# Patient Record
Sex: FEMALE | Race: BLACK OR AFRICAN AMERICAN | NOT HISPANIC OR LATINO | Employment: OTHER | URBAN - METROPOLITAN AREA
[De-identification: names, ages, dates, MRNs, and addresses within clinical notes are randomized per-mention and may not be internally consistent; named-entity substitution may affect disease eponyms.]

---

## 2017-05-11 ENCOUNTER — OFFICE VISIT (OUTPATIENT)
Dept: PEDIATRICS | Facility: CLINIC | Age: 67
End: 2017-05-11
Payer: COMMERCIAL

## 2017-05-11 VITALS
SYSTOLIC BLOOD PRESSURE: 136 MMHG | OXYGEN SATURATION: 98 % | DIASTOLIC BLOOD PRESSURE: 80 MMHG | TEMPERATURE: 98 F | WEIGHT: 165 LBS | HEART RATE: 82 BPM | HEIGHT: 61 IN | BODY MASS INDEX: 31.15 KG/M2

## 2017-05-11 DIAGNOSIS — Z12.31 ENCOUNTER FOR SCREENING MAMMOGRAM FOR BREAST CANCER: ICD-10-CM

## 2017-05-11 DIAGNOSIS — Z12.11 ENCOUNTER FOR SCREENING FOR MALIGNANT NEOPLASM OF COLON: ICD-10-CM

## 2017-05-11 DIAGNOSIS — R10.13 EPIGASTRIC PAIN: Primary | ICD-10-CM

## 2017-05-11 PROCEDURE — 99204 OFFICE O/P NEW MOD 45 MIN: CPT | Mod: GC | Performed by: INTERNAL MEDICINE

## 2017-05-11 NOTE — PATIENT INSTRUCTIONS
1. I ordered a stool test today to check for H.Pylori, a bacteria than can contribute to stomach pain, ulcers, and inflammation.  Please bring this test back in as soon as able.  If possible, she will need additional medications (multiple antibiotics) in addition to the antacid     2. We will try omeprazole one tablet per day, 30 minutes before a meal, to help with stomach pain.      3.  We will try to obtain her old records to see what else she needs     4. I ordered a mammogram (to screen to breast cancer) and colonoscopy (to screen for colon cancer).  These should be done in the next couple months if able.  They will call you to set these up.     5. Follow up in 4 weeks to see how the medication is working and to make sure we update any immunizations, lab tests, etc, after obtaining the old records.     6. She should see a dentist (any one is ok)    7. She should get a routine eye check at an optometrist (we have one downstairs, but any one is ok)

## 2017-05-11 NOTE — PROGRESS NOTES
"  SUBJECTIVE:                                                    Marck Hernandez is a 67 year old female who presents to clinic today for the following health issues:      New Patient/Transfer of Care    CC: Epigastric abdominal pain, establish care    Epigastric abdominal pain - Has h/o GERD in [place] which has been chronic off and on but has been worse since arriving in [place] 2 [place] ago.  Complains of epigastric abdominal pain, feels like her prior GERD but worse.  Worse with eating and specifically worse with spicy food. ALso does drink Georgian coffee.  Much worse at night, when lying down.  No nausea or vomiting. Intermittent constipation, which is chronic but also worse since arriving in the [place].  No diarrhea. No blood in stools.      Establish care - Working on obtaining records from her one prior doctors visit at [facility] which was 1-2 years ago, after she arrived in .      - Past medical history updated in chart, see below  --- HTN - previously on a single medication in [facility] but has been more normal in recent years, no meds currently, has always been normal whenever checked in [place] for past 1-2 years  --- GERD - chronic, see above  --- UTI - no recurrence, one time dx upon arrival in , tx'd with abx     - Had positive \"stool test\" of some sort when seen at [facility]; treated with one drug; son thinks it was positive for ova and parasite    - Family history updated in chart, see below    - No medications currently     - Had a pap smear 1-2 years ago at [facility]; son says he believes it was normal, patient cannot remember, but never had any specific treatment. No known prior abnormal pap smears.   - Has never had a mammogram, colonoscopy, dexa scan  - Unsure of vaccination history. Received some shots prior to arrival in , not sure which ones. Son will help with vaccination records       Problem list and histories reviewed & adjusted, as indicated.  Additional history: as " "documented    There is no problem list on file for this patient.    Past Medical History:   Diagnosis Date     GERD (gastroesophageal reflux disease)      HTN (hypertension)     Previously treated in [place], not on medication since approx 2014    Past Surgical History:   Procedure Laterality Date     NO HISTORY OF SURGERY         Social History   Substance Use Topics     Smoking status: Never Smoker     Smokeless tobacco: Never Used     Alcohol use No     Family History   Problem Relation Age of Onset     DIABETES No family hx of      HEART DISEASE No family hx of      Thyroid Disease No family hx of            Reviewed and updated as needed this visit by clinical staff  Tobacco  Allergies  Meds  Soc Hx      Reviewed and updated as needed this visit by Provider         ROS:  10 point ROS obtained.  Pertinents discussed in HPI, otherwise negative.     OBJECTIVE:                                                    /80 (Cuff Size: Adult Regular)  Pulse 82  Temp 98  F (36.7  C) (Oral)  Ht 5' 1\" (1.549 m)  Wt 165 lb (74.8 kg)  SpO2 98%  BMI 31.18 kg/m2  Body mass index is 31.18 kg/(m^2).  GENERAL: healthy, alert and no distress  EYES: Blue ring around iris bilaterally (arcus senilis?), otherwise normal. PERRL.   HENT: ear canals and TM's normal, nose and mouth without ulcers or lesions. Poor dentition, multiple gold fillings or caps.   NECK: no adenopathy, no asymmetry, masses, or scars and thyroid normal to palpation  RESP: lungs clear to auscultation - no rales, rhonchi or wheezes  CV: regular rate and rhythm, normal S1 S2, no S3 or S4, no murmur, click or rub, no peripheral edema and peripheral pulses strong  ABDOMEN: soft, obese, nontender, no hepatosplenomegaly, no masses and bowel sounds normal  MS: no gross musculoskeletal defects noted, no edema  SKIN: 3-4 cm circular area of depigmented skin over dorsi aspect of right foot (vitiligo?), no other suspicious lesions or moles   NEURO: Normal strength " and tone, mentation intact and speech normal    Diagnostic Test Results:  none      ASSESSMENT/PLAN:                                                        (R10.13) Epigastric pain  (primary encounter diagnosis)  Comment: Likely GERD vs gastritis.  Unlikely PUD but is possible.  Higher risk for h.pylori. Unlikely pancreatitis but could rule out should pain persist despite PPI.  Will test for h pylori and treat empirically with PPI; if not improving may need EGD, but not indicated at this time.   Plan: H Pylori antigen, stool, omeprazole (PRILOSEC)         20 MG CR capsule    (Z12.11) Encounter for screening for malignant neoplasm of colon  Comment: No prior colonoscopies.  No known famiily history of colon cancer or other high risk factors.    Plan: GASTROENTEROLOGY ADULT REF PROCEDURE ONLY    (Z12.31) Encounter for screening mammogram for breast cancer  Comment: No prior mammograms.  No family history as far as her or her son know.  Plan: *MA Screening Digital Bilateral    Pre-hypertension  Comment: Patient with pre hypertensive blood pressure readings and h/o prior HTN, not currenlty on medication.  Discussed monitoring this closely, discussed lifestyle changes to prevent further progression.     Patient here to establish care today and to begin the process of getting caught up on routine health maintenance.  Will obtain records from [facility] (and son can bring in with next visit as well) to see what lab work she may need done.  Reportedly she is up to date on pap smears, it was normal, and it would be reasonable to discuss discontinuation of these, however would want confirmation of a negative pap prior to this discussion.  Likely needs lab workup  including A1c and lipid panel at next visit.  Has arcus senilis on exam which can be indicative of high lipids but can also occur with aging.  Per patient and son preference, will get outside records first so as not to have to repeat labs on multiple visits if  not necessary.      Recommended dentist visit, which son will set up.  Patient does have dental insurance.     Recommended routine eye exam, son knows to schedule this.     Discussed healthy diet and exercise, especially as she has gained weight since [place] in [place].      Follow up in 4 weeks to see how abdominal pain is doing with PPI and to further update any other health maintenance, including labs and vaccines.     D/w Dr. Zhu, attending    Dana Guzman MD  Med/Peds PGY-4  [facility]    ===========  STAFF NOTE:  Patient seen with resident physician today.  I was physically present during key portions of the visit and participated in the evaluation and management of the patient today.     Duke Zhu MD

## 2017-05-11 NOTE — MR AVS SNAPSHOT
After Visit Summary   5/11/2017    Marck Hernandez    MRN: [id number]           Patient Information     Date Of Birth          1950        Visit Information        Provider Department      5/11/2017 10:00 AM Aileen, Assignments; Dana Guzman MD [facility]        Today's Diagnoses     Epigastric pain    -  1    Encounter for screening for malignant neoplasm of colon        Encounter for screening mammogram for breast cancer          Care Instructions    1. I ordered a stool test today to check for H.Pylori, a bacteria than can contribute to stomach pain, ulcers, and inflammation.  Please bring this test back in as soon as able.  If possible, she will need additional medications (multiple antibiotics) in addition to the antacid     2. We will try omeprazole one tablet per day, 30 minutes before a meal, to help with stomach pain.      3.  We will try to obtain her old records to see what else she needs     4. I ordered a mammogram (to screen to breast cancer) and colonoscopy (to screen for colon cancer).  These should be done in the next couple months if able.  They will call you to set these up.     5. Follow up in 4 weeks to see how the medication is working and to make sure we update any immunizations, lab tests, etc, after obtaining the old records.     6. She should see a dentist (any one is ok)    7. She should get a routine eye check at an optometrist (we have one downstairs, but any one is ok)        Follow-ups after your visit        Additional Services     GASTROENTEROLOGY ADULT REF PROCEDURE ONLY       Last Lab Result: No results found for: CR  Body mass index is 31.18 kg/(m^2).     Needed:  Yes  Language:  Oromo    Patient will be contacted to schedule procedure.     Please be aware that coverage of these services is subject to the terms and limitations of your health insurance plan.  Call member services at your health plan with any benefit or coverage questions.  Any  "procedures must be performed at a [facility] facility OR coordinated by your clinic's referral office.    Please bring the following with you to your appointment:    (1) Any X-Rays, CTs or MRIs which have been performed.  Contact the facility where they were done to arrange for  prior to your scheduled appointment.    (2) List of current medications   (3) This referral request   (4) Any documents/labs given to you for this referral                  Future tests that were ordered for you today     Open Future Orders        Priority Expected Expires Ordered    *MA Screening Digital Bilateral Routine  5/11/2018 5/11/2017    H Pylori antigen, stool Routine  6/10/2017 5/11/2017            Who to contact     If you have questions or need follow up information about today's clinic visit or your schedule please contact [facility] directly at [phone].  Normal or non-critical lab and imaging results will be communicated to you by MyChart, letter or phone within 4 business days after the clinic has received the results. If you do not hear from us within 7 days, please contact the clinic through [facility] or phone. If you have a critical or abnormal lab result, we will notify you by phone as soon as possible.  Submit refill requests through [facility] or call your pharmacy and they will forward the refill request to us. Please allow 3 business days for your refill to be completed.          Additional Information About Your Visit        [facility] Information     [facility] lets you send messages to your doctor, view your test results, renew your prescriptions, schedule appointments and more. To sign up, go to [email or web address] . Click on \"Log in\" on the left side of the screen, which will take you to the Welcome page. Then click on \"Sign up Now\" on the right side of the page.     You will be asked to enter the access code listed below, as well as some personal information. Please follow the directions to " "create your username and password.     Your access code is: [email or web address]  Expires: 2017 11:19 AM     Your access code will  in 90 days. If you need help or a new code, please call your [facility] clinic or [phone].        Care EveryWhere ID     This is your Care EveryWhere ID. This could be used by other organizations to access your [facility] medical records  [id number]        Your Vitals Were     Pulse Temperature Height Pulse Oximetry BMI (Body Mass Index)       82 98  F (36.7  C) (Oral) 5' 1\" (1.549 m) 98% 31.18 kg/m2        Blood Pressure from Last 3 Encounters:   17 136/80    Weight from Last 3 Encounters:   17 165 lb (74.8 kg)              We Performed the Following     GASTROENTEROLOGY ADULT REF PROCEDURE ONLY          Today's Medication Changes          These changes are accurate as of: 17 11:23 AM.  If you have any questions, ask your nurse or doctor.               Start taking these medicines.        Dose/Directions    omeprazole 20 MG CR capsule   Commonly known as:  priLOSEC   Used for:  Epigastric pain   Started by:  Dana Guzman MD        Dose:  20 mg   Take 1 capsule (20 mg) by mouth daily   Quantity:  30 capsule   Refills:  1            Where to get your medicines      These medications were sent to [facility] - [address]  [address]  [address], [place] [address]     Phone:  [phone]     omeprazole 20 MG CR capsule                Primary Care Provider    None Specified       No primary provider on file.        Thank you!     Thank you for choosing [facility]  for your care. Our goal is always to provide you with excellent care. Hearing back from our patients is one way we can continue to improve our services. Please take a few minutes to complete the written survey that you may receive in the mail after your visit with us. Thank you!             Your Updated Medication List - Protect others " around you: Learn how to safely use, store and throw away your medicines at www.disposemymeds.org.          This list is accurate as of: 5/11/17 11:23 AM.  Always use your most recent med list.                   Brand Name Dispense Instructions for use    omeprazole 20 MG CR capsule    priLOSEC    30 capsule    Take 1 capsule (20 mg) by mouth daily

## 2017-05-12 DIAGNOSIS — R10.13 EPIGASTRIC PAIN: ICD-10-CM

## 2017-05-12 PROCEDURE — 87338 HPYLORI STOOL AG IA: CPT | Performed by: INTERNAL MEDICINE

## 2017-05-16 ENCOUNTER — RADIANT APPOINTMENT (OUTPATIENT)
Dept: MAMMOGRAPHY | Facility: CLINIC | Age: 67
End: 2017-05-16
Attending: INTERNAL MEDICINE
Payer: COMMERCIAL

## 2017-05-16 DIAGNOSIS — Z12.31 ENCOUNTER FOR SCREENING MAMMOGRAM FOR BREAST CANCER: ICD-10-CM

## 2017-05-16 LAB
H PYLORI AG STL QL IA: ABNORMAL
MICRO REPORT STATUS: ABNORMAL
SPECIMEN SOURCE: ABNORMAL

## 2017-05-16 PROCEDURE — G0202 SCR MAMMO BI INCL CAD: HCPCS | Mod: TC

## 2017-05-17 ENCOUNTER — TELEPHONE (OUTPATIENT)
Dept: PEDIATRICS | Facility: CLINIC | Age: 67
End: 2017-05-17

## 2017-05-17 ENCOUNTER — HOSPITAL ENCOUNTER (EMERGENCY)
Facility: CLINIC | Age: 67
Discharge: HOME | End: 2017-05-17
Attending: EMERGENCY MEDICINE | Admitting: EMERGENCY MEDICINE
Payer: COMMERCIAL

## 2017-05-17 VITALS
SYSTOLIC BLOOD PRESSURE: 132 MMHG | OXYGEN SATURATION: 98 % | RESPIRATION RATE: 20 BRPM | TEMPERATURE: 97.3 F | DIASTOLIC BLOOD PRESSURE: 93 MMHG | HEART RATE: 76 BPM

## 2017-05-17 DIAGNOSIS — R03.0 ELEVATED BLOOD PRESSURE READING WITHOUT DIAGNOSIS OF HYPERTENSION: ICD-10-CM

## 2017-05-17 LAB
ANION GAP SERPL CALCULATED.3IONS-SCNC: 6 MMOL/L (ref 3–14)
BUN SERPL-MCNC: 10 MG/DL (ref 7–30)
CALCIUM SERPL-MCNC: 8.8 MG/DL (ref 8.5–10.1)
CHLORIDE SERPL-SCNC: 105 MMOL/L (ref 94–109)
CO2 SERPL-SCNC: 28 MMOL/L (ref 20–32)
CREAT SERPL-MCNC: 0.6 MG/DL (ref 0.52–1.04)
ERYTHROCYTE [DISTWIDTH] IN BLOOD BY AUTOMATED COUNT: 13.1 % (ref 10–15)
GFR SERPL CREATININE-BSD FRML MDRD: ABNORMAL ML/MIN/1.7M2
GLUCOSE SERPL-MCNC: 132 MG/DL (ref 70–99)
HCT VFR BLD AUTO: 39.1 % (ref 35–47)
HGB BLD-MCNC: 12.8 G/DL (ref 11.7–15.7)
MCH RBC QN AUTO: 27.8 PG (ref 26.5–33)
MCHC RBC AUTO-ENTMCNC: 32.7 G/DL (ref 31.5–36.5)
MCV RBC AUTO: 85 FL (ref 78–100)
PLATELET # BLD AUTO: 318 10E9/L (ref 150–450)
POTASSIUM SERPL-SCNC: 4 MMOL/L (ref 3.4–5.3)
RBC # BLD AUTO: 4.6 10E12/L (ref 3.8–5.2)
SODIUM SERPL-SCNC: 139 MMOL/L (ref 133–144)
TROPONIN I SERPL-MCNC: NORMAL UG/L (ref 0–0.04)
WBC # BLD AUTO: 7.7 10E9/L (ref 4–11)

## 2017-05-17 PROCEDURE — 36415 COLL VENOUS BLD VENIPUNCTURE: CPT | Performed by: EMERGENCY MEDICINE

## 2017-05-17 PROCEDURE — 99284 EMERGENCY DEPT VISIT MOD MDM: CPT

## 2017-05-17 PROCEDURE — 93005 ELECTROCARDIOGRAM TRACING: CPT

## 2017-05-17 PROCEDURE — 85027 COMPLETE CBC AUTOMATED: CPT | Performed by: EMERGENCY MEDICINE

## 2017-05-17 PROCEDURE — 84484 ASSAY OF TROPONIN QUANT: CPT | Performed by: EMERGENCY MEDICINE

## 2017-05-17 PROCEDURE — 80048 BASIC METABOLIC PNL TOTAL CA: CPT | Performed by: EMERGENCY MEDICINE

## 2017-05-17 NOTE — ED AVS SNAPSHOT
[facility]    [address]    [facility] [address]    Phone:  [phone]    Fax:  [phone]                                       Marck Hernandez   MRN: [id number]    Department:  [facility]   Date of Visit:  5/17/2017           After Visit Summary Signature Page     I have received my discharge instructions, and my questions have been answered. I have discussed any challenges I see with this plan with the nurse or doctor.    ..........................................................................................................................................  Patient/Patient Representative Signature      ..........................................................................................................................................  Patient Representative Print Name and Relationship to Patient    ..................................................               ................................................  Date                                            Time    ..........................................................................................................................................  Reviewed by Signature/Title    ...................................................              ..............................................  Date                                                            Time

## 2017-05-17 NOTE — ED AVS SNAPSHOT
[facility] Emergency Department    [address]    [facility] [address]    Phone:  [phone]    Fax:  [phone]                                       Marck Hernandez   MRN: [id number]    Department:  [facility] Emergency Department   Date of Visit:  5/17/2017           Patient Information     Date Of Birth          1950        Your diagnoses for this visit were:     Elevated blood pressure reading without diagnosis of hypertension        You were seen by John Man MD.      Follow-up Information     Follow up with Duke Zhu MD. Schedule an appointment as soon as possible for a visit in 2 days.    Specialties:  Internal Medicine, Pediatrics    Contact information:    [facility] [address] [place] [address]  [phone]          Follow up with [facility] Emergency Department.    Specialty:  EMERGENCY MEDICINE    Why:  If symptoms worsen    Contact information:    [address]  [facility] [id number] [phone]        Discharge Instructions       Discharge Instructions  Hypertension - High Blood Pressure    During you visit to the Emergency Department, your blood pressure was higher than the recommended blood pressure.  This may be related to stress, pain, medication or other temporary conditions. In these cases, your blood pressure may return to normal on its own. If you have a history of high blood pressure, you may need to have your doctor adjust your medications. Sometimes, your high measurement here may indicate that you have developed high blood pressure that will stay high unless it is treated. Sudden very high blood pressure can cause problems, but usually high blood pressure causes problems over months to years.      Blood pressure is almost never lowered in the Emergency Department, because studies have shown that lowering blood pressure too quickly is much more dangerous than  leaving it alone.    You need to follow up with your doctor in 1-3 days to get your blood pressure rechecked.     Return to the Emergency Department if you start to have:    A severe headache.    Chest pain.    Shortness of breath.    Weakness or numbness that affects one part of the body.    Confusion.    Vision changes.    Significant swelling of legs and/or eyes.    A reaction to any medication started in the Emergency Department.    What can I do to help myself?    Avoid alcohol.    Take any blood pressure medicine that you are prescribed.    Get a good night s sleep.    Lower your salt intake.    Exercise.    Lose weight.    Manage stress.    If blood pressure medication was started in the Emergency Department:    The medicine may not have an immediate effect. The body and brain determine what blood pressure you have. The medicine s job is to retrain the body s  thermostat  to a lower blood pressure.    You will need to follow up with your doctor to see how this medicine is working for you.  If you were given a prescription for medicine here today, be sure to read all of the information (including the package insert) that comes with your prescription.  This will include important information about the medicine, its side effects, and any warnings that you need to know about.  The pharmacist who fills the prescription can provide more information and answer questions you may have about the medicine.  If you have questions or concerns that the pharmacist cannot address, please call or return to the Emergency Department.   Opioid Medication Information    Pain medications are among the most commonly prescribed medicines, so we are including this information for all our patients. If you did not receive pain medication or get a prescription for pain medicine, you can ignore it.     You may have been given a prescription for an opioid (narcotic) pain medicine and/or have received a pain medicine while here in the  Emergency Department. These medicines can make you drowsy or impaired. You must not drive, operate dangerous equipment, or engage in any other dangerous activities while taking these medications. If you drive while taking these medications, you could be arrested for DUI, or driving under the influence. Do not drink any alcohol while you are taking these medications.     Opioid pain medications can cause addiction. If you have a history of chemical dependency of any type, you are at a higher risk of becoming addicted to pain medications.  Only take these prescribed medications to treat your pain when all other options have been tried. Take it for as short a time and as few doses as possible. Store your pain pills in a secure place, as they are frequently stolen and provide a dangerous opportunity for children or visitors in your house to start abusing these powerful medications. We will not replace any lost or stolen medicine.  As soon as your pain is better, you should flush all your remaining medication.     Many prescription pain medications contain Tylenol  (acetaminophen), including Vicodin , Tylenol #3 , Norco , Lortab , and Percocet .  You should not take any extra pills of Tylenol  if you are using these prescription medications or you can get very sick.  Do not ever take more than 3000 mg of acetaminophen in any 24 hour period.    All opioids tend to cause constipation. Drink plenty of water and eat foods that have a lot of fiber, such as fruits, vegetables, prune juice, apple juice and high fiber cereal.  Take a laxative if you don t move your bowels at least every other day. Miralax , Milk of Magnesia, Colace , or Senna  can be used to keep you regular.      Remember that you can always come back to the Emergency Department if you are not able to see your regular doctor in the amount of time listed above, if you get any new symptoms, or if there is anything that worries you.        Future Appointments         Provider Department Dept Phone Center    6/8/2017 10:15 AM Dana Guzman MD [facility] [phone] [facility]      24 Hour Appointment Hotline       To make an appointment at any [facility], call [phone] ([phone]). If you don't have a family doctor or clinic, we will help you find one. [facility] are conveniently located to serve the needs of you and your family.             Review of your medicines      Our records show that you are taking the medicines listed below. If these are incorrect, please call your family doctor or clinic.        Dose / Directions Last dose taken    omeprazole 20 MG CR capsule   Commonly known as:  priLOSEC   Dose:  20 mg   Quantity:  30 capsule        Take 1 capsule (20 mg) by mouth daily   Refills:  1                Procedures and tests performed during your visit     Basic metabolic panel (BMP)    CBC (platelets, no diff)    EKG 12 lead    Troponin I      Orders Needing Specimen Collection     None      Pending Results     Date and Time Order Name Status Description    5/17/2017 1815 EKG 12 lead Preliminary             Pending Culture Results     No orders found from 5/15/2017 to 5/18/2017.            Pending Results Instructions     If you had any lab results that were not finalized at the time of your Discharge, you can call the ED Lab Result RN at [phone]. You will be contacted by this team for any positive Lab results or changes in treatment. The nurses are available 7 days a week from 10A to 6:30P.  You can leave a message 24 hours per day and they will return your call.        Test Results From Your Hospital Stay        5/17/2017  7:04 PM      Component Results     Component Value Ref Range & Units Status    Sodium 139 133 - 144 mmol/L Final    Potassium 4.0 3.4 - 5.3 mmol/L Final    Chloride 105 94 - 109 mmol/L Final    Carbon Dioxide 28 20 - 32 mmol/L Final    Anion Gap 6 3 - 14 mmol/L Final    Glucose 132 (H) 70 - 99 mg/dL Final    Urea  Nitrogen 10 7 - 30 mg/dL Final    Creatinine 0.60 0.52 - 1.04 mg/dL Final    GFR Estimate >90  Non  GFR Calc   >60 mL/min/1.7m2 Final    GFR Estimate If Black >90   GFR Calc   >60 mL/min/1.7m2 Final    Calcium 8.8 8.5 - 10.1 mg/dL Final         5/17/2017  6:41 PM      Component Results     Component Value Ref Range & Units Status    WBC 7.7 4.0 - 11.0 10e9/L Final    RBC Count 4.60 3.8 - 5.2 10e12/L Final    Hemoglobin 12.8 11.7 - 15.7 g/dL Final    Hematocrit 39.1 35.0 - 47.0 % Final    MCV 85 78 - 100 fl Final    MCH 27.8 26.5 - 33.0 pg Final    MCHC 32.7 31.5 - 36.5 g/dL Final    RDW 13.1 10.0 - 15.0 % Final    Platelet Count 318 150 - 450 10e9/L Final         5/17/2017  7:04 PM      Component Results     Component Value Ref Range & Units Status    Troponin I ES  0.000 - 0.045 ug/L Final    <0.015  The 99th percentile for upper reference range is 0.045 ug/L.  Troponin values in   the range of 0.045 - 0.120 ug/L may be associated with risks of adverse   clinical events.                  Clinical Quality Measure: Blood Pressure Screening     Your blood pressure was checked while you were in the emergency department today. The last reading we obtained was  BP: (!) 132/93 . Please read the guidelines below about what these numbers mean and what you should do about them.  If your systolic blood pressure (the top number) is less than 120 and your diastolic blood pressure (the bottom number) is less than 80, then your blood pressure is normal. There is nothing more that you need to do about it.  If your systolic blood pressure (the top number) is 120-139 or your diastolic blood pressure (the bottom number) is 80-89, your blood pressure may be higher than it should be. You should have your blood pressure rechecked within a year by a primary care provider.  If your systolic blood pressure (the top number) is 140 or greater or your diastolic blood pressure (the bottom number) is 90 or  "greater, you may have high blood pressure. High blood pressure is treatable, but if left untreated over time it can put you at risk for heart attack, stroke, or kidney failure. You should have your blood pressure rechecked by a primary care provider within the next 4 weeks.  If your provider in the emergency department today gave you specific instructions to follow-up with your doctor or provider even sooner than that, you should follow that instruction and not wait for up to 4 weeks for your follow-up visit.        Thank you for choosing [facility]       Thank you for choosing [facility] for your care. Our goal is always to provide you with excellent care. Hearing back from our patients is one way we can continue to improve our services. Please take a few minutes to complete the written survey that you may receive in the mail after you visit with us. Thank you!        [email or web address] Information     [employer] lets you send messages to your doctor, view your test results, renew your prescriptions, schedule appointments and more. To sign up, go to [email or web address] . Click on \"Log in\" on the left side of the screen, which will take you to the Welcome page. Then click on \"Sign up Now\" on the right side of the page.     You will be asked to enter the access code listed below, as well as some personal information. Please follow the directions to create your username and password.     Your access code is: [email or web address]  Expires: 2017 11:19 AM     Your access code will  in 90 days. If you need help or a new code, please call your [facility] clinic or [phone].        Care EveryWhere ID     This is your Care EveryWhere ID. This could be used by other organizations to access your [facility] medical records  [id number]        After Visit Summary       This is your record. Keep this with you and show to your community pharmacist(s) and doctor(s) at your next visit.                  "

## 2017-05-17 NOTE — DISCHARGE INSTRUCTIONS
Discharge Instructions  Hypertension - High Blood Pressure    During you visit to the Emergency Department, your blood pressure was higher than the recommended blood pressure.  This may be related to stress, pain, medication or other temporary conditions. In these cases, your blood pressure may return to normal on its own. If you have a history of high blood pressure, you may need to have your doctor adjust your medications. Sometimes, your high measurement here may indicate that you have developed high blood pressure that will stay high unless it is treated. Sudden very high blood pressure can cause problems, but usually high blood pressure causes problems over months to years.      Blood pressure is almost never lowered in the Emergency Department, because studies have shown that lowering blood pressure too quickly is much more dangerous than leaving it alone.    You need to follow up with your doctor in 1-3 days to get your blood pressure rechecked.     Return to the Emergency Department if you start to have:    A severe headache.    Chest pain.    Shortness of breath.    Weakness or numbness that affects one part of the body.    Confusion.    Vision changes.    Significant swelling of legs and/or eyes.    A reaction to any medication started in the Emergency Department.    What can I do to help myself?    Avoid alcohol.    Take any blood pressure medicine that you are prescribed.    Get a good night s sleep.    Lower your salt intake.    Exercise.    Lose weight.    Manage stress.    If blood pressure medication was started in the Emergency Department:    The medicine may not have an immediate effect. The body and brain determine what blood pressure you have. The medicine s job is to retrain the body s  thermostat  to a lower blood pressure.    You will need to follow up with your doctor to see how this medicine is working for you.  If you were given a prescription for medicine here today, be sure to read all of  the information (including the package insert) that comes with your prescription.  This will include important information about the medicine, its side effects, and any warnings that you need to know about.  The pharmacist who fills the prescription can provide more information and answer questions you may have about the medicine.  If you have questions or concerns that the pharmacist cannot address, please call or return to the Emergency Department.   Opioid Medication Information    Pain medications are among the most commonly prescribed medicines, so we are including this information for all our patients. If you did not receive pain medication or get a prescription for pain medicine, you can ignore it.     You may have been given a prescription for an opioid (narcotic) pain medicine and/or have received a pain medicine while here in the Emergency Department. These medicines can make you drowsy or impaired. You must not drive, operate dangerous equipment, or engage in any other dangerous activities while taking these medications. If you drive while taking these medications, you could be arrested for DUI, or driving under the influence. Do not drink any alcohol while you are taking these medications.     Opioid pain medications can cause addiction. If you have a history of chemical dependency of any type, you are at a higher risk of becoming addicted to pain medications.  Only take these prescribed medications to treat your pain when all other options have been tried. Take it for as short a time and as few doses as possible. Store your pain pills in a secure place, as they are frequently stolen and provide a dangerous opportunity for children or visitors in your house to start abusing these powerful medications. We will not replace any lost or stolen medicine.  As soon as your pain is better, you should flush all your remaining medication.     Many prescription pain medications contain Tylenol  (acetaminophen),  including Vicodin , Tylenol #3 , Norco , Lortab , and Percocet .  You should not take any extra pills of Tylenol  if you are using these prescription medications or you can get very sick.  Do not ever take more than 3000 mg of acetaminophen in any 24 hour period.    All opioids tend to cause constipation. Drink plenty of water and eat foods that have a lot of fiber, such as fruits, vegetables, prune juice, apple juice and high fiber cereal.  Take a laxative if you don t move your bowels at least every other day. Miralax , Milk of Magnesia, Colace , or Senna  can be used to keep you regular.      Remember that you can always come back to the Emergency Department if you are not able to see your regular doctor in the amount of time listed above, if you get any new symptoms, or if there is anything that worries you.

## 2017-05-17 NOTE — ED NOTES
Pt was seen at dental clinic today to have a tooth pulled. Pt could not have procedure done due to high blood pressure. Pt has history of HTN but quit taking her medication because she got better.

## 2017-05-17 NOTE — ED PROVIDER NOTES
History      Chief Complaint:  Hypertension     HPI:   Marck Hernandez is a 67 year old female who presents to the ER for evaluation of hypertension.  Pt was at the dentist earlier today to have a tooth removed, where she was noted to have an elevated BP of >200/80.  Dental procedure was aborted and patient recommended to seek evaluation.  Here in the ED, patient denies presence of a headache.  She notes epigastric abdominal pain, which has been present for 1-2 yrs, and she was recently diagnosed with H pylori.  Son states she has been started on medications.  Pt denies chest pain, shortness of breath, or other neurologic symptoms.  Hx of HTN and had been on medications in [facility], though son does not recall the name of these.  She has been off for >1 yr.  No other concerns are voiced at present.     Allergies:  No Known Allergies     Medications:    Current Outpatient Prescriptions   Medication     omeprazole (PRILOSEC) 20 MG CR capsule        Past Medical History:    Past Medical History:   Diagnosis Date     GERD (gastroesophageal reflux disease)      HTN (hypertension)     Previously treated in [facility], not on medication since approx 2014        Past Surgical History:   Past Surgical History:   Procedure Laterality Date     NO HISTORY OF SURGERY          Family History:   Family History   Problem Relation Age of Onset     DIABETES No family hx of      HEART DISEASE No family hx of      Thyroid Disease No family hx of         Social History:  Social History     Social History     Marital status:      Spouse name: N/A     Number of children: N/A     Years of education: N/A     Occupational History     Not on file.     Social History Main Topics     Smoking status: Never Smoker     Smokeless tobacco: Never Used     Alcohol use No     Drug use: No     Sexual activity: No     Other Topics Concern     Not on file     Social History Narrative    Originally from [facility], moved here with son in 2015.  Lives with  son.      Exercises at the gym. Active.     Adjust[place] well to the [place].         Review of Systems:   10 point ROS negative aside from that mentioned in HPI     Physical Exam   BP (!) 160/99  Pulse 76  Temp 97.3  F (36.3  C) (Temporal)  Resp 20  SpO2 99%     Physical Exam  General:   Well-nourished   Speaking in full sentences  Eyes:   Conjunctiva without injection or scleral icterus   PERRL  ENT:   Moist mucous membranes   Posterior oropharynx clear without erythema or exudate   Nares patent   Pinnae normal  Neck:   Full ROM   No stiffness appreciated  Resp:   Lungs CTAB   No crackles, wheezing or audible rubs   Good air movement  CV:    Normal rate, regular rhythm   S1 and S2 present   No murmur, gallop or rub  GI:   BS present   Abdomen soft without distention   Non-tender to light and deep palpation   No guarding or rebound tenderness  Skin:   Warm, dry, well perfused   No rashes or open wounds on exposed skin  MSK:   Moves all extremities   No focal deformities or swelling  Neuro:   Alert   Answers questions appropriately   Moves all extremities equally   Gait stable  Psych:   Normal affect, normal mood     Emergency Department Course   ECG:  ECG taken at 1831, ECG read at 1832  Normal sinus rhythm  Normal ECG  Rate 71 bpm. WV interval 168. QRS duration 78. QT/QTc 406/441. P-R-T axes 44 -5 29.       Laboratory:  Laboratory findings were communicated with the patient who voiced understanding of the findings.  CBC: WNL  BMP: Glu 132, otherwise WNL  Trop: <0.015    Emergency Department Course:  Nursing notes and vitals reviewed.  6:08 PM: I performed an exam of the patient as documented above.      IV was inserted and blood was drawn for laboratory testing, results above.    9:32:  Patient re-evaluated. I discussed the treatment plan with the patient and son. They expressed understanding of this plan and consented to discharge. They will be discharged home with instructions for care and follow up. In addition,  the patient will return to the emergency department if their symptoms persist, worsen, if new symptoms arise or if there is any concern.  All questions were answered.    I personally reviewed the imaging and laboratory results with the Patient and answered all related questions prior to discharge.     Impression & Plan       Medical Decision Making:  Marck Hernandez is a 67 year old female who presents to the ER for evaluation of hypertension.  Vital signs on presentation reveal BP of 160/99.  Hx, exam, and ED course as above.  Pt here in the ED has evidence of elevated blood pressure.  Work-up performed to evaluate for evidence of end-organ damage, though none is currently detected.  Pt is without headache, has no evidence of encephalopathy, and neurologic exam is non-focal.  Pt notes no chest pain, shortness of breath, EKG is without acute ischemia, and troponin is undetectable.  Labs reveal BS of 132, though otherwise normal BMP, normal CBC, Trop.  Pt does have epigastric discomfort which has been present for 1-2 yrs, and she was recently diagnosed with H pylori, for which she has been started on appropriate treatment.  Symptoms not felt consistent with ACS nor PE.  BP here in the ED is elevated, though she will require ongoing monitoring and discussion with PCP prior to initiation of anti-hypertensive therapy.  BP improved to 130s systolic without intervention.  Will defer to PCP and recommend F/U later this week.  She is to keep a log of BP and bring this with her to her outpatient follow-up apt.  Clinical impression discussed with pt and son at bedside. They were comfortable with proposed plan of care and questions were answered prior to DC.     Diagnosis:  Visit Diagnosis, Associated Orders, and Comments     ICD-10-CM    1. Elevated blood pressure reading without diagnosis of hypertension R03.0 Basic metabolic panel (BMP)     CBC (platelets, no diff)     Troponin I              Disposition:   Home with PCP  follow-up.       John Man MD  05/18/17 0106

## 2017-05-18 LAB — INTERPRETATION ECG - MUSE: NORMAL

## 2017-06-08 ENCOUNTER — APPOINTMENT (OUTPATIENT)
Dept: SURGERY | Facility: PHYSICIAN GROUP | Age: 67
End: 2017-06-08
Payer: COMMERCIAL

## 2017-06-08 ENCOUNTER — HOSPITAL ENCOUNTER (OUTPATIENT)
Facility: CLINIC | Age: 67
Discharge: HOME | End: 2017-06-08
Attending: SURGERY | Admitting: SURGERY
Payer: COMMERCIAL

## 2017-06-08 VITALS
DIASTOLIC BLOOD PRESSURE: 90 MMHG | OXYGEN SATURATION: 99 % | SYSTOLIC BLOOD PRESSURE: 123 MMHG | RESPIRATION RATE: 14 BRPM

## 2017-06-08 LAB — COLONOSCOPY: NORMAL

## 2017-06-08 PROCEDURE — G0121 COLON CA SCRN NOT HI RSK IND: HCPCS | Performed by: SURGERY

## 2017-06-08 PROCEDURE — G0500 MOD SEDAT ENDO SERVICE >5YRS: HCPCS | Performed by: SURGERY

## 2017-06-08 PROCEDURE — 25000128 H RX IP 250 OP 636: Performed by: SURGERY

## 2017-06-08 PROCEDURE — 25000125 ZZHC RX 250: Performed by: SURGERY

## 2017-06-08 PROCEDURE — 45378 DIAGNOSTIC COLONOSCOPY: CPT | Performed by: SURGERY

## 2017-06-08 PROCEDURE — 99153 MOD SED SAME PHYS/QHP EA: CPT | Performed by: SURGERY

## 2017-06-08 RX ORDER — LIDOCAINE 40 MG/G
CREAM TOPICAL
Status: DISCONTINUED | OUTPATIENT
Start: 2017-06-08 | End: 2017-06-08 | Stop reason: HOSPADM

## 2017-06-08 RX ORDER — ONDANSETRON 2 MG/ML
4 INJECTION INTRAMUSCULAR; INTRAVENOUS
Status: DISCONTINUED | OUTPATIENT
Start: 2017-06-08 | End: 2017-06-08 | Stop reason: HOSPADM

## 2017-06-08 RX ORDER — ONDANSETRON 2 MG/ML
4 INJECTION INTRAMUSCULAR; INTRAVENOUS EVERY 6 HOURS PRN
Status: DISCONTINUED | OUTPATIENT
Start: 2017-06-08 | End: 2017-06-08 | Stop reason: HOSPADM

## 2017-06-08 RX ORDER — NALOXONE HYDROCHLORIDE 0.4 MG/ML
.1-.4 INJECTION, SOLUTION INTRAMUSCULAR; INTRAVENOUS; SUBCUTANEOUS
Status: DISCONTINUED | OUTPATIENT
Start: 2017-06-08 | End: 2017-06-08 | Stop reason: HOSPADM

## 2017-06-08 RX ORDER — FLUMAZENIL 0.1 MG/ML
0.2 INJECTION, SOLUTION INTRAVENOUS
Status: DISCONTINUED | OUTPATIENT
Start: 2017-06-08 | End: 2017-06-08 | Stop reason: HOSPADM

## 2017-06-08 RX ORDER — ONDANSETRON 4 MG/1
4 TABLET, ORALLY DISINTEGRATING ORAL EVERY 6 HOURS PRN
Status: DISCONTINUED | OUTPATIENT
Start: 2017-06-08 | End: 2017-06-08 | Stop reason: HOSPADM

## 2017-06-08 RX ORDER — FENTANYL CITRATE 50 UG/ML
INJECTION, SOLUTION INTRAMUSCULAR; INTRAVENOUS PRN
Status: DISCONTINUED | OUTPATIENT
Start: 2017-06-08 | End: 2017-06-08 | Stop reason: HOSPADM

## 2017-06-08 NOTE — LETTER
May 30, 2017      Marck Hernandez  [address]   [place] [address]              Dear Marck Hernandez,    Thank you for choosing [facility]. You are scheduled for the following service.     Date:  June 8th, 2017 - Thursday             Procedure:  COLONOSCOPY  Doctor:        Alpesh Clark   Arrival Time:  8:30 am  *check in at Emergency/Endoscopy desk*  Procedure Time:  9:00 am    Location:   [facility]        Endoscopy Department, First Floor (Enter through ER Doors) *        [address] [place], [place] [address]      [phone] or [phone] () to reschedule        Colonoscopy is the most accurate test to detect colon polyps and colon cancer; and the only test where polyps can be removed. During this procedure, a doctor examines the lining of your large intestine and rectum through a flexible tube.           Transportation  Arrange for a ride for the day of your procedure with a responsible adult.  A taxi ride is not an option unless you are accompanied by a responsible adult. If you fail to arrange transportation with a responsible adult, your procedure will be cancelled and rescheduled.    MIRALAX -GATORADE  PREP    Purchase the following supplies at your local pharmacy:  - 2 (two) bisacodyl tablets: each tablet contains 5 mg.  (Dulcolax  laxative NOT Dulcolax  stool softener)   - 1 (one) 8.3 oz bottle of Polyethylene Glycol (PEG) 3350 Powder   (MiraLAX , Smooth LAX , ClearLAX  or equivalent)  - 64 oz Gatorade    Regular Gatorade, Gatorade G2 , Powerade , Powerade Zero  or Pedialyte  is acceptable. Red colored flavors are not allowed; all other colors (yellow, green, orange, purple and blue) are okay. It is also okay to buy two 2.12 oz packets of powdered Gatorade that can be mixed with water to a total volume of 64 oz of liquid.  - 1 (one) 10 oz bottle of Magnesium Citrate (Red colored flavors are not allowed)  It is also okay for  you to use a 0.5 oz package of powdered magnesium citrate (17 g) mixed with 10 oz of water.      PREPARATION FOR COLONOSCOPY    7 days before:    Discontinue fiber supplements and medications containing iron. This includes Metamucil  and Fibercon ; and multivitamins with iron.  3 days before:    Begin a low-fiber diet. A low-fiber diet helps making the cleanout more effective.     Examples of a low-fiber diet include (but are not limited to): white bread, white rice, pasta, crackers, fish, chicken, eggs, ground beef, creamy peanut butter, cooked/steamed/boiled vegetables, canned fruit, bananas, melons, milk, plain yogurt cheese, salad dressing and other condiments.     The following are not allowed on a low-fiber diet: seeds, nuts, popcorn, bran, whole wheat, corn, quinoa, raw fruits and vegetables, berries and dried fruit, beans and lentils.    For additional details on low-fiber diet, please refer to the table on the last page.  2 days before:    Continue the low-fiber diet.     Drink at least 8 glasses of water throughout the day.     Stop eating solid foods at 11:45 pm.  1 day before:    In the morning: begin a clear liquid diet (liquids you can see through).     Examples of a clear liquid diet include: water, clear broth or bouillon, Gatorade, Pedialyte or Powerade, carbonated and non-carbonated soft drinks (Sprite , 7-Up , ginger ale), strained fruit juices without pulp (apple, white grape, white cranberry), Jell-O  and popsicles.     The following are not allowed on a clear liquid diet: red liquids, alcoholic beverages, coffee, dairy products (milk, creamer, and yogurt), protein shakes, creamy broths, juice with pulp and chewing tobacco.    At noon: take 2 (two) bisacodyl tablets     At 4 (and no later than 6pm): start drinking the Miralax-Gatorade preparation (8.3 oz of Miralax mixed with 64 oz of Gatorade in a large pitcher). Drink 1(one) 8 oz glass every 15 minutes thereafter, until the mixture is  gone.    COLON CLEANSING TIPS: drink adequate amounts of fluids before and after your colon cleansing to prevent dehydration. Stay near a toilet because you will have diarrhea. Even if you are sitting on the toilet, continue to drink the cleansing solution every 15 minutes. If you feel nauseous or vomit, rinse your mouth with water, take a 15 to 30-minute-break and then continue drinking the solution. You will be uncomfortable until the stool has flushed from your colon (in about 2 to 4 hours). You may feel chilled.    Day of your procedure  You may take all of your morning medications including blood pressure medications, blood thinners (if you have not been instructed to stop these by our office), methadone, anti-seizure medications with sips of water 3 hours prior to your procedure or earlier. Do not take insulin or vitamins prior to your procedure. Continue the clear liquid diet.   4 hours prior: drink 10 oz of magnesium citrate. It may be easier to drink it with a straw.    STOP consuming all liquids after that.     Do not take anything by mouth during this time.     Allow extra time to travel to your procedure as you may need to stop and use a restroom along the way.  You are ready for the procedure, if you followed all instructions and your stool is no longer formed, but clear or yellow liquid. If you are unsure whether your colon is clean, please call our office at [phone] before you leave for your appointment.  Bring the following to your procedure:  - Insurance Card/Photo ID.   - List of current medications including over-the-counter medications and supplements.   - Your rescue inhaler if you currently use one to control asthma.      Canceling or rescheduling your appointment:   If you must cancel or reschedule your appointment, please call [phone] as soon as possible.      COLONOSCOPY PRE-PROCEDURE CHECKLIST  If you have diabetes, ask your regular doctor for diet and medication restrictions.  If  you take an anticoagulant or anti-platelet medication (such as Coumadin , Lovenox , Pradaxa , Xarelto , Eliquis , etc.), please call your primary doctor for advice on holding this medication.  If you take aspirin you may continue to do so.  If you are or may be pregnant, please discuss the risks and benefits of this procedure with your doctor.          What happens during a colonoscopy?    Plan to spend up to two hours, starting at registration time, at the endoscopy center the day of your procedure. The colonoscopy takes an average of 15 to 30 minutes. Recovery time is about 30 minutes.    Before the exam:    You will change into a gown.    Your medical history and medication list will be reviewed with you, unless that has been done over the phone prior to the procedure.     A nurse will insert an intravenous (IV) line into your hand or arm.    The doctor will meet with you and will give you a consent form to sign.    During the exam:     Medicine will be given through the IV line to help you relax.     Your heart rate and oxygen levels will be monitored. If your blood pressure is low, you may be given fluids through the IV line.     The doctor will insert a flexible hollow tube, called a colonoscope, into your rectum. The scope will be advanced slowly through the large intestine (colon).    You may have a feeling of fullness or pressure.     If an abnormal tissue or a polyp is found, the doctor may remove it through the endoscope for closer examination, or biopsy. Tissue removal is painless    After the exam:           Any tissue samples removed during the exam will be sent to a lab for evaluation. It may take 5-7 working days for you to be notified of the results.     A nurse will provide you with complete discharge instructions before you leave the endoscopy center. Be sure to ask the nurse for specific instructions if you take blood thinners such as Aspirin, Coumadin or Plavix.     The doctor will prepare a  full report for you and for the physician who referred you for the procedure.     Your doctor will talk with you about the initial results of your exam.      Medication given during the exam will prohibit you from driving for the rest of the day.     Following the exam, you may resume your normal diet. Your first meal should be light, no greasy foods. Avoid alcohol until the next day.     You may resume your regular activities the day after the procedure.     LOW-FIBER DIET    Foods RECOMMENDED Foods to AVOID   Breads, Cereal, Rice and Pasta:   White bread, rolls, biscuits, croissant and charissa toast.   Waffles, Tajik toast and pancakes.   White rice, noodles, pasta, macaroni and peeled cooked potatoes.   Plain crackers and saltines.   Cooked cereals: farina, cream of rice.   Cold cereals: Puffed Rice , Rice Krispies , Corn Flakes  and Special K    Breads, Cereal, Rice and Pasta:   Breads or rolls with nuts, seeds or fruit.   Whole wheat, pumpernickel, rye breads and cornbread.   Potatoes with skin, brown or wild rice, and kasha (buckwheat).     Vegetables:   Tender cooked and canned vegetables without seeds: carrots, asparagus tips, green or wax beans, pumpkin, spinach, lima beans. Vegetables:   Raw or steamed vegetables.   Vegetables with seeds.   Sauerkraut.   Winter squash, peas, broccoli, Brussel sprouts, cabbage, onions, cauliflower, baked beans, peas and corn.   Fruits:   Strained fruit juice.   Canned fruit, except pineapple.   Ripe bananas and melon. Fruits:   Prunes and prune juice.   Raw fruits.   Dried fruits: figs, dates and raisins.   Milk/Dairy:   Milk: plain or flavored.   Yogurt, custard and ice cream.   Cheese and cottage cheese Milk/Dairy:     Meat and other proteins:   ground, well-cooked tender beef, lamb, ham, veal, pork, fish, poultry and organ meats.   Eggs.   Peanut butter without nuts. Meat and other proteins:   Tough, fibrous meats with gristle.   Dry beans, peas and lentils.   Peanut  butter with nuts.   Tofu.   Fats, Snack, Sweets, Condiments and Beverages:   Margarine, butter, oils, mayonnaise, sour cream and salad dressing, plain gravy.   Sugar, hard candy, clear jelly, honey and syrup.   Spices, cooked herbs, bouillon, broth and soups made with allowed vegetable, ketchup and mustard.   Coffee, tea and carbonated drinks.   Plain cakes, cookies and pretzels.   Gelatin, plain puddings, custard, ice cream, sherbet and popsicles. Fats, Snack, Sweets, Condiments and Beverages:   Nuts, seeds and coconut.   Jam, marmalade and preserves.   Pickles, olives, relish and horseradish.   All desserts containing nuts, seeds, dried fruit and coconut; or made from whole grains or bran.   Candy made with nuts or seeds.   Popcorn.           DIRECTIONS TO THE ENDOSCOPY DEPARTMENT     From the north ([facility])  Take [address] South, exit on [address] [address]. Get into the left hand merissa, turn left (east), go one-half mile to [address] and turn left. Go north to the first stoplight, take a right on [address] and follow it to the Emergency entrance.    From the south ([facility])  Take 35N to the 35E split and exit on [address] [address]. On [address] [address], turn left (west) to [address]. Turn right (north) on [address]. Go north to the first stoplight, take a right on [address] and follow it to the Emergency entrance.    From the east via 35E ([facility])  Take [address] south to [address] [address] exit. Turn right on [address] . Go west to [address]. Turn right (north) on [address]. Go to the first stoplight, take a right and follow on [address] to the Emergency entrance.    From the east via [address] ([facility])  Take [address] to [address]. Turn left (south) on [address] to [address]. Turn left (east) on [address] and follow it to the Emergency entrance.    From the west via [address] 13 ([place], [place])  Take  [address] east to [address]. Turn right (south) on [address] to [address]. Turn left (east) on [address] and follow it to the Emergency entrance.

## 2017-06-15 ENCOUNTER — OFFICE VISIT (OUTPATIENT)
Dept: PEDIATRICS | Facility: CLINIC | Age: 67
End: 2017-06-15
Payer: COMMERCIAL

## 2017-06-15 VITALS
DIASTOLIC BLOOD PRESSURE: 80 MMHG | HEART RATE: 75 BPM | BODY MASS INDEX: 31.18 KG/M2 | OXYGEN SATURATION: 98 % | TEMPERATURE: 98.2 F | WEIGHT: 165 LBS | SYSTOLIC BLOOD PRESSURE: 110 MMHG

## 2017-06-15 DIAGNOSIS — R10.13 EPIGASTRIC PAIN: ICD-10-CM

## 2017-06-15 DIAGNOSIS — R03.0 ELEVATED BLOOD-PRESSURE READING WITHOUT DIAGNOSIS OF HYPERTENSION: Primary | ICD-10-CM

## 2017-06-15 DIAGNOSIS — M25.512 ACUTE PAIN OF LEFT SHOULDER: ICD-10-CM

## 2017-06-15 PROCEDURE — 99213 OFFICE O/P EST LOW 20 MIN: CPT | Mod: GE | Performed by: INTERNAL MEDICINE

## 2017-06-15 NOTE — NURSING NOTE
"Chief Complaint   Patient presents with     RECHECK       Initial /80 (BP Location: Right arm, Patient Position: Chair, Cuff Size: Adult Large)  Pulse 75  Temp 98.2  F (36.8  C) (Oral)  Wt 165 lb (74.8 kg)  SpO2 98%  BMI 31.18 kg/m2 Estimated body mass index is 31.18 kg/(m^2) as calculated from the following:    Height as of 5/11/17: 5' 1\" (1.549 m).    Weight as of this encounter: 165 lb (74.8 kg).  Medication Reconciliation: complete.Tere WANG MA      "

## 2017-06-15 NOTE — MR AVS SNAPSHOT
After Visit Summary   6/15/2017    Marck Hernandez    MRN: [id number]           Patient Information     Date Of Birth          1950        Visit Information        Provider Department      6/15/2017 8:00 AM Dana Guzman MD; [facility] [employer] [place]        Today's Diagnoses     Elevated blood-pressure reading without diagnosis of hypertension    -  1    Epigastric pain        Acute pain of left shoulder          Care Instructions    1. Physical therapy ordered.  You will need to call to set up appointment.  THey have a clinic here in this building.     2.  blood pressure machine from pharmacy.  Take blood pressure twice per day at consistent times.  Keep a record and bring it into clinic at your next visit.  Make sure to be resting and calm for a few minutes before reading the blood pressure each time. Sit upright when you take it.      3. Return to clinic in 1-2 weeks for a full physical (tell them this when you schedule the appointment so it is covered by insurance); we will go over the blood pressure recording at that time.  She should also get a pap smear at that time and cholesterol and blood sugar labs as well.            Follow-ups after your visit        Additional Services     [facility] PT, HAND, AND CHIROPRACTIC REFERRAL       **This order will print in the [facility] Scheduling Office**    Physical Therapy, Hand Therapy and Chiropractic Care are available through:    *[facility]  *[facility]  *[facility]    Call one number to schedule at any of the above locations: [phone].Physical Therapy at [facility] or [facility]    Your provider has referred you to:     Indication/Reason for Referral: Shoulder Pain  Onset of Illness: 1.5 weeks ago but has had in the past as well  Therapy Orders: Evaluate and Treat  Special Programs: None  Special Request: [place]      Additional Comments for the Therapist or  "Chiropractor: Neck and/or shoulder primary pathology? Likely needs PT assessment of both     Please be aware that coverage of these services is subject to the terms and limitations of your health insurance plan.  Call member services at your health plan with any benefit or coverage questions.      Please bring the following to your appointment:    *Your personal calendar for scheduling future appointments  *Comfortable clothing                  Who to contact     If you have questions or need follow up information about today's clinic visit or your schedule please contact [facility] [place] directly at [phone].  Normal or non-critical lab and imaging results will be communicated to you by [employer], letter or phone within 4 business days after the clinic has received the results. If you do not hear from us within 7 days, please contact the clinic through [employer]t or phone. If you have a critical or abnormal lab result, we will notify you by phone as soon as possible.  Submit refill requests through [employer] or call your pharmacy and they will forward the refill request to us. Please allow 3 business days for your refill to be completed.          Additional Information About Your Visit        [employer] Information     [employer] lets you send messages to your doctor, view your test results, renew your prescriptions, schedule appointments and more. To sign up, go to [email or web address] . Click on \"Log in\" on the left side of the screen, which will take you to the Welcome page. Then click on \"Sign up Now\" on the right side of the page.     You will be asked to enter the access code listed below, as well as some personal information. Please follow the directions to create your username and password.     Your access code is: [email or web address]  Expires: 2017 11:19 AM     Your access code will  in 90 days. If you need help or a new code, please call your [facility] or [phone].        Care EveryWhere ID  "    This is your Care EveryWhere ID. This could be used by other organizations to access your [place] medical records  [id number]        Your Vitals Were     Pulse Temperature Pulse Oximetry BMI (Body Mass Index)          75 98.2  F (36.8  C) (Oral) 98% 31.18 kg/m2         Blood Pressure from Last 3 Encounters:   06/15/17 110/80   06/08/17 123/90   05/17/17 (!) 132/93    Weight from Last 3 Encounters:   06/15/17 165 lb (74.8 kg)   05/11/17 165 lb (74.8 kg)              We Performed the Following     [facility] PT, HAND, AND CHIROPRACTIC REFERRAL          Today's Medication Changes          These changes are accurate as of: 6/15/17  9:17 AM.  If you have any questions, ask your nurse or doctor.               Start taking these medicines.        Dose/Directions    order for DME   Used for:  Elevated blood-pressure reading without diagnosis of hypertension   Started by:  Dana Guzman MD        Blood pressure cuff machine   Quantity:  1 Units   Refills:  0            Where to get your medicines      These medications were sent to [facility] [place] - [place] - [address]  [address]  [address], [place] [address]     Phone:  [phone]     omeprazole 20 MG CR capsule         Some of these will need a paper prescription and others can be bought over the counter.  Ask your nurse if you have questions.     Bring a paper prescription for each of these medications     order for DME                Primary Care Provider    Physician No Ref-Primary       No address on file        Thank you!     Thank you for choosing [facility]  for your care. Our goal is always to provide you with excellent care. Hearing back from our patients is one way we can continue to improve our services. Please take a few minutes to complete the written survey that you may receive in the mail after your visit with us. Thank you!             Your Updated Medication List - Protect others around you: Learn  how to safely use, store and throw away your medicines at www.disposemymeds.org.          This list is accurate as of: 6/15/17  9:17 AM.  Always use your most recent med list.                   Brand Name Dispense Instructions for use    omeprazole 20 MG CR capsule    priLOSEC    30 capsule    Take 1 capsule (20 mg) by mouth daily       order for DME     1 Units    Blood pressure cuff machine

## 2017-06-15 NOTE — PATIENT INSTRUCTIONS
1. Physical therapy ordered.  You will need to call to set up appointment.  THey have a clinic here in this building.     2.  blood pressure machine from pharmacy.  Take blood pressure twice per day at consistent times.  Keep a record and bring it into clinic at your next visit.  Make sure to be resting and calm for a few minutes before reading the blood pressure each time. Sit upright when you take it.      3. Return to clinic in 1-2 weeks for a full physical (tell them this when you schedule the appointment so it is covered by insurance); we will go over the blood pressure recording at that time.  She should also get a pap smear at that time and cholesterol and blood sugar labs as well.

## 2017-06-15 NOTE — PROGRESS NOTES
SUBJECTIVE:                                                    Marck Hernandez is a 67 year old female who presents to clinic today for the following health issues:      ED/UC Followup:    Facility:   ED  Date of visit: 5/17/2017  Reason for visit: elevated blood pressure/epgastric abdominal pain  Current Status: abdominal pain is better, had colonoscopy6/8. now having left shoulder pain radiates down arm into wrist, does not recall specific injury to the area x1 week      1. Abdominal pain/h.pylori follow up:   Completed clarithromycin and amoxicillin without difficulty.  Still on the PPI.  Would like to continue the PPI.  Abdominal pain improved with PPI, but would like to continue as she still has some residual GERD-like symptoms.  Has been careful about timing of eating, spicy foods, etc.      2. HTN follow up:  Had dentist appointment on 5/17 and was noted to have BP > 200/80 at that visit, went to ER and BP was 160/99 but improved to systolics 130's prior to discharge without intervention.  Workup for end organ damage at that time was all negative.  She was asymptomatic.  They recommended follow up with PCP for further evaluation and management. She continues to have no headache, chest pain, dizziness, nausea, diaphoresis.  See left shoulder pain below.      3. Left shoulder pain:   Tingling and sharp pain from shoulder down to wrist on left.  Sometimes goes up to the neck.  Occurred a little over a week ago.  Cannot recall any incident that would have caused this, doesn't lift heavy things and is not very active.  Has had this in the neck and shoulder back in [place], but hasn't had it in a while. It comes and goes, no specific pattern to onset except worse in the morning.  Son mentioned the arm looks swollen at the elbow.  Not worse with walking or going up stairs, not associated with dizziness or diaphoresis.  Has to use her right arm to get ready in the morning because daily activities such as getting  dressed can make it worse.  Feels her hand can get numb and shaky.  No weakness, not dropping things.        Problem list and histories reviewed & adjusted, as indicated.  Additional history: as documented    There is no problem list on file for this patient.     Past Medical History:   Diagnosis Date     GERD (gastroesophageal reflux disease)      HTN (hypertension)     Previously treated in [facility]", not on medication since approx 2014        Past Surgical History:   Procedure Laterality Date     COLONOSCOPY N/A 6/8/2017    Procedure: COLONOSCOPY;  COLONOSCOPY;  Surgeon: Alpesh Clark MD;  Location: [facility]     NO HISTORY OF SURGERY         Social History   Substance Use Topics     Smoking status: Never Smoker     Smokeless tobacco: Never Used     Alcohol use No     Family History   Problem Relation Age of Onset     DIABETES No family hx of      HEART DISEASE No family hx of      Thyroid Disease No family hx of            Reviewed and updated as needed this visit by clinical staff  Tobacco  Allergies  Med Hx  Surg Hx  Fam Hx  Soc Hx      Reviewed and updated as needed this visit by Provider         ROS:  8 point ROS obtained, see above for pertinent positives     OBJECTIVE:                                                    /80 (BP Location: Right arm, Patient Position: Chair, Cuff Size: Adult Large)  Pulse 75  Temp 98.2  F (36.8  C) (Oral)  Wt 165 lb (74.8 kg)  SpO2 98%  BMI 31.18 kg/m2  Body mass index is 31.18 kg/(m^2).  Gen: Pleasant, alert, NAD  HEENT: NC/AT, EOMI, no scleral icterus, MMM  Resp: CTAB  CV: RRR, no murmurs, no extra heart sounds, no JVD, 2+ radial pulses   Abd: Soft, ND, NT, active BS  Extrem: WWP, no edema  MSK: Tender to palpation over AC joint but also along trapezius and superior lateral delt.  Full ROM in bilateral shoulders in all directions.  5/5 strength in biceps (though caused pain on left), triceps, shoulder abduction and internal and external rotation.       No diagnostic tests    NOTE ON PRIOR LAB WORKUP AFTER RECORDS FROM [facility] RETURNED FOLLOWING LAST VISIT  - Quant gold neg  - CBC and BMP ok  - Total cholesterol and LDL slightly high  - Records indicate she did NOT have a pap smear done at that visit although patient and her son are 100% certain she did, they are not sure why it wouldn't have come over with the other labs.  They said it was negative.        ASSESSMENT/PLAN:                                                        (R03.0) Elevated blood-pressure reading without diagnosis of hypertension  (primary encounter diagnosis)  Comment: Has had quite variable blood pressure readings with some very concerning reads (at dentist) but also very normal today and would not likely do well on an antihypertensive given her BP today.  Will order BP cuff for home readings, they will do BP twice per day until follow up, keep log, and follow up in 1-2 weeks.  Discussed risks of higher BP's and importance of follow up.  Has known h/o HTN previously and I anticipate she will likely end up needing some medication but probably at low dose.    Plan: order for DME for home BP cuff   - Bring log into clinic in 1-2 weeks     (R10.13) Epigastric pain  Comment: H.pylori positive s/p treatment. Ongoing GERD but overall improved. Patient and son want her to stay on PPI for now given more benign heartburn symptoms.  Discussed other measures to decrease heartburn symptoms which she is working on and are helping.  OK to stay on PPI but will need to occasionally revisit this as it would be ideal to discontinue at some point.   Plan: omeprazole (PRILOSEC) 20 MG CR capsule            (M25.512) Acute pain of left shoulder  Comment: Likely impingement vs rotator cuff vs possibly spinal stenosis in the cervical spine. As there is no positive neuro findings on exam today (no weakness, sensation intact), will start with PT. If not improving after 2 months, can rediscuss and consider  MRI of shoulder and/or neck. No other concerning signs or symptoms to suggest ACS. Although son notes arm swells at times, it is definitely not swollen in any way today and therefore do not feel US to rule out DVT is needed as this is much less likely.   Plan: [facility] PT, HAND, AND CHIROPRACTIC REFERRAL              Follow up for full physical (needs lipid panel, hgb A1c, vaccines updated, discuss whether or not she should have one more pap to record a negative prior to discontinuing screening), return in     Dana Guzman MD  [facility]    Attestation:    I have reviewed the documentation from Dr. Guzman and discussed the findings with Dr. Guzman. I agree with the documentation of Dr. Guzman.    Amanda Arteaga MD  Internal Medicine/Pediatrics  [facility]

## 2017-06-20 ENCOUNTER — THERAPY VISIT (OUTPATIENT)
Dept: PHYSICAL THERAPY | Facility: CLINIC | Age: 67
End: 2017-06-20
Payer: COMMERCIAL

## 2017-06-20 DIAGNOSIS — M25.512 ACUTE PAIN OF LEFT SHOULDER: ICD-10-CM

## 2017-06-20 DIAGNOSIS — M54.2 CERVICAL PAIN: Primary | ICD-10-CM

## 2017-06-20 PROCEDURE — 97110 THERAPEUTIC EXERCISES: CPT | Mod: GP | Performed by: PHYSICAL THERAPIST

## 2017-06-20 PROCEDURE — 97161 PT EVAL LOW COMPLEX 20 MIN: CPT | Mod: GP | Performed by: PHYSICAL THERAPIST

## 2017-06-20 NOTE — PROGRESS NOTES
Subjective:    Patient is a 67 year old female presenting with rehab cervical spine hpi. The history is provided by the patient. A  was used.   Marck Hernandez is a 67 year old female with a cervical spine condition.      This is a new condition  Left shoulder and neck pain started around 6-15-17..    Patient reports pain:  Cervical right side and cervical left side.  Radiates to:  Shoulder left and upper arm left.  Pain is described as sharp and aching and is intermittent and constant and reported as 7/10.  Associated symptoms:  Loss of motion/stiffness, loss of strength and tingling. Pain is the same all the time.  Symptoms are exacerbated by rotating head and relieved by activity/movement.  Since onset symptoms are unchanged.        General health as reported by patient is good.          Current occupation is homemaker.                                    Objective:    System              Cervical/Thoracic Evaluation    AROM:  AROM Cervical:    Flexion:           75% pdm  Extension:       75%   Rotation:         Left: 75% pdm opp side     Right: 75% pdm opp side  Side Bend:      Left: 75% pdm     Right:       Headaches: none  Cervical Myotomes:        C4 (shrug):  Left: 5    Right: 5  C5 (Deltoid):  Left: 4+    Right: 4+  C6 (Biceps):  Left: 4+    Right: 4+  C7 (Triceps):  Left: 4+    Right: 4+  C8 (Thumb Ext): Left: 5    Right: 5  T1 (Intrinsics): Left: 5    Right: 5  DTR's:  not assessed          Cervical Dermatomes:  normal                    Cervical Palpation:    Tenderness present at Left:    Scalenes; Upper Trap and Levator                                                    Kay Cervical Evaluation    Posture:  Sitting: poor  Standing: poor  Protruding Head: yes  Wry Neck: no  Correction of Posture: better    Movement Loss:  Protrusion (PRO): pain  Flexion (Flex): min and pain  Retraction (RET): mod  Extension (EXT): min and pain      Rotation Right (ROT R): pain and min  Rotation Left  (ROT L): pain and min  Test Movements:      RET:   Repeat RET: During: decreases  After: better  Mechanical Response: IncROM                          Conclusion: derangement  Principle of Treatment:  Posture Correction: sitting posture    Extension: begin with repeated cervical retraction every 1-2 hours                                             ROS    Assessment/Plan:      Patient is a 67 year old female with cervical and left side shoulder complaints.    Patient has the following significant findings with corresponding treatment plan.                Diagnosis 1:  Cervical pain with radicular symptoms  Pain -  hot/cold therapy, manual therapy, self management, education, directional preference exercise and home program  Decreased ROM/flexibility - manual therapy, therapeutic exercise and home program  Decreased strength - therapeutic exercise, therapeutic activities and home program  Decreased function - therapeutic activities and home program  Impaired posture - neuro re-education and home program    Therapy Evaluation Codes:   1) History comprised of:   Personal factors that impact the plan of care:      Language.    Comorbidity factors that impact the plan of care are:      Chest pain, High blood pressure and Pain at night/rest.     Medications impacting care: None.  2) Examination of Body Systems comprised of:   Body structures and functions that impact the plan of care:      Cervical spine and Shoulder.   Activity limitations that impact the plan of care are:      Cooking, Dressing, Lifting and Sleeping.  3) Clinical presentation characteristics are:   Evolving/Changing.  4) Decision-Making    Low complexity using standardized patient assessment instrument and/or measureable assessment of functional outcome.  Cumulative Therapy Evaluation is: Low complexity.    Previous and current functional limitations:  (See Goal Flow Sheet for this information)    Short term and Long term goals: (See Goal Flow Sheet for  this information)     Communication ability:  Patient has an  for communication clarity.  Pt accompanied by her son and interpretor. Her son had demonstrated good understanding of exercise technique and lives with pt. He is willing and able to assist her with HEP.  Treatment Explanation - The following has been discussed with the patient:   RX ordered/plan of care  Anticipated outcomes  Possible risks and side effects  This patient would benefit from PT intervention to resume normal activities. Will continue to assess shoulder,however symptoms appear to be related to cervical spine at this time.Rehab potential is excellent.    Frequency:  1 X week, once daily  Duration:  for 6 weeks  Discharge Plan:  Achieve all LTG.  Independent in home treatment program.  Reach maximal therapeutic benefit.    Please refer to the daily flowsheet for treatment today, total treatment time and time spent performing 1:1 timed codes.

## 2017-10-02 PROBLEM — M54.2 CERVICAL PAIN: Status: RESOLVED | Noted: 2017-06-20 | Resolved: 2017-10-02

## 2017-10-02 PROBLEM — M25.512 ACUTE PAIN OF LEFT SHOULDER: Status: RESOLVED | Noted: 2017-06-20 | Resolved: 2017-10-02

## 2017-10-02 NOTE — PROGRESS NOTES
Pt did not return for continued treatment. She will be discharged from PT at this time. Refer to initial evaluation on 6-20-17 for final status.

## 2018-07-11 ENCOUNTER — OFFICE VISIT (OUTPATIENT)
Dept: PEDIATRICS | Facility: CLINIC | Age: 68
End: 2018-07-11
Payer: COMMERCIAL

## 2018-07-11 VITALS
HEIGHT: 61 IN | HEART RATE: 74 BPM | OXYGEN SATURATION: 99 % | DIASTOLIC BLOOD PRESSURE: 80 MMHG | WEIGHT: 172 LBS | TEMPERATURE: 97.9 F | SYSTOLIC BLOOD PRESSURE: 150 MMHG | BODY MASS INDEX: 32.47 KG/M2

## 2018-07-11 DIAGNOSIS — K21.9 GASTROESOPHAGEAL REFLUX DISEASE WITHOUT ESOPHAGITIS: ICD-10-CM

## 2018-07-11 DIAGNOSIS — Z12.31 ENCOUNTER FOR SCREENING MAMMOGRAM FOR BREAST CANCER: ICD-10-CM

## 2018-07-11 DIAGNOSIS — Z23 NEED FOR PROPHYLACTIC VACCINATION AGAINST STREPTOCOCCUS PNEUMONIAE (PNEUMOCOCCUS): ICD-10-CM

## 2018-07-11 DIAGNOSIS — E78.5 HYPERLIPIDEMIA WITH TARGET LDL LESS THAN 130: ICD-10-CM

## 2018-07-11 DIAGNOSIS — I10 ESSENTIAL HYPERTENSION: ICD-10-CM

## 2018-07-11 DIAGNOSIS — Z13.820 SCREENING FOR OSTEOPOROSIS: ICD-10-CM

## 2018-07-11 DIAGNOSIS — Z00.00 ENCOUNTER FOR ROUTINE ADULT HEALTH EXAMINATION WITHOUT ABNORMAL FINDINGS: Primary | ICD-10-CM

## 2018-07-11 PROCEDURE — 90732 PPSV23 VACC 2 YRS+ SUBQ/IM: CPT | Performed by: INTERNAL MEDICINE

## 2018-07-11 PROCEDURE — G0009 ADMIN PNEUMOCOCCAL VACCINE: HCPCS | Performed by: INTERNAL MEDICINE

## 2018-07-11 PROCEDURE — 99214 OFFICE O/P EST MOD 30 MIN: CPT | Mod: 25 | Performed by: INTERNAL MEDICINE

## 2018-07-11 PROCEDURE — 99397 PER PM REEVAL EST PAT 65+ YR: CPT | Mod: 25 | Performed by: INTERNAL MEDICINE

## 2018-07-11 RX ORDER — HYDROCHLOROTHIAZIDE 25 MG/1
25 TABLET ORAL DAILY
Qty: 30 TABLET | Refills: 1 | Status: SHIPPED | OUTPATIENT
Start: 2018-07-11 | End: 2018-08-06

## 2018-07-11 ASSESSMENT — ENCOUNTER SYMPTOMS
NAUSEA: 0
BREAST MASS: 0
PALPITATIONS: 0
PARESTHESIAS: 0
HEARTBURN: 0
ARTHRALGIAS: 0
NERVOUS/ANXIOUS: 0
EYE PAIN: 0
FREQUENCY: 0
HEADACHES: 0
HEMATOCHEZIA: 0
DIARRHEA: 0
MYALGIAS: 0
ABDOMINAL PAIN: 1
FEVER: 0
CONSTIPATION: 1
JOINT SWELLING: 0
BACK PAIN: 1
DYSURIA: 0
CHILLS: 0
COUGH: 1
SORE THROAT: 0
WEAKNESS: 0
DIZZINESS: 0
SHORTNESS OF BREATH: 0
HEMATURIA: 0

## 2018-07-11 ASSESSMENT — ACTIVITIES OF DAILY LIVING (ADL)
I_NEED_ASSISTANCE_FOR_THE_FOLLOWING_DAILY_ACTIVITIES:: NO ASSISTANCE IS NEEDED
CURRENT_FUNCTION: NO ASSISTANCE NEEDED

## 2018-07-11 NOTE — PROGRESS NOTES
SUBJECTIVE:   Marck Hernandez is a 68 year old female who presents for Preventive Visit.    Are you in the first 12 months of your Medicare coverage?  No    Physical   Annual:     Getting at least 3 servings of Calcium per day:  Yes    Bi-annual eye exam:  NO    Dental care twice a year:  NO    Sleep apnea or symptoms of sleep apnea:  None    Frequency of exercise:  None    Taking medications regularly:  Yes    Medication side effects:  Not applicable    Additional concerns today:  No    Ability to successfully perform activities of daily living: no assistance needed    Home Safety:  No safety concerns identified    Hearing Impairment: no hearing concerns      Additional concerns today: 1- epigastric/left upper quadrant burning discomfort intermittent for the past few weeks.  Originally from [place], had ongoing similar symptoms while living abroad.  Symptoms have improved since moving to the [place].  Has reduced caffeine intake which helped.  Denies ibuprofen use.  Denies stool changes melena or hematochezia        2-hypertension.  Prior visits for borderline hypertension.  Denies headache vision changes or other symptoms.  History of borderline elevated blood pressures    COGNITIVE SCREEN  1) Repeat 3 items (Leader, Season, Table)    2) Clock draw knows the date and time but doesn't speak english or know how to read unable to draw clock  :3) 3 item recall: Recalls 2 objects   Results: 1-2 items recalled: COGNITIVE IMPAIRMENT LESS LIKELY    Mini-CogTM Copyright S [employer]. Licensed by the author for use in [facility]; reprinted with permission (maira@.[facility]). All rights reserved.        Reviewed and updated as needed this visit by clinical staff  Tobacco  Allergies  Meds         Reviewed and updated as needed this visit by Provider        Social History   Substance Use Topics     Smoking status: Never Smoker     Smokeless tobacco: Never Used     Alcohol use No       Alcohol Use 7/11/2018   If you  drink alcohol do you typically have greater than 3 drinks per day OR greater than 7 drinks per week? Not Applicable           Stomach pains after eating    Today's PHQ-2 Score:   PHQ-2 ( 1999 [employer]) 7/11/2018   Q1: Little interest or pleasure in doing things 0   Q2: Feeling down, depressed or hopeless 0   PHQ-2 Score 0   Q1: Little interest or pleasure in doing things Not at all   Q2: Feeling down, depressed or hopeless Not at all   PHQ-2 Score 0       Do you feel safe in your environment - Yes    Do you have a Health Care Directive?: No: Advance care planning was reviewed with patient; patient declined at this time.    Current providers sharing in care for this patient include:   Patient Care Team:  No Ref-Primary, Physician as PCP - General    The following health maintenance items are reviewed in Epic and correct as of today:  Health Maintenance   Topic Date Due     PHQ-2 Q1 YR  04/12/1963     HEPATITIS C SCREENING  04/12/1969     ADVANCE DIRECTIVE PLANNING Q5 YRS  04/12/2006     FALL RISK ASSESSMENT  04/12/2016     DEXA SCAN SCREENING (SYSTEM ASSIGNED)  04/12/2016     PNEUMOCOCCAL (1 of 2 - PCV13) 04/12/2016     INFLUENZA VACCINE (1) 09/01/2018     MAMMO SCREEN Q2 YR (SYSTEM ASSIGNED)  05/16/2019     LIPID SCREEN Q5 YR FEMALE (SYSTEM ASSIGNED)  06/18/2020     TETANUS IMMUNIZATION (SYSTEM ASSIGNED)  05/10/2026     COLON CANCER SCREEN (SYSTEM ASSIGNED)  06/08/2027     Patient Active Problem List   Diagnosis     Hyperlipidemia with target LDL less than 130     Essential hypertension     Gastroesophageal reflux disease without esophagitis     Past Medical History:   Diagnosis Date     GERD (gastroesophageal reflux disease)      HTN (hypertension)     Previously treated in [facility], not on medication since approx 2014       Past Surgical History:   Procedure Laterality Date     COLONOSCOPY N/A 6/8/2017    Procedure: COLONOSCOPY;  COLONOSCOPY;  Surgeon: Alpesh Clark MD;  Location:  GI     NO HISTORY OF  "SURGERY         Family History   Problem Relation Age of Onset     Diabetes No family hx of      HEART DISEASE No family hx of      Thyroid Disease No family hx of        ALLERGIES   No Known Allergies      Review of Systems   Constitutional: Negative for chills and fever.   HENT: Negative for congestion, ear pain, hearing loss and sore throat.    Eyes: Negative for pain and visual disturbance.   Respiratory: Positive for cough. Negative for shortness of breath.    Cardiovascular: Positive for chest pain. Negative for palpitations and peripheral edema.   Gastrointestinal: Positive for abdominal pain and constipation. Negative for diarrhea, heartburn, hematochezia and nausea.   Breasts:  Negative for tenderness, breast mass and discharge.   Genitourinary: Negative for dysuria, frequency, genital sores, hematuria, pelvic pain, urgency, vaginal bleeding and vaginal discharge.   Musculoskeletal: Positive for back pain. Negative for arthralgias, joint swelling and myalgias.   Skin: Negative for rash.   Neurological: Negative for dizziness, weakness, headaches and paresthesias.   Psychiatric/Behavioral: Negative for mood changes. The patient is not nervous/anxious.          OBJECTIVE:   /80 (Cuff Size: Adult Regular)  Pulse 74  Temp 97.9  F (36.6  C) (Oral)  Ht 5' 1\" (1.549 m)  Wt 172 lb (78 kg)  SpO2 99%  BMI 32.5 kg/m2 Estimated body mass index is 32.5 kg/(m^2) as calculated from the following:    Height as of this encounter: 5' 1\" (1.549 m).    Weight as of this encounter: 172 lb (78 kg).  Physical Exam  GENERAL APPEARANCE: alert and no distress  EYES: Eyes grossly normal to inspection, PERRL and conjunctivae and sclerae normal  HENT: ear canals and TM's normal, nose and mouth without ulcers or lesions, oropharynx clear and oral mucous membranes moist  NECK: no adenopathy, no asymmetry, masses, or scars and thyroid normal to palpation  RESP: lungs clear to auscultation - no rales, rhonchi or wheezes  CV: " regular rate and rhythm, normal S1 S2, no S3 or S4, no murmur, click or rub, no peripheral edema and peripheral pulses strong  ABDOMEN: soft, nontender, no hepatosplenomegaly, no masses and bowel sounds normal  MS: no musculoskeletal defects are noted and gait is age appropriate without ataxia  SKIN: no suspicious lesions or rashes  NEURO: Normal strength and tone, sensory exam grossly normal, mentation intact and speech normal  PSYCH: mentation appears normal and affect normal/bright      ASSESSMENT / PLAN:       ICD-10-CM    1. Encounter for routine adult health examination without abnormal findings Z00.00   Colonoscopy 2017     2. Essential hypertension I10 hydrochlorothiazide (HYDRODIURIL) 25 MG tablet      New diagnosis.  Start HCTZ-side effects reviewed.  Return for blood pressure recheck 2 weeks     3. Gastroesophageal reflux disease without esophagitis K21.9 ranitidine (ZANTAC) 300 MG tablet      Epigastric discomfort likely GERD.  Start ranitidine.   A handout with pertinent patient health education information is given.      4. Hyperlipidemia with target LDL less than 130 E78.5 Lipid panel reflex to direct LDL Fasting     Comprehensive metabolic panel     Hemoglobin A1c       Lab Results   Component Value Date     06/18/2015         Needs follow-up lab work.  Return for fasting lab work.  Recommend statin if LDL has increased     5. Encounter for screening mammogram for breast cancer Z12.31 *MA Screening Digital Bilateral       6. Screening for osteoporosis Z13.820 DX Hip/Pelvis/Spine     7. Need for prophylactic vaccination against Streptococcus pneumoniae (pneumococcus) Z23 Pneumococcal vaccine 23 valent PPSV23  (Pneumovax) [52323]     ADMIN: Vaccine, Initial (83278)     ADMIN MEDICARE: Pneumococcal Vaccine ()       End of Life Planning:  Patient currently has an advanced directive: No.  I have verified the patient's ablity to prepare an advanced directive/make health care decisions.   "Literature was provided to assist patient in preparing an advanced directive.    COUNSELING:  Reviewed preventive health counseling, as reflected in patient instructions       Regular exercise       Healthy diet/nutrition       Osteoporosis Prevention/Bone Health       Colon cancer screening    BP Readings from Last 1 Encounters:   07/11/18 150/80     Estimated body mass index is 32.5 kg/(m^2) as calculated from the following:    Height as of this encounter: 5' 1\" (1.549 m).    Weight as of this encounter: 172 lb (78 kg).      Weight management plan: Discussed healthy diet and exercise guidelines and patient will follow up in 12 months in clinic to re-evaluate.     reports that she has never smoked. She has never used smokeless tobacco.      Appropriate preventive services were discussed with this patient, including applicable screening as appropriate for cardiovascular disease, diabetes, osteopenia/osteoporosis, and glaucoma.  As appropriate for age/gender, discussed screening for colorectal cancer, prostate cancer, breast cancer, and cervical cancer. Checklist reviewing preventive services available has been given to the patient.    Reviewed patients plan of care and provided an AVS. The Basic Care Plan (routine screening as documented in Health Maintenance) for Marck meets the Care Plan requirement. This Care Plan has been established and reviewed with the Patient and son.    Counseling Resources:  ATP IV Guidelines  Pooled Cohorts Equation Calculator  Breast Cancer Risk Calculator  FRAX Risk Assessment  ICSI Preventive Guidelines  Dietary Guidelines for Americans, 2010  USDA's MyPlate  ASA Prophylaxis  Lung CA Screening    Duke Zhu MD, MD  [facility]  "

## 2018-07-11 NOTE — NURSING NOTE
Screening Questionnaire for Adult Immunization    Are you sick today?   No   Do you have allergies to medications, food, a vaccine component or latex?   No   Have you ever had a serious reaction after receiving a vaccination?   No   Do you have a long-term health problem with heart disease, lung disease, asthma, kidney disease, metabolic disease (e.g. diabetes), anemia, or other blood disorder?   No   Do you have cancer, leukemia, HIV/AIDS, or any other immune system problem?   No   In the past 3 months, have you taken medications that affect  your immune system, such as prednisone, other steroids, or anticancer drugs; drugs for the treatment of rheumatoid arthritis, Crohn s disease, or psoriasis; or have you had radiation treatments?   No   Have you had a seizure, or a brain or other nervous system problem?   No   During the past year, have you received a transfusion of blood or blood     products, or been given immune (gamma) globulin or antiviral drug?   No   For women: Are you pregnant or is there a chance you could become        pregnant during the next month?   No   Have you received any vaccinations in the past 4 weeks?   No     Immunization questionnaire answers were all negative.             Screening performed by Amanda Baker on 7/11/2018 at 10:06 AM.

## 2018-07-11 NOTE — PATIENT INSTRUCTIONS
INSTRUCTIONS FOR TODAY:     acid reflux.  Start ranitidine and review handout.   high blood pressure start hydrochlorothiazide once daily    follow-up visit in 2-3 weeks   schedule mammogram and DEXA scans     Dr Zhu    Preventive Health Recommendations    Female Ages 65 +    Yearly exam:     See your health care provider every year in order to  o Review health changes.   o Discuss preventive care.    o Review your medicines if your doctor has prescribed any.      You no longer need a yearly Pap test unless you've had an abnormal Pap test in the past 10 years. If you have vaginal symptoms, such as bleeding or discharge, be sure to talk with your provider about a Pap test.      Every 1 to 2 years, have a mammogram.  If you are over 69, talk with your health care provider about whether or not you want to continue having screening mammograms.      Every 10 years, have a colonoscopy. Or, have a yearly FIT test (stool test). These exams will check for colon cancer.       Have a cholesterol test every 5 years, or more often if your doctor advises it.       Have a diabetes test (fasting glucose) every three years. If you are at risk for diabetes, you should have this test more often.       At age 65, have a bone density scan (DEXA) to check for osteoporosis (brittle bone disease).    Shots:    Get a flu shot each year.    Get a tetanus shot every 10 years.    Talk to your doctor about your pneumonia vaccines. There are now two you should receive - Pneumovax (PPSV 23) and Prevnar (PCV 13).    Talk to your pharmacist about the shingles vaccine.    Talk to your doctor about the hepatitis B vaccine.    Nutrition:     Eat at least 5 servings of fruits and vegetables each day.      Eat whole-grain bread, whole-wheat pasta and brown rice instead of white grains and rice.      Get adequate Calcium and Vitamin D.     Lifestyle    Exercise at least 150 minutes a week (30 minutes a day, 5 days a week). This will help you control  your weight and prevent disease.      Limit alcohol to one drink per day.      No smoking.       Wear sunscreen to prevent skin cancer.       See your dentist twice a year for an exam and cleaning.      See your eye doctor every 1 to 2 years to screen for conditions such as glaucoma, macular degeneration and cataracts.

## 2018-07-11 NOTE — MR AVS SNAPSHOT
After Visit Summary   7/11/2018    Marck Hernandez    MRN: [id number]           Patient Information     Date Of Birth          1950        Visit Information        Provider Department      7/11/2018 8:45 AM Duke Zhu MD; LANGUAGE [facility]        Today's Diagnoses     Encounter for routine adult health examination without abnormal findings    -  1    Essential hypertension        Hyperlipidemia with target LDL less than 130        Encounter for screening mammogram for breast cancer        Screening for osteoporosis        Gastroesophageal reflux disease without esophagitis        Need for prophylactic vaccination against Streptococcus pneumoniae (pneumococcus)          Care Instructions    INSTRUCTIONS FOR TODAY:     acid reflux.  Start ranitidine and review handout.   high blood pressure start hydrochlorothiazide once daily    follow-up visit in 2-3 weeks   schedule mammogram and DEXA scans     Dr Zhu    Preventive Health Recommendations    Female Ages 65 +    Yearly exam:     See your health care provider every year in order to  o Review health changes.   o Discuss preventive care.    o Review your medicines if your doctor has prescribed any.      You no longer need a yearly Pap test unless you've had an abnormal Pap test in the past 10 years. If you have vaginal symptoms, such as bleeding or discharge, be sure to talk with your provider about a Pap test.      Every 1 to 2 years, have a mammogram.  If you are over 69, talk with your health care provider about whether or not you want to continue having screening mammograms.      Every 10 years, have a colonoscopy. Or, have a yearly FIT test (stool test). These exams will check for colon cancer.       Have a cholesterol test every 5 years, or more often if your doctor advises it.       Have a diabetes test (fasting glucose) every three years. If you are at risk for diabetes, you should have this test more often.       At  age 65, have a bone density scan (DEXA) to check for osteoporosis (brittle bone disease).    Shots:    Get a flu shot each year.    Get a tetanus shot every 10 years.    Talk to your doctor about your pneumonia vaccines. There are now two you should receive - Pneumovax (PPSV 23) and Prevnar (PCV 13).    Talk to your pharmacist about the shingles vaccine.    Talk to your doctor about the hepatitis B vaccine.    Nutrition:     Eat at least 5 servings of fruits and vegetables each day.      Eat whole-grain bread, whole-wheat pasta and brown rice instead of white grains and rice.      Get adequate Calcium and Vitamin D.     Lifestyle    Exercise at least 150 minutes a week (30 minutes a day, 5 days a week). This will help you control your weight and prevent disease.      Limit alcohol to one drink per day.      No smoking.       Wear sunscreen to prevent skin cancer.       See your dentist twice a year for an exam and cleaning.      See your eye doctor every 1 to 2 years to screen for conditions such as glaucoma, macular degeneration and cataracts.          Follow-ups after your visit        Your next 10 appointments already scheduled     Jul 30, 2018  7:20 AM CDT   LAB with EA LAB   [facility] [place] ([facility])    [address] [place] [address]  [address]  [facility] [address] [phone]           Please do not eat 10-12 hours before your appointment if you are coming in fasting for labs on lipids, cholesterol, or glucose (sugar). This does not apply to pregnant women. Water, hot tea and black coffee (with nothing added) are okay. Do not drink other fluids, diet soda or chew gum.            Jul 30, 2018  7:40 AM CDT   Office Visit with Duke Zhu MD   [facility] [place] ([facility])    [address] [place] [address]  [address]  [facility] [address] [phone]           Bring a current list of meds and any records pertaining to this visit. For Physicals, please bring  "immunization records and any forms needing to be filled out. Please arrive 10 minutes early to complete paperwork.            Jul 30, 2018  9:00 AM CDT   MA SCREENING DIGITAL BILATERAL with EAMA1   [facility] ([facility])    [address] [place] [address],[address]  [place] [address] [phone]           Do not use any powder, lotion or deodorant under your arms or on your breast. If you do, we will ask you to remove it before your exam.  Wear comfortable, two-piece clothing.  If you have any allergies, tell your care team.  Bring any previous mammograms from other facilities or have them mailed to the breast center. Three-dimensional (3D) mammograms are available at [place] locations in [facility], [facility], [facility], and [place]. [facility] locations include [facility] in [place]. Benefits of 3D mammograms include: - Improved rate of cancer detection - Decreases your chance of having to go back for more tests, which means fewer: - \"False-positive\" results (This means that there is an abnormal area but it isn't cancer.) - Invasive testing procedures, such as a biopsy or surgery - Can provide clearer images of the breast if you have dense breast tissue. 3D mammography is an optional exam that anyone can have with a 2D mammogram. It doesn't replace or take the place of a 2D mammogram. 2D mammograms remain an effective screening test for all women.  Not all insurance companies cover the cost of a 3D mammogram. Check with your insurance.            Jul 30, 2018  9:15 AM CDT   DX HIP/PELVIS/SPINE with EADX1   [facility] [place] ([facility])    [address]  [address]  [place] [address] [phone]           Please do not take any of the following 24 hours prior to the day of your exam: vitamins, calcium tablets, antacids.  If possible, please wear clothes without metal (snaps, " "zippers). A sweatsuit works well.              Future tests that were ordered for you today     Open Future Orders        Priority Expected Expires Ordered    Lipid panel reflex to direct LDL Fasting Routine  9/11/2018 7/11/2018    Comprehensive metabolic panel Routine  9/11/2018 7/11/2018    Hemoglobin A1c Routine  9/11/2018 7/11/2018    *MA Screening Digital Bilateral Routine  7/11/2019 7/11/2018    DX Hip/Pelvis/Spine Routine  7/11/2019 7/11/2018            Who to contact     If you have questions or need follow up information about today's clinic visit or your schedule please contact [facility] directly at [phone].  Normal or non-critical lab and imaging results will be communicated to you by MyChart, letter or phone within 4 business days after the clinic has received the results. If you do not hear from us within 7 days, please contact the clinic through MyChart or phone. If you have a critical or abnormal lab result, we will notify you by phone as soon as possible.  Submit refill requests through [employer] or call your pharmacy and they will forward the refill request to us. Please allow 3 business days for your refill to be completed.          Additional Information About Your Visit        Care EveryWhere ID     This is your Care EveryWhere ID. This could be used by other organizations to access your [facility] medical records  [id number]        Your Vitals Were     Pulse Temperature Height Pulse Oximetry BMI (Body Mass Index)       74 97.9  F (36.6  C) (Oral) 5' 1\" (1.549 m) 99% 32.5 kg/m2        Blood Pressure from Last 3 Encounters:   07/11/18 150/80   06/15/17 110/80   06/08/17 123/90    Weight from Last 3 Encounters:   07/11/18 172 lb (78 kg)   06/15/17 165 lb (74.8 kg)   05/11/17 165 lb (74.8 kg)              We Performed the Following     ADMIN MEDICARE: Pneumococcal Vaccine ()     ADMIN: Vaccine, Initial (03788)     Pneumococcal vaccine 23 valent PPSV23  (Pneumovax) [67124]        "   Today's Medication Changes          These changes are accurate as of 7/11/18  9:53 AM.  If you have any questions, ask your nurse or doctor.               Start taking these medicines.        Dose/Directions    hydrochlorothiazide 25 MG tablet   Commonly known as:  HYDRODIURIL   Used for:  Essential hypertension   Started by:  Duke Zhu MD        Dose:  25 mg   Take 1 tablet (25 mg) by mouth daily   Quantity:  30 tablet   Refills:  1       ranitidine 300 MG tablet   Commonly known as:  ZANTAC   Used for:  Gastroesophageal reflux disease without esophagitis   Started by:  Duke Zhu MD        Dose:  300 mg   Take 1 tablet (300 mg) by mouth At Bedtime   Quantity:  30 tablet   Refills:  1            Where to get your medicines      These medications were sent to [facility] - [address]   [address]  [address], [place] [address]     Phone:  [phone]     hydrochlorothiazide 25 MG tablet    ranitidine 300 MG tablet                Primary Care Provider Fax #    Physician No Ref-Primary [phone]       No address on file        Equal Access to Services     [facility]: Hadii yash ku hadasho Soomaali, waaxda luqadaha, qaybta kaalmada adeegyada, waxay idiin hayartemio sumner . So [facility] [phone].    ATENCIÓN: Si habla español, tiene a law disposición servicios gratuitos de asistencia lingüística. Llame al [phone].    We comply with applicable federal civil rights laws and [place] laws. We do not discriminate on the basis of race, color, national origin, age, disability, sex, sexual orientation, or gender identity.            Thank you!     Thank you for choosing [facility]  for your care. Our goal is always to provide you with excellent care. Hearing back from our patients is one way we can continue to improve our services. Please take a few minutes to complete the written survey that you may receive in the  mail after your visit with us. Thank you!             Your Updated Medication List - Protect others around you: Learn how to safely use, store and throw away your medicines at www.disposemymeds.org.          This list is accurate as of 7/11/18  9:53 AM.  Always use your most recent med list.                   Brand Name Dispense Instructions for use Diagnosis    hydrochlorothiazide 25 MG tablet    HYDRODIURIL    30 tablet    Take 1 tablet (25 mg) by mouth daily    Essential hypertension       omeprazole 20 MG CR capsule    priLOSEC    30 capsule    Take 1 capsule (20 mg) by mouth daily    Epigastric pain       order for DME     1 Units    Blood pressure cuff machine    Elevated blood-pressure reading without diagnosis of hypertension       ranitidine 300 MG tablet    ZANTAC    30 tablet    Take 1 tablet (300 mg) by mouth At Bedtime    Gastroesophageal reflux disease without esophagitis

## 2018-08-06 ENCOUNTER — OFFICE VISIT (OUTPATIENT)
Dept: PEDIATRICS | Facility: CLINIC | Age: 68
End: 2018-08-06
Payer: COMMERCIAL

## 2018-08-06 ENCOUNTER — RADIANT APPOINTMENT (OUTPATIENT)
Dept: MAMMOGRAPHY | Facility: CLINIC | Age: 68
End: 2018-08-06
Attending: INTERNAL MEDICINE
Payer: COMMERCIAL

## 2018-08-06 VITALS
SYSTOLIC BLOOD PRESSURE: 122 MMHG | HEART RATE: 77 BPM | BODY MASS INDEX: 32.31 KG/M2 | TEMPERATURE: 98.3 F | WEIGHT: 171 LBS | OXYGEN SATURATION: 97 % | DIASTOLIC BLOOD PRESSURE: 76 MMHG

## 2018-08-06 DIAGNOSIS — K21.9 GASTROESOPHAGEAL REFLUX DISEASE WITHOUT ESOPHAGITIS: ICD-10-CM

## 2018-08-06 DIAGNOSIS — E78.5 HYPERLIPIDEMIA WITH TARGET LDL LESS THAN 130: ICD-10-CM

## 2018-08-06 DIAGNOSIS — Z12.31 ENCOUNTER FOR SCREENING MAMMOGRAM FOR BREAST CANCER: ICD-10-CM

## 2018-08-06 DIAGNOSIS — I10 ESSENTIAL HYPERTENSION: Primary | ICD-10-CM

## 2018-08-06 LAB — HBA1C MFR BLD: 5.9 % (ref 0–5.6)

## 2018-08-06 PROCEDURE — 80053 COMPREHEN METABOLIC PANEL: CPT | Performed by: INTERNAL MEDICINE

## 2018-08-06 PROCEDURE — 99214 OFFICE O/P EST MOD 30 MIN: CPT | Performed by: INTERNAL MEDICINE

## 2018-08-06 PROCEDURE — 80061 LIPID PANEL: CPT | Performed by: INTERNAL MEDICINE

## 2018-08-06 PROCEDURE — 77067 SCR MAMMO BI INCL CAD: CPT | Mod: TC

## 2018-08-06 PROCEDURE — 83036 HEMOGLOBIN GLYCOSYLATED A1C: CPT | Performed by: INTERNAL MEDICINE

## 2018-08-06 PROCEDURE — 36415 COLL VENOUS BLD VENIPUNCTURE: CPT | Performed by: INTERNAL MEDICINE

## 2018-08-06 RX ORDER — HYDROCHLOROTHIAZIDE 25 MG/1
25 TABLET ORAL DAILY
Qty: 90 TABLET | Refills: 3 | Status: SHIPPED | OUTPATIENT
Start: 2018-08-06 | End: 2020-07-16

## 2018-08-06 NOTE — LETTER
Controlling Cholesterol  What is cholesterol?   Cholesterol is a fatty substance. It has both good and bad effects on the body. Your body needs small amounts of cholesterol to make hormones and to build and maintain cells. However, when your body has too much cholesterol, deposits of fat called plaque form inside the walls of your blood vessels (arteries). The blood vessel walls thicken and the vessels become narrower. This is a condition called atherosclerosis. These changes make it harder for blood to flow through the blood vessels, increasing your risk of heart disease, heart attack, and stroke. The plaque can also easily break off and cause a blockage. When the artery is blocked, no blood can flow through it. This prevents the heart muscle from getting oxygen and can cause a heart attack. If a piece of plaque breaks off and flows to the brain, it can cause a stroke.   Most of the cholesterol in your blood is made by your liver from the fats, carbohydrates, and proteins you eat. You also get cholesterol by eating animal products such as meat, eggs, and high-fat dairy products such as whole milk, cream, and real butter.   It is important to find out what your cholesterol numbers are because lowering cholesterol levels that are too high lessens your risk for developing heart disease. It reduces the chance of a heart attack or death from heart disease, even if you already have heart disease.   How is cholesterol measured?   When you get your cholesterol checked, your healthcare provider will give you a number for your total cholesterol level. You can use the chart below to see if your total cholesterol is high.     Total Cholesterol Level (mg/dL)   ----------------------------------------   less than 200   good   200 to 239      borderline high   240 or above    high   ----------------------------------------      When your total cholesterol is measured and found to be high, your healthcare  "provider may also check the amount of LDL (low-density lipoprotein) and HDL (high-density lipoprotein) in your blood. LDL and HDL carry cholesterol through your blood. LDL carries a lot of cholesterol, leaves behind fatty deposits on your artery walls, and contributes to heart disease. HDL does the opposite. HDL cleans the artery walls and removes extra cholesterol from the body, thus lowering the risk of heart disease. LDL cholesterol is called bad cholesterol. (You can think of \"L\" for \"lousy\" cholesterol.) HDL cholesterol is called good cholesterol (think of \"H\" for \"healthy\" cholesterol). It is good to have low levels of LDL and high levels of HDL.   Because HDL cholesterol protects against heart disease, higher numbers are better. HDL levels of 60 mg/dL or more help to lower your risk for heart disease. A level equal to or less than 40 mg/dL is low and is considered a major risk factor because it increases your risk for developing heart disease   The level of LDL cholesterol that is healthy for you depends on your risk of heart disease and heart attack. In general, the higher your LDL level and the more risk factors you have for heart disease, the greater your chances of developing heart disease or having a heart attack. These are the recommended goals for LDL, according to risk level:   The goal is less than 160 mg/dL if your risk of heart disease is low.   The goal is less than 130 mg/dL if you have a moderate risk.   The goal is less than 100 mg/dL if you have a high risk of heart disease or you already have heart disease or diabetes.   For many people with heart disease, especially if they also have diabetes, the goal is less than 70 mg/dL.   Lowering cholesterol, especially the LDL, is connected or linked with:   Slowing, stopping, or even reversing the buildup of plaque   Reducing the chances of heart attack by making plaques more stable and less likely to break off or rupture.   This means the chance of " having a heart attack is much less.   In addition to high levels of total cholesterol and LDL, major risks for heart disease include:   diabetes   cigarette smoking   high blood pressure (140/90 mm Hg or higher or you are taking blood pressure medicine)   low HDL cholesterol (less than 40 mg/dL)   family history of early heart disease (father or brother diagnosed with heart disease before age 55, or mother or sister diagnosed before age 65)   age 45 or older for men and age 55 or older for women.   If you have diabetes, your risk of heart disease is high. If you do not have diabetes but you have 2 or more of the other risk factors in this list, your risk is moderate to high. Based on your personal and family history, your healthcare provider can help you calculate your risk level.   How can I control my cholesterol level?   High cholesterol may run in families. Know your family history and discuss it with your healthcare provider.   You can often control cholesterol levels by   eating right   exercising   not smoking   losing weight if you are overweight.   If you have a high risk for heart disease, your healthcare provider may prescribe cholesterol-lowering medicine as well as changes in lifestyle.   Eat right.  Follow these diet guidelines to help control your cholesterol:   Limit the cholesterol in your diet to less than 300 mg per day. If you have heart disease, limit cholesterol to less than 200 mg per day.   Be careful about the amounts and types of fat that you eat. Fats should contribute no more than 25 to 35% of your daily calories. Most of your dietary fat should be from polyunsaturated and monounsaturated fats, which are healthier than saturated fat and trans fats.   Saturated fat raises your blood cholesterol because it makes it hard for the body to clear the cholesterol away. Less than 7 to 10% of your calories should come from saturated fat. Saturated fat is found in different amounts in almost all  "foods. Butter, some oils, meat, and poultry fat contain a lot of saturated fat.   Trans fatty acids, often called trans fats, tend to raise your bad LDL cholesterol and lower your good HDL cholesterol. Trans fats naturally occur in some foods, mostly in meat and dairy products. But food makers can create trans fats when they are preparing food for grocery stores. This is usually done by adding hydrogen to fats. If the list of ingredients of a food product includes the words \"partially hydrogenated\" (usually referring to oils, such as soybean oil and others), the product is likely to contain trans fats. Try to eat as little trans fat as possible. As of January 2006, nutrition labels must list trans fats if the food contains them. Check the nutrition bar on the side of the package.   Polyunsaturated fats are found in fish and some vegetable oils. Monounsaturated fats are found in olive oil, canola oil, and avocados. Both types of these healthier fats are also found in many nuts and legumes.   Adjust the amount of calories you eat and exercise regularly, according to your healthcare provider's exercise prescription, to maintain your recommended body weight.   To control the cholesterol and types and amounts of fat you eat:   Check food labels for fat and cholesterol content. Choose the foods with less fat per serving.   Limit the amount of butter and margarine you eat.   Use olive, canola, sunflower, safflower, soybean, or corn oil. Avoid tropical oils such as palm or coconut oil. Also avoid oils that have been hydrogenated or partially hydrogenated.   Use salad dressings and margarine made with polyunsaturated and monounsaturated fats.   Use egg whites or egg substitutes rather than whole eggs.   Replace whole-milk dairy products with nonfat or low-fat milk, cheese, spreads, and yogurt.   Eat skinless chicken, turkey, fish, and meatless entrees more often than red meat.   Choose lean cuts of meat and trim off all " "visible fat. Keep portion sizes moderate.   Avoid fatty desserts such as ice cream, cream-filled cakes, and cheesecakes. Choose fresh fruits, nonfat frozen yogurt, Popsicles, etc.   Reduce the amount of fried foods, vending machine food, and fast food you eat.   Eat several daily servings of fruits and vegetables (especially fresh fruits and leafy vegetables), beans, and whole grains (such as whole wheat, bran, brown rice, oats, and oatmeal). The fiber in these foods helps lower cholesterol.   Eat 4 to 5 servings of nuts a week. Examples of nuts that can be a part of a healthy diet are walnuts, almonds, hazelnuts, peanuts, pecans, and pistachio nuts.   Look for low-fat or nonfat varieties of the foods you like to eat, or look for substitutes.   Exercise.  Exercise goes hand-in-hand with a healthy diet for controlling cholesterol. Exercise helps because it:   Keeps your weight down.   Decreases your total cholesterol level.   Decreases your LDL (bad cholesterol).   Increases your HDL (good cholesterol).   A good exercise program includes aerobic exercise. Aerobic exercise is any activity that keeps your heart rate up (such as swimming, jogging, walking, and bicycling). You should get at least 30 minutes of moderate aerobic exercise most days of the week. Moderate aerobic exercise is generally defined as requiring the energy it takes to walk 2 miles in 30 minutes. You may need to exercise 60 minutes a day to prevent weight gain and 90 minutes a day to lose weight.   If you haven't been exercising, ask your healthcare provider for an exercise prescription and start your new exercise program slowly.   Don't smoke.  Do not smoke. Smoking increases your risk of heart disease because it lowers HDL levels, increases your risk of blood clots, and decreases oxygen to the tissues.   Lose excess weight.  Extra weight increases your risk for heart and blood vessel disease. One way it does this is by causing your LDL (\"bad\") " "cholesterol to go up. Extra weight can also make you tired. It takes a lot of energy to carry all those pounds around. The result is that you are less active. This can mean that you don't get enough exercise and gain even more weight.   Losing excess weight:   Improves not only the bad LDL cholesterol but other blood fats as well.   Lowers your risk for heart attack or stroke.   Increases your energy and helps you feel better (both physically and mentally) and become more active.   Your weight is primarily the result of 2 factors. One is the number of calories you consume. The other is the number of calories you \"burn\". If you eat more calories than you use, your body will store the extra calories as fat and your weight will go up. If your body uses more calories than you eat, you will lose weight.   Here are some things you can do to lose weight.   Talk to your healthcare provider about your weight. Ask how a change in diet and exercise will change your cholesterol levels. Plan for gradual weight loss, just 1 or 2 pounds per week   Eat fewer calories.   Get more physical activity.   Keep a diary of your food and exercise for a couple of weeks to become more aware of your habits.   In summary, changes that you can make in your lifestyle to control your cholesterol level are:   Eat healthy.   Get regular exercise.   Don't smoke.   Keep a healthy weight.   Have your cholesterol levels checked as often as your provider recommends.   Cholesterol in the Diet  Cholesterol is a fatty substance in your body and in foods made from animals. There is a lot of it in meat, including beef, pork, chicken, and turkey. Whole-milk dairy products, egg yolks, and shellfish also have a lot of cholesterol.   Your body needs cholesterol to make hormones and build nerve cells. You don't have to get it from food because your body makes cholesterol. If you eat too many foods high in cholesterol or saturated fat you can get too much " cholesterol. It can cause high levels of cholesterol in the blood. High cholesterol increases your risk for heart disease.   How are saturated fat and trans fats related to cholesterol levels?   Like cholesterol, saturated fats are found mostly in animal products. Limiting the saturated fat in your diet is just as important as limiting cholesterol because your body makes more cholesterol when you eat saturated fat. Trans fats are another type of fat in animal products. Trans fats are also in many processed foods, such as cakes, cookies and potato chips. Like saturated fat, trans fats raise cholesterol levels in the blood.   How much cholesterol do animal products have?   As the table below shows, some foods have more cholesterol and saturated fat than others. They may be high in both, or low in both, or high in one but not the other. The healthiest diets include mostly foods that are low in cholesterol, saturated, and trans fat.   Most foods in the meat group have about the same amount of cholesterol per serving, regardless of the type or cut of meat. However, the amount of saturated fat in these various meats can be very different. High-fat cuts, such as prime rib and dark-meat poultry with the skin, contain a lot more saturated fat than lean cuts, such as pork tenderloin and chicken breast without skin.   Whole-milk dairy products, such as whole milk, cheese, ice cream, sour cream, and butter, have a lot of cholesterol and saturated fat. The good news is that food producers can remove both cholesterol and saturated fat from dairy foods. When dairy is skimmed of its fats, the cholesterol is skimmed off along with it. Skim (nonfat) dairy products are a healthy food choice.   Shellfish are low in saturated fat. Some shellfish are high in cholesterol, but the saturated fat is so low that these foods are still healthy. Fin fish, such as salmon, tuna, trout, and halibut, are relatively low in cholesterol and saturated  fat.   Cholesterol and Saturated Fat Content of Selected Foods     Food                                 Fat             Cholesterol                                       (grams)         (milligrams)   --------------------------------------------------------------------   8 ounces (oz) whole milk             4.5 g               25 mg   8 ounces skim milk                   0.36 g               5 mg   1 tablespoon butter                  7 g                 30 mg   4 tablespoon sour cream              5.5 g               24 mg   3 oz. pork tenderloin                2 g                 65 mg   3 oz. pork sausage                   7.5 g               70 mg   3 oz sirloin steak                   3 g                 76 mg   3 oz beef ribs                       5 g                 69 mg   3 oz chicken breast without skin     1 g                 73 mg   3 oz chicken thigh with skin         3.7 g               79 mg   3 oz shrimp                          0.25               166 mg   3 oz salmon                          1.5                 50 mg   1/2 cup vegetable shortening        25.5 g                0 mg    ---------------------------------------------------------------------      How much cholesterol can I have in my diet?   The guidelines for cholesterol in the diet depend on your medical condition. The recommendations are:   less than 200 mg a day if you have high cholesterol or heart disease   less than 300 mg of cholesterol a day if you do not have high cholesterol or heart disease.   Everyone should try to avoid saturated and trans fats.   Limiting cholesterol, saturated fat, and trans fat is easy if you get in the habit of cooking lean. Choose the leanest cuts of meats and dairy products, including more fish and less processed food. Some plant foods, such as palm oil, coconut oil, and cocoa butter do contain saturated fat, but it is not known if these fats have the same harmful effect on the heart as the saturated  fat in animal products. Plant foods, such as grains, fruits, vegetables, vegetable oils, nuts, and seeds, do not contain any cholesterol.     Published by [employer].  This content is reviewed periodically and is subject to change as new health information becomes available. The information is intended to inform and educate and is not a replacement for medical evaluation, advice, diagnosis or treatment by a healthcare professional.   Camryn Watts RD, CDE   ? 2010 [facility] and/or its affiliates. All Rights Reserved.

## 2018-08-06 NOTE — LETTER
[facility]  [address] [address]                  [phone]   August 16, 2018    [place]  [address]    [facility] [address]      Dear Marck,    Here is a summary of your recent test results:  Regarding the labs from your most recent visit:  Your cholesterol is high.    Based on your cholesterol results and risk factor profile, you would benefit from a cholesterol-lowering medication to reduce your cardiovascular risk. The 10 year risk of a cardiovascular event is 10%    The 10-year ASCVD risk score (Celsa COTY Jr, et al., 2013) is: 10.6%    Values used to calculate the score:      Age: 68 years      Sex: Female      Is Non- : Yes      Diabetic: No      Tobacco smoker: No      Systolic Blood Pressure: 122 mmHg      Is BP treated: No      HDL Cholesterol: 35 mg/dL      Total Cholesterol: 248 mg/dL     If you agree to starting cholesterol-lowering medication, please contact clinic at your convenience.        Your labwork is consistent with Prediabetes, defined as a fasting blood sugar between 100 and 125. Prediabetes puts you at risk for Adult Onset (type 2) Diabetes.  You can prevent or delay the development of Type 2 Diabetes through lifestyle changes including modest weight loss and regular exercise.  Reducing your weight through daily calorie restriction and modest physical activity for 30 minutes every day can often return elevated blood glucose levels to the normal range.    [facility] offers Pre-diabetes education classes. The classes focus on dietary choices, exercise and weight management.  The 2-hour class costs $30.  [place] by calling [facility] Scheduling at [phone].   Your blood chemistries, blood sugar, kidney and liver function tests are normal.             Please contact me if you have any questions.             Best regards,    Duke Zhu MD        Results for orders placed or performed in  visit on 08/06/18   Lipid panel reflex to direct LDL Fasting   Result Value Ref Range    Cholesterol 248 (H) <200 mg/dL    Triglycerides 155 (H) <150 mg/dL    HDL Cholesterol 35 (L) >49 mg/dL    LDL Cholesterol Calculated 182 (H) <100 mg/dL    Non HDL Cholesterol 213 (H) <130 mg/dL   Comprehensive metabolic panel   Result Value Ref Range    Sodium 139 133 - 144 mmol/L    Potassium 3.8 3.4 - 5.3 mmol/L    Chloride 101 94 - 109 mmol/L    Carbon Dioxide 33 (H) 20 - 32 mmol/L    Anion Gap 5 3 - 14 mmol/L    Glucose 95 70 - 99 mg/dL    Urea Nitrogen 11 7 - 30 mg/dL    Creatinine 0.61 0.52 - 1.04 mg/dL    GFR Estimate >90 >60 mL/min/1.7m2    GFR Estimate If Black >90 >60 mL/min/1.7m2    Calcium 9.1 8.5 - 10.1 mg/dL    Bilirubin Total 0.3 0.2 - 1.3 mg/dL    Albumin 3.9 3.4 - 5.0 g/dL    Protein Total 8.3 6.8 - 8.8 g/dL    Alkaline Phosphatase 100 40 - 150 U/L    ALT 28 0 - 50 U/L    AST 18 0 - 45 U/L   Hemoglobin A1c   Result Value Ref Range    Hemoglobin A1C 5.9 (H) 0 - 5.6 %

## 2018-08-06 NOTE — PROGRESS NOTES
SUBJECTIVE:                                                    Marck Hernandez is a 68 year old female who presents to clinic today for the following health issues:    Hyperlipidemia Follow-Up      Rate your low fat/cholesterol diet?: fair.   Vegetarian.  ?dietary fat    Taking statin?  No    Other lipid medications/supplements?:  None  Recent Labs   Lab Test 06/18/15   CHOL  274*   HDL  41*   LDL  186*   TRIG  236*       Hypertension Follow-up      Outpatient blood pressures are not being checked.    Low Salt Diet: no added salt    GERD.  Sx well controlled      Amount of exercise or physical activity: limited    Problems taking medications regularly: No    Medication side effects: none    Diet: as above      Patient Active Problem List   Diagnosis     Hyperlipidemia with target LDL less than 130     Essential hypertension     Gastroesophageal reflux disease without esophagitis     Past Surgical History:   Procedure Laterality Date     COLONOSCOPY N/A 6/8/2017    Procedure: COLONOSCOPY;  COLONOSCOPY;  Surgeon: Alpesh Clark MD;  Location:  GI     NO HISTORY OF SURGERY         Social History   Substance Use Topics     Smoking status: Never Smoker     Smokeless tobacco: Never Used     Alcohol use No     Family History   Problem Relation Age of Onset     Diabetes No family hx of      HEART DISEASE No family hx of      Thyroid Disease No family hx of          Current Outpatient Prescriptions   Medication Sig Dispense Refill     hydrochlorothiazide (HYDRODIURIL) 25 MG tablet Take 1 tablet (25 mg) by mouth daily 90 tablet 3     omeprazole (PRILOSEC) 20 MG CR capsule Take 1 capsule (20 mg) by mouth daily 30 capsule 6     ranitidine (ZANTAC) 300 MG tablet Take 1 tablet (300 mg) by mouth At Bedtime 30 tablet 1     order for DME Blood pressure cuff machine 1 Units 0     [DISCONTINUED] hydrochlorothiazide (HYDRODIURIL) 25 MG tablet Take 1 tablet (25 mg) by mouth daily 30 tablet 1       ROS: The following systems  have been completely reviewed and are negative except as noted in the HPI: CONSTITUTIONAL,CARDIOVASCULAR, PULMONARY    OBJECTIVE:                                                    /76 (Cuff Size: Adult Regular)  Pulse 77  Temp 98.3  F (36.8  C) (Oral)  Wt 171 lb (77.6 kg)  SpO2 97%  BMI 32.31 kg/m2 Body mass index is 32.31 kg/(m^2).  GENERAL:  alert,  no distress  NECK: no tenderness, no adenopathy  RESP: lungs clear to auscultation - no rales, no rhonchi, no wheezes  CV: regular rates and rhythm, normal S1 S2, no S3 or S4 and no murmur, no click or rub   MS: extremities- no edema       ASSESSMENT/PLAN:                                                        ICD-10-CM    1. Essential hypertension I10 hydrochlorothiazide (HYDRODIURIL) 25 MG tablet     Adequate control.  Continue current regimen without changes.      2. Hyperlipidemia with target LDL less than 130 E78.5 Lipid panel reflex to direct LDL Fasting     Comprehensive metabolic panel     Hemoglobin A1c       Discussed healthy diet changes/A handout with pertinent patient health education information is given.        Rpt FLP today     3. Gastroesophageal reflux disease without esophagitis K21.9 Adequate control.  Continue current regimen without changes.         Duke Zhu MD  [facility]

## 2018-08-06 NOTE — PATIENT INSTRUCTIONS
INSTRUCTIONS FOR TODAY:     your blood pressure is well controlled--please continue the current medication (hydrochlorothiazide)     Dr Zhu                       Controlling Cholesterol  What is cholesterol?   Cholesterol is a fatty substance. It has both good and bad effects on the body. Your body needs small amounts of cholesterol to make hormones and to build and maintain cells. However, when your body has too much cholesterol, deposits of fat called plaque form inside the walls of your blood vessels (arteries). The blood vessel walls thicken and the vessels become narrower. This is a condition called atherosclerosis. These changes make it harder for blood to flow through the blood vessels, increasing your risk of heart disease, heart attack, and stroke. The plaque can also easily break off and cause a blockage. When the artery is blocked, no blood can flow through it. This prevents the heart muscle from getting oxygen and can cause a heart attack. If a piece of plaque breaks off and flows to the brain, it can cause a stroke.   Most of the cholesterol in your blood is made by your liver from the fats, carbohydrates, and proteins you eat. You also get cholesterol by eating animal products such as meat, eggs, and high-fat dairy products such as whole milk, cream, and real butter.   It is important to find out what your cholesterol numbers are because lowering cholesterol levels that are too high lessens your risk for developing heart disease. It reduces the chance of a heart attack or death from heart disease, even if you already have heart disease.   How is cholesterol measured?   When you get your cholesterol checked, your healthcare provider will give you a number for your total cholesterol level. You can use the chart below to see if your total cholesterol is high.     Total Cholesterol Level (mg/dL)   ----------------------------------------   less than 200   good   200 to 239      borderline high   240 or  "above    high   ----------------------------------------      When your total cholesterol is measured and found to be high, your healthcare provider may also check the amount of LDL (low-density lipoprotein) and HDL (high-density lipoprotein) in your blood. LDL and HDL carry cholesterol through your blood. LDL carries a lot of cholesterol, leaves behind fatty deposits on your artery walls, and contributes to heart disease. HDL does the opposite. HDL cleans the artery walls and removes extra cholesterol from the body, thus lowering the risk of heart disease. LDL cholesterol is called bad cholesterol. (You can think of \"L\" for \"lousy\" cholesterol.) HDL cholesterol is called good cholesterol (think of \"H\" for \"healthy\" cholesterol). It is good to have low levels of LDL and high levels of HDL.   Because HDL cholesterol protects against heart disease, higher numbers are better. HDL levels of 60 mg/dL or more help to lower your risk for heart disease. A level equal to or less than 40 mg/dL is low and is considered a major risk factor because it increases your risk for developing heart disease   The level of LDL cholesterol that is healthy for you depends on your risk of heart disease and heart attack. In general, the higher your LDL level and the more risk factors you have for heart disease, the greater your chances of developing heart disease or having a heart attack. These are the recommended goals for LDL, according to risk level:   The goal is less than 160 mg/dL if your risk of heart disease is low.   The goal is less than 130 mg/dL if you have a moderate risk.   The goal is less than 100 mg/dL if you have a high risk of heart disease or you already have heart disease or diabetes.   For many people with heart disease, especially if they also have diabetes, the goal is less than 70 mg/dL.   Lowering cholesterol, especially the LDL, is connected or linked with:   Slowing, stopping, or even reversing the buildup of " plaque   Reducing the chances of heart attack by making plaques more stable and less likely to break off or rupture.   This means the chance of having a heart attack is much less.   In addition to high levels of total cholesterol and LDL, major risks for heart disease include:   diabetes   cigarette smoking   high blood pressure (140/90 mm Hg or higher or you are taking blood pressure medicine)   low HDL cholesterol (less than 40 mg/dL)   family history of early heart disease (father or brother diagnosed with heart disease before age 55, or mother or sister diagnosed before age 65)   age 45 or older for men and age 55 or older for women.   If you have diabetes, your risk of heart disease is high. If you do not have diabetes but you have 2 or more of the other risk factors in this list, your risk is moderate to high. Based on your personal and family history, your healthcare provider can help you calculate your risk level.   How can I control my cholesterol level?   High cholesterol may run in families. Know your family history and discuss it with your healthcare provider.   You can often control cholesterol levels by   eating right   exercising   not smoking   losing weight if you are overweight.   If you have a high risk for heart disease, your healthcare provider may prescribe cholesterol-lowering medicine as well as changes in lifestyle.   Eat right.  Follow these diet guidelines to help control your cholesterol:   Limit the cholesterol in your diet to less than 300 mg per day. If you have heart disease, limit cholesterol to less than 200 mg per day.   Be careful about the amounts and types of fat that you eat. Fats should contribute no more than 25 to 35% of your daily calories. Most of your dietary fat should be from polyunsaturated and monounsaturated fats, which are healthier than saturated fat and trans fats.   Saturated fat raises your blood cholesterol because it makes it hard for the body to clear the  "cholesterol away. Less than 7 to 10% of your calories should come from saturated fat. Saturated fat is found in different amounts in almost all foods. Butter, some oils, meat, and poultry fat contain a lot of saturated fat.   Trans fatty acids, often called trans fats, tend to raise your bad LDL cholesterol and lower your good HDL cholesterol. Trans fats naturally occur in some foods, mostly in meat and dairy products. But food makers can create trans fats when they are preparing food for grocery stores. This is usually done by adding hydrogen to fats. If the list of ingredients of a food product includes the words \"partially hydrogenated\" (usually referring to oils, such as soybean oil and others), the product is likely to contain trans fats. Try to eat as little trans fat as possible. As of January 2006, nutrition labels must list trans fats if the food contains them. Check the nutrition bar on the side of the package.   Polyunsaturated fats are found in fish and some vegetable oils. Monounsaturated fats are found in olive oil, canola oil, and avocados. Both types of these healthier fats are also found in many nuts and legumes.   Adjust the amount of calories you eat and exercise regularly, according to your healthcare provider's exercise prescription, to maintain your recommended body weight.   To control the cholesterol and types and amounts of fat you eat:   Check food labels for fat and cholesterol content. Choose the foods with less fat per serving.   Limit the amount of butter and margarine you eat.   Use olive, canola, sunflower, safflower, soybean, or corn oil. Avoid tropical oils such as palm or coconut oil. Also avoid oils that have been hydrogenated or partially hydrogenated.   Use salad dressings and margarine made with polyunsaturated and monounsaturated fats.   Use egg whites or egg substitutes rather than whole eggs.   Replace whole-milk dairy products with nonfat or low-fat milk, cheese, spreads, " and yogurt.   Eat skinless chicken, turkey, fish, and meatless entrees more often than red meat.   Choose lean cuts of meat and trim off all visible fat. Keep portion sizes moderate.   Avoid fatty desserts such as ice cream, cream-filled cakes, and cheesecakes. Choose fresh fruits, nonfat frozen yogurt, Popsicles, etc.   Reduce the amount of fried foods, vending machine food, and fast food you eat.   Eat several daily servings of fruits and vegetables (especially fresh fruits and leafy vegetables), beans, and whole grains (such as whole wheat, bran, brown rice, oats, and oatmeal). The fiber in these foods helps lower cholesterol.   Eat 4 to 5 servings of nuts a week. Examples of nuts that can be a part of a healthy diet are walnuts, almonds, hazelnuts, peanuts, pecans, and pistachio nuts.   Look for low-fat or nonfat varieties of the foods you like to eat, or look for substitutes.   Exercise.  Exercise goes hand-in-hand with a healthy diet for controlling cholesterol. Exercise helps because it:   Keeps your weight down.   Decreases your total cholesterol level.   Decreases your LDL (bad cholesterol).   Increases your HDL (good cholesterol).   A good exercise program includes aerobic exercise. Aerobic exercise is any activity that keeps your heart rate up (such as swimming, jogging, walking, and bicycling). You should get at least 30 minutes of moderate aerobic exercise most days of the week. Moderate aerobic exercise is generally defined as requiring the energy it takes to walk 2 miles in 30 minutes. You may need to exercise 60 minutes a day to prevent weight gain and 90 minutes a day to lose weight.   If you haven't been exercising, ask your healthcare provider for an exercise prescription and start your new exercise program slowly.   Don't smoke.  Do not smoke. Smoking increases your risk of heart disease because it lowers HDL levels, increases your risk of blood clots, and decreases oxygen to the tissues.   Lose  "excess weight.  Extra weight increases your risk for heart and blood vessel disease. One way it does this is by causing your LDL (\"bad\") cholesterol to go up. Extra weight can also make you tired. It takes a lot of energy to carry all those pounds around. The result is that you are less active. This can mean that you don't get enough exercise and gain even more weight.   Losing excess weight:   Improves not only the bad LDL cholesterol but other blood fats as well.   Lowers your risk for heart attack or stroke.   Increases your energy and helps you feel better (both physically and mentally) and become more active.   Your weight is primarily the result of 2 factors. One is the number of calories you consume. The other is the number of calories you \"burn\". If you eat more calories than you use, your body will store the extra calories as fat and your weight will go up. If your body uses more calories than you eat, you will lose weight.   Here are some things you can do to lose weight.   Talk to your healthcare provider about your weight. Ask how a change in diet and exercise will change your cholesterol levels. Plan for gradual weight loss, just 1 or 2 pounds per week   Eat fewer calories.   Get more physical activity.   Keep a diary of your food and exercise for a couple of weeks to become more aware of your habits.   In summary, changes that you can make in your lifestyle to control your cholesterol level are:   Eat healthy.   Get regular exercise.   Don't smoke.   Keep a healthy weight.   Have your cholesterol levels checked as often as your provider recommends.   Cholesterol in the Diet  Cholesterol is a fatty substance in your body and in foods made from animals. There is a lot of it in meat, including beef, pork, chicken, and turkey. Whole-milk dairy products, egg yolks, and shellfish also have a lot of cholesterol.   Your body needs cholesterol to make hormones and build nerve cells. You don't have to get it " from food because your body makes cholesterol. If you eat too many foods high in cholesterol or saturated fat you can get too much cholesterol. It can cause high levels of cholesterol in the blood. High cholesterol increases your risk for heart disease.   How are saturated fat and trans fats related to cholesterol levels?   Like cholesterol, saturated fats are found mostly in animal products. Limiting the saturated fat in your diet is just as important as limiting cholesterol because your body makes more cholesterol when you eat saturated fat. Trans fats are another type of fat in animal products. Trans fats are also in many processed foods, such as cakes, cookies and potato chips. Like saturated fat, trans fats raise cholesterol levels in the blood.   How much cholesterol do animal products have?   As the table below shows, some foods have more cholesterol and saturated fat than others. They may be high in both, or low in both, or high in one but not the other. The healthiest diets include mostly foods that are low in cholesterol, saturated, and trans fat.   Most foods in the meat group have about the same amount of cholesterol per serving, regardless of the type or cut of meat. However, the amount of saturated fat in these various meats can be very different. High-fat cuts, such as prime rib and dark-meat poultry with the skin, contain a lot more saturated fat than lean cuts, such as pork tenderloin and chicken breast without skin.   Whole-milk dairy products, such as whole milk, cheese, ice cream, sour cream, and butter, have a lot of cholesterol and saturated fat. The good news is that food producers can remove both cholesterol and saturated fat from dairy foods. When dairy is skimmed of its fats, the cholesterol is skimmed off along with it. Skim (nonfat) dairy products are a healthy food choice.   Shellfish are low in saturated fat. Some shellfish are high in cholesterol, but the saturated fat is so low that  these foods are still healthy. Fin fish, such as salmon, tuna, trout, and halibut, are relatively low in cholesterol and saturated fat.   Cholesterol and Saturated Fat Content of Selected Foods     Food                                 Fat             Cholesterol                                       (grams)         (milligrams)   --------------------------------------------------------------------   8 ounces (oz) whole milk             4.5 g               25 mg   8 ounces skim milk                   0.36 g               5 mg   1 tablespoon butter                  7 g                 30 mg   4 tablespoon sour cream              5.5 g               24 mg   3 oz. pork tenderloin                2 g                 65 mg   3 oz. pork sausage                   7.5 g               70 mg   3 oz sirloin steak                   3 g                 76 mg   3 oz beef ribs                       5 g                 69 mg   3 oz chicken breast without skin     1 g                 73 mg   3 oz chicken thigh with skin         3.7 g               79 mg   3 oz shrimp                          0.25               166 mg   3 oz salmon                          1.5                 50 mg   1/2 cup vegetable shortening        25.5 g                0 mg    ---------------------------------------------------------------------      How much cholesterol can I have in my diet?   The guidelines for cholesterol in the diet depend on your medical condition. The recommendations are:   less than 200 mg a day if you have high cholesterol or heart disease   less than 300 mg of cholesterol a day if you do not have high cholesterol or heart disease.   Everyone should try to avoid saturated and trans fats.   Limiting cholesterol, saturated fat, and trans fat is easy if you get in the habit of cooking lean. Choose the leanest cuts of meats and dairy products, including more fish and less processed food. Some plant foods, such as palm oil, coconut oil, and  cocoa butter do contain saturated fat, but it is not known if these fats have the same harmful effect on the heart as the saturated fat in animal products. Plant foods, such as grains, fruits, vegetables, vegetable oils, nuts, and seeds, do not contain any cholesterol.     Published by [employer]  This content is reviewed periodically and is subject to change as new health information becomes available. The information is intended to inform and educate and is not a replacement for medical evaluation, advice, diagnosis or treatment by a healthcare professional.   Camryn Watts RD, CDE   ? 2010 [facility] and/or its affiliates. All Rights Reserved.

## 2018-08-06 NOTE — MR AVS SNAPSHOT
After Visit Summary   8/6/2018    Marck Hernandez    MRN: [id number]           Patient Information     Date Of Birth          1950        Visit Information        Provider Department      8/6/2018 12:45 PM Duke Zhu MD; LANGUAGE [facility]        Today's Diagnoses     Essential hypertension    -  1    Hyperlipidemia with target LDL less than 130          Care Instructions    INSTRUCTIONS FOR TODAY:     your blood pressure is well controlled--please continue the current medication (hydrochlorothiazide)     Dr Zhu                       Controlling Cholesterol  What is cholesterol?   Cholesterol is a fatty substance. It has both good and bad effects on the body. Your body needs small amounts of cholesterol to make hormones and to build and maintain cells. However, when your body has too much cholesterol, deposits of fat called plaque form inside the walls of your blood vessels (arteries). The blood vessel walls thicken and the vessels become narrower. This is a condition called atherosclerosis. These changes make it harder for blood to flow through the blood vessels, increasing your risk of heart disease, heart attack, and stroke. The plaque can also easily break off and cause a blockage. When the artery is blocked, no blood can flow through it. This prevents the heart muscle from getting oxygen and can cause a heart attack. If a piece of plaque breaks off and flows to the brain, it can cause a stroke.   Most of the cholesterol in your blood is made by your liver from the fats, carbohydrates, and proteins you eat. You also get cholesterol by eating animal products such as meat, eggs, and high-fat dairy products such as whole milk, cream, and real butter.   It is important to find out what your cholesterol numbers are because lowering cholesterol levels that are too high lessens your risk for developing heart disease. It reduces the chance of a heart attack or death from  "heart disease, even if you already have heart disease.   How is cholesterol measured?   When you get your cholesterol checked, your healthcare provider will give you a number for your total cholesterol level. You can use the chart below to see if your total cholesterol is high.     Total Cholesterol Level (mg/dL)   ----------------------------------------   less than 200   good   200 to 239      borderline high   240 or above    high   ----------------------------------------      When your total cholesterol is measured and found to be high, your healthcare provider may also check the amount of LDL (low-density lipoprotein) and HDL (high-density lipoprotein) in your blood. LDL and HDL carry cholesterol through your blood. LDL carries a lot of cholesterol, leaves behind fatty deposits on your artery walls, and contributes to heart disease. HDL does the opposite. HDL cleans the artery walls and removes extra cholesterol from the body, thus lowering the risk of heart disease. LDL cholesterol is called bad cholesterol. (You can think of \"L\" for \"lousy\" cholesterol.) HDL cholesterol is called good cholesterol (think of \"H\" for \"healthy\" cholesterol). It is good to have low levels of LDL and high levels of HDL.   Because HDL cholesterol protects against heart disease, higher numbers are better. HDL levels of 60 mg/dL or more help to lower your risk for heart disease. A level equal to or less than 40 mg/dL is low and is considered a major risk factor because it increases your risk for developing heart disease   The level of LDL cholesterol that is healthy for you depends on your risk of heart disease and heart attack. In general, the higher your LDL level and the more risk factors you have for heart disease, the greater your chances of developing heart disease or having a heart attack. These are the recommended goals for LDL, according to risk level:   The goal is less than 160 mg/dL if your risk of heart disease is low. "   The goal is less than 130 mg/dL if you have a moderate risk.   The goal is less than 100 mg/dL if you have a high risk of heart disease or you already have heart disease or diabetes.   For many people with heart disease, especially if they also have diabetes, the goal is less than 70 mg/dL.   Lowering cholesterol, especially the LDL, is connected or linked with:   Slowing, stopping, or even reversing the buildup of plaque   Reducing the chances of heart attack by making plaques more stable and less likely to break off or rupture.   This means the chance of having a heart attack is much less.   In addition to high levels of total cholesterol and LDL, major risks for heart disease include:   diabetes   cigarette smoking   high blood pressure (140/90 mm Hg or higher or you are taking blood pressure medicine)   low HDL cholesterol (less than 40 mg/dL)   family history of early heart disease (father or brother diagnosed with heart disease before age 55, or mother or sister diagnosed before age 65)   age 45 or older for men and age 55 or older for women.   If you have diabetes, your risk of heart disease is high. If you do not have diabetes but you have 2 or more of the other risk factors in this list, your risk is moderate to high. Based on your personal and family history, your healthcare provider can help you calculate your risk level.   How can I control my cholesterol level?   High cholesterol may run in families. Know your family history and discuss it with your healthcare provider.   You can often control cholesterol levels by   eating right   exercising   not smoking   losing weight if you are overweight.   If you have a high risk for heart disease, your healthcare provider may prescribe cholesterol-lowering medicine as well as changes in lifestyle.   Eat right.  Follow these diet guidelines to help control your cholesterol:   Limit the cholesterol in your diet to less than 300 mg per day. If you have heart  "disease, limit cholesterol to less than 200 mg per day.   Be careful about the amounts and types of fat that you eat. Fats should contribute no more than 25 to 35% of your daily calories. Most of your dietary fat should be from polyunsaturated and monounsaturated fats, which are healthier than saturated fat and trans fats.   Saturated fat raises your blood cholesterol because it makes it hard for the body to clear the cholesterol away. Less than 7 to 10% of your calories should come from saturated fat. Saturated fat is found in different amounts in almost all foods. Butter, some oils, meat, and poultry fat contain a lot of saturated fat.   Trans fatty acids, often called trans fats, tend to raise your bad LDL cholesterol and lower your good HDL cholesterol. Trans fats naturally occur in some foods, mostly in meat and dairy products. But food makers can create trans fats when they are preparing food for grocery stores. This is usually done by adding hydrogen to fats. If the list of ingredients of a food product includes the words \"partially hydrogenated\" (usually referring to oils, such as soybean oil and others), the product is likely to contain trans fats. Try to eat as little trans fat as possible. As of January 2006, nutrition labels must list trans fats if the food contains them. Check the nutrition bar on the side of the package.   Polyunsaturated fats are found in fish and some vegetable oils. Monounsaturated fats are found in olive oil, canola oil, and avocados. Both types of these healthier fats are also found in many nuts and legumes.   Adjust the amount of calories you eat and exercise regularly, according to your healthcare provider's exercise prescription, to maintain your recommended body weight.   To control the cholesterol and types and amounts of fat you eat:   Check food labels for fat and cholesterol content. Choose the foods with less fat per serving.   Limit the amount of butter and margarine you " eat.   Use olive, canola, sunflower, safflower, soybean, or corn oil. Avoid tropical oils such as palm or coconut oil. Also avoid oils that have been hydrogenated or partially hydrogenated.   Use salad dressings and margarine made with polyunsaturated and monounsaturated fats.   Use egg whites or egg substitutes rather than whole eggs.   Replace whole-milk dairy products with nonfat or low-fat milk, cheese, spreads, and yogurt.   Eat skinless chicken, turkey, fish, and meatless entrees more often than red meat.   Choose lean cuts of meat and trim off all visible fat. Keep portion sizes moderate.   Avoid fatty desserts such as ice cream, cream-filled cakes, and cheesecakes. Choose fresh fruits, nonfat frozen yogurt, Popsicles, etc.   Reduce the amount of fried foods, vending machine food, and fast food you eat.   Eat several daily servings of fruits and vegetables (especially fresh fruits and leafy vegetables), beans, and whole grains (such as whole wheat, bran, brown rice, oats, and oatmeal). The fiber in these foods helps lower cholesterol.   Eat 4 to 5 servings of nuts a week. Examples of nuts that can be a part of a healthy diet are walnuts, almonds, hazelnuts, peanuts, pecans, and pistachio nuts.   Look for low-fat or nonfat varieties of the foods you like to eat, or look for substitutes.   Exercise.  Exercise goes hand-in-hand with a healthy diet for controlling cholesterol. Exercise helps because it:   Keeps your weight down.   Decreases your total cholesterol level.   Decreases your LDL (bad cholesterol).   Increases your HDL (good cholesterol).   A good exercise program includes aerobic exercise. Aerobic exercise is any activity that keeps your heart rate up (such as swimming, jogging, walking, and bicycling). You should get at least 30 minutes of moderate aerobic exercise most days of the week. Moderate aerobic exercise is generally defined as requiring the energy it takes to walk 2 miles in 30 minutes. You  "may need to exercise 60 minutes a day to prevent weight gain and 90 minutes a day to lose weight.   If you haven't been exercising, ask your healthcare provider for an exercise prescription and start your new exercise program slowly.   Don't smoke.  Do not smoke. Smoking increases your risk of heart disease because it lowers HDL levels, increases your risk of blood clots, and decreases oxygen to the tissues.   Lose excess weight.  Extra weight increases your risk for heart and blood vessel disease. One way it does this is by causing your LDL (\"bad\") cholesterol to go up. Extra weight can also make you tired. It takes a lot of energy to carry all those pounds around. The result is that you are less active. This can mean that you don't get enough exercise and gain even more weight.   Losing excess weight:   Improves not only the bad LDL cholesterol but other blood fats as well.   Lowers your risk for heart attack or stroke.   Increases your energy and helps you feel better (both physically and mentally) and become more active.   Your weight is primarily the result of 2 factors. One is the number of calories you consume. The other is the number of calories you \"burn\". If you eat more calories than you use, your body will store the extra calories as fat and your weight will go up. If your body uses more calories than you eat, you will lose weight.   Here are some things you can do to lose weight.   Talk to your healthcare provider about your weight. Ask how a change in diet and exercise will change your cholesterol levels. Plan for gradual weight loss, just 1 or 2 pounds per week   Eat fewer calories.   Get more physical activity.   Keep a diary of your food and exercise for a couple of weeks to become more aware of your habits.   In summary, changes that you can make in your lifestyle to control your cholesterol level are:   Eat healthy.   Get regular exercise.   Don't smoke.   Keep a healthy weight.   Have your " cholesterol levels checked as often as your provider recommends.   Cholesterol in the Diet  Cholesterol is a fatty substance in your body and in foods made from animals. There is a lot of it in meat, including beef, pork, chicken, and turkey. Whole-milk dairy products, egg yolks, and shellfish also have a lot of cholesterol.   Your body needs cholesterol to make hormones and build nerve cells. You don't have to get it from food because your body makes cholesterol. If you eat too many foods high in cholesterol or saturated fat you can get too much cholesterol. It can cause high levels of cholesterol in the blood. High cholesterol increases your risk for heart disease.   How are saturated fat and trans fats related to cholesterol levels?   Like cholesterol, saturated fats are found mostly in animal products. Limiting the saturated fat in your diet is just as important as limiting cholesterol because your body makes more cholesterol when you eat saturated fat. Trans fats are another type of fat in animal products. Trans fats are also in many processed foods, such as cakes, cookies and potato chips. Like saturated fat, trans fats raise cholesterol levels in the blood.   How much cholesterol do animal products have?   As the table below shows, some foods have more cholesterol and saturated fat than others. They may be high in both, or low in both, or high in one but not the other. The healthiest diets include mostly foods that are low in cholesterol, saturated, and trans fat.   Most foods in the meat group have about the same amount of cholesterol per serving, regardless of the type or cut of meat. However, the amount of saturated fat in these various meats can be very different. High-fat cuts, such as prime rib and dark-meat poultry with the skin, contain a lot more saturated fat than lean cuts, such as pork tenderloin and chicken breast without skin.   Whole-milk dairy products, such as whole milk, cheese, ice cream,  sour cream, and butter, have a lot of cholesterol and saturated fat. The good news is that food producers can remove both cholesterol and saturated fat from dairy foods. When dairy is skimmed of its fats, the cholesterol is skimmed off along with it. Skim (nonfat) dairy products are a healthy food choice.   Shellfish are low in saturated fat. Some shellfish are high in cholesterol, but the saturated fat is so low that these foods are still healthy. Fin fish, such as salmon, tuna, trout, and halibut, are relatively low in cholesterol and saturated fat.   Cholesterol and Saturated Fat Content of Selected Foods     Food                                 Fat             Cholesterol                                       (grams)         (milligrams)   --------------------------------------------------------------------   8 ounces (oz) whole milk             4.5 g               25 mg   8 ounces skim milk                   0.36 g               5 mg   1 tablespoon butter                  7 g                 30 mg   4 tablespoon sour cream              5.5 g               24 mg   3 oz. pork tenderloin                2 g                 65 mg   3 oz. pork sausage                   7.5 g               70 mg   3 oz sirloin steak                   3 g                 76 mg   3 oz beef ribs                       5 g                 69 mg   3 oz chicken breast without skin     1 g                 73 mg   3 oz chicken thigh with skin         3.7 g               79 mg   3 oz shrimp                          0.25               166 mg   3 oz salmon                          1.5                 50 mg   1/2 cup vegetable shortening        25.5 g                0 mg    ---------------------------------------------------------------------      How much cholesterol can I have in my diet?   The guidelines for cholesterol in the diet depend on your medical condition. The recommendations are:   less than 200 mg a day if you have high cholesterol  or heart disease   less than 300 mg of cholesterol a day if you do not have high cholesterol or heart disease.   Everyone should try to avoid saturated and trans fats.   Limiting cholesterol, saturated fat, and trans fat is easy if you get in the habit of cooking lean. Choose the leanest cuts of meats and dairy products, including more fish and less processed food. Some plant foods, such as palm oil, coconut oil, and cocoa butter do contain saturated fat, but it is not known if these fats have the same harmful effect on the heart as the saturated fat in animal products. Plant foods, such as grains, fruits, vegetables, vegetable oils, nuts, and seeds, do not contain any cholesterol.     Published by [employer].  This content is reviewed periodically and is subject to change as new health information becomes available. The information is intended to inform and educate and is not a replacement for medical evaluation, advice, diagnosis or treatment by a healthcare professional.   Camryn Watts RD, CDE   ? 2010 [facility] and/or its affiliates. All Rights Reserved.                      Follow-ups after your visit        Who to contact     If you have questions or need follow up information about today's clinic visit or your schedule please contact [facility] directly at [phone].  Normal or non-critical lab and imaging results will be communicated to you by MyChart, letter or phone within 4 business days after the clinic has received the results. If you do not hear from us within 7 days, please contact the clinic through MyChart or phone. If you have a critical or abnormal lab result, we will notify you by phone as soon as possible.  Submit refill requests through [employer] or call your pharmacy and they will forward the refill request to us. Please allow 3 business days for your refill to be completed.          Additional Information About Your Visit        Care EveryWhere ID     This is your Care  EveryWhere ID. This could be used by other organizations to access your [facility] medical records  [id number]        Your Vitals Were     Pulse Temperature Pulse Oximetry BMI (Body Mass Index)          77 98.3  F (36.8  C) (Oral) 97% 32.31 kg/m2         Blood Pressure from Last 3 Encounters:   08/06/18 122/76   07/11/18 150/80   06/15/17 110/80    Weight from Last 3 Encounters:   08/06/18 171 lb (77.6 kg)   07/11/18 172 lb (78 kg)   06/15/17 165 lb (74.8 kg)              We Performed the Following     Comprehensive metabolic panel     Hemoglobin A1c     Lipid panel reflex to direct LDL Fasting          Where to get your medicines      These medications were sent to [facility] [address]   [address], [place] [address]     Phone:  [phone]     hydrochlorothiazide 25 MG tablet          Primary Care Provider Office Phone # Fax #    Duke Zhu [phone]       [facility] [place] [address]        Equal Access to Services     [facility]: Hadii aad ku hadasho Soomaali, waaxda luqadaha, qaybta kaalmada adeegyada, eduardo heck. So [facility] [phone].    ATENCIÓN: Si habla español, tiene a law disposición servicios gratuitos de asistencia lingüística. Llame al [phone].    We comply with applicable federal civil rights laws and [place] laws. We do not discriminate on the basis of race, color, national origin, age, disability, sex, sexual orientation, or gender identity.            Thank you!     Thank you for choosing [facility]  for your care. Our goal is always to provide you with excellent care. Hearing back from our patients is one way we can continue to improve our services. Please take a few minutes to complete the written survey that you may receive in the mail after your visit with us. Thank you!             Your Updated Medication List - Protect others  around you: Learn how to safely use, store and throw away your medicines at www.disposemymeds.org.          This list is accurate as of 8/6/18  1:24 PM.  Always use your most recent med list.                   Brand Name Dispense Instructions for use Diagnosis    hydrochlorothiazide 25 MG tablet    HYDRODIURIL    90 tablet    Take 1 tablet (25 mg) by mouth daily    Essential hypertension       omeprazole 20 MG CR capsule    priLOSEC    30 capsule    Take 1 capsule (20 mg) by mouth daily    Epigastric pain       order for DME     1 Units    Blood pressure cuff machine    Elevated blood-pressure reading without diagnosis of hypertension       ranitidine 300 MG tablet    ZANTAC    30 tablet    Take 1 tablet (300 mg) by mouth At Bedtime    Gastroesophageal reflux disease without esophagitis

## 2018-08-07 LAB
ALBUMIN SERPL-MCNC: 3.9 G/DL (ref 3.4–5)
ALP SERPL-CCNC: 100 U/L (ref 40–150)
ALT SERPL W P-5'-P-CCNC: 28 U/L (ref 0–50)
ANION GAP SERPL CALCULATED.3IONS-SCNC: 5 MMOL/L (ref 3–14)
AST SERPL W P-5'-P-CCNC: 18 U/L (ref 0–45)
BILIRUB SERPL-MCNC: 0.3 MG/DL (ref 0.2–1.3)
BUN SERPL-MCNC: 11 MG/DL (ref 7–30)
CALCIUM SERPL-MCNC: 9.1 MG/DL (ref 8.5–10.1)
CHLORIDE SERPL-SCNC: 101 MMOL/L (ref 94–109)
CHOLEST SERPL-MCNC: 248 MG/DL
CO2 SERPL-SCNC: 33 MMOL/L (ref 20–32)
CREAT SERPL-MCNC: 0.61 MG/DL (ref 0.52–1.04)
GFR SERPL CREATININE-BSD FRML MDRD: >90 ML/MIN/1.7M2
GLUCOSE SERPL-MCNC: 95 MG/DL (ref 70–99)
HDLC SERPL-MCNC: 35 MG/DL
LDLC SERPL CALC-MCNC: 182 MG/DL
NONHDLC SERPL-MCNC: 213 MG/DL
POTASSIUM SERPL-SCNC: 3.8 MMOL/L (ref 3.4–5.3)
PROT SERPL-MCNC: 8.3 G/DL (ref 6.8–8.8)
SODIUM SERPL-SCNC: 139 MMOL/L (ref 133–144)
TRIGL SERPL-MCNC: 155 MG/DL

## 2018-08-16 PROBLEM — R73.03 PREDIABETES: Status: ACTIVE | Noted: 2018-08-16

## 2019-03-19 ENCOUNTER — OFFICE VISIT (OUTPATIENT)
Dept: PEDIATRICS | Facility: CLINIC | Age: 69
End: 2019-03-19
Payer: COMMERCIAL

## 2019-03-19 VITALS
BODY MASS INDEX: 32.91 KG/M2 | WEIGHT: 174.2 LBS | TEMPERATURE: 97.5 F | OXYGEN SATURATION: 98 % | SYSTOLIC BLOOD PRESSURE: 134 MMHG | HEART RATE: 74 BPM | DIASTOLIC BLOOD PRESSURE: 70 MMHG

## 2019-03-19 DIAGNOSIS — M25.551 HIP PAIN, RIGHT: Primary | ICD-10-CM

## 2019-03-19 PROCEDURE — 99213 OFFICE O/P EST LOW 20 MIN: CPT | Performed by: FAMILY MEDICINE

## 2019-03-19 RX ORDER — METHOCARBAMOL 750 MG/1
750 TABLET, FILM COATED ORAL 3 TIMES DAILY
Qty: 30 TABLET | Refills: 1 | Status: SHIPPED | OUTPATIENT
Start: 2019-03-19 | End: 2020-07-16

## 2019-03-19 NOTE — PROGRESS NOTES
CHIEF COMPLAINT    Pain R hip      HISTORY    She c/o pain for 1 week. Location R lateral buttock, R lateral thigh. Increases with change of position. She doesn't c/o LBP.    No falls or other injuries.    She has basically stayed inside all winter.      Patient Active Problem List   Diagnosis     Hyperlipidemia with target LDL less than 130     Essential hypertension     Gastroesophageal reflux disease without esophagitis     Prediabetes       Current Outpatient Medications   Medication Sig Dispense Refill     hydrochlorothiazide (HYDRODIURIL) 25 MG tablet Take 1 tablet (25 mg) by mouth daily 90 tablet 3     methocarbamol (ROBAXIN) 750 MG tablet Take 1 tablet (750 mg) by mouth 3 times daily 30 tablet 1     omeprazole (PRILOSEC) 20 MG CR capsule Take 1 capsule (20 mg) by mouth daily 30 capsule 6     order for DME Blood pressure cuff machine 1 Units 0     ranitidine (ZANTAC) 300 MG tablet Take 1 tablet (300 mg) by mouth At Bedtime 30 tablet 1         REVIEW OF SYSTEMS    No fever  No sob  No CP  No rash      Past Medical History:   Diagnosis Date     GERD (gastroesophageal reflux disease)      HTN (hypertension)     Previously treated in [facility], not on medication since approx 2014       EXAM  /70 (BP Location: Right arm, Patient Position: Chair, Cuff Size: Adult Regular)   Pulse 74   Temp 97.5  F (36.4  C) (Tympanic)   Wt 79 kg (174 lb 3.2 oz)   SpO2 98%   BMI 32.91 kg/m        Large woman with incr BMI.  HEENT unremarkable  Chest non labored breathing  R hip - normal ROM, non tender  SLR's neg  Some pain moving sitting to standing.      (M25.551) Hip pain, right  (primary encounter diagnosis)  Comment:     This seems more related to soft tissue rather than to arthritis or radiculopathy.  Inactivity and weight may be a factor.    Plan: methocarbamol (ROBAXIN) 750 MG tablet, [facility] PT,         HAND, AND CHIROPRACTIC REFERRAL

## 2019-03-28 ENCOUNTER — OFFICE VISIT (OUTPATIENT)
Dept: PEDIATRICS | Facility: CLINIC | Age: 69
End: 2019-03-28
Payer: COMMERCIAL

## 2019-03-28 VITALS
BODY MASS INDEX: 32.8 KG/M2 | SYSTOLIC BLOOD PRESSURE: 136 MMHG | TEMPERATURE: 97.9 F | DIASTOLIC BLOOD PRESSURE: 80 MMHG | WEIGHT: 173.6 LBS | HEART RATE: 72 BPM | OXYGEN SATURATION: 99 %

## 2019-03-28 DIAGNOSIS — M54.41 ACUTE RIGHT-SIDED LOW BACK PAIN WITH RIGHT-SIDED SCIATICA: Primary | ICD-10-CM

## 2019-03-28 PROCEDURE — 99214 OFFICE O/P EST MOD 30 MIN: CPT | Performed by: FAMILY MEDICINE

## 2019-03-28 RX ORDER — PREDNISONE 20 MG/1
20 TABLET ORAL DAILY
Qty: 7 TABLET | Refills: 0 | Status: SHIPPED | OUTPATIENT
Start: 2019-03-28 | End: 2019-04-04

## 2019-03-28 RX ORDER — ACETAMINOPHEN 325 MG/1
325-650 TABLET ORAL EVERY 6 HOURS PRN
Qty: 60 TABLET | Refills: 1 | Status: SHIPPED | OUTPATIENT
Start: 2019-03-28 | End: 2020-07-16

## 2019-03-28 NOTE — PATIENT INSTRUCTIONS
Take prednisone once a day for 7 days  Take tylenol 1-2 pills every 6 hours for pain    Make appointment to see Physical Therapy         STOP taking the robaxin

## 2019-03-28 NOTE — PROGRESS NOTES
Subjective:   Marck Hernandez is a 68 year old female who presents for   Chief Complaint   Patient presents with     Musculoskeletal Problem     start 3 weeks sx right hip, up into the back and down into the leg, 8/10 tx possibly Robaxin      Since last appointment on 3/19/19 (9 days ago) The Robaxin  Has not helped with her symptoms. The symptoms have been about the same but not worse.   She points to her right lower back and having discomfort traveling down her buttock going into the side of right leg. Endorses mild weakness of this right side. Fortunately, has not tripped or fallen. The pain is also present at night time when laying down.     3 weeks of symptoms total. She rates discomfort as mild-moderate in intensity.   For pain she has tried : nothing other than robaxin    No reported bowel/bladder difficulties.     Oromo speaking  facilitated this visit    Patient Active Problem List    Diagnosis Date Noted     Prediabetes 08/16/2018     Priority: Medium     Hyperlipidemia with target LDL less than 130 07/11/2018     Priority: Medium     Essential hypertension 07/11/2018     Priority: Medium     Gastroesophageal reflux disease without esophagitis 07/11/2018     Priority: Medium       Current Outpatient Medications   Medication     acetaminophen (TYLENOL) 325 MG tablet     methocarbamol (ROBAXIN) 750 MG tablet     predniSONE (DELTASONE) 20 MG tablet     hydrochlorothiazide (HYDRODIURIL) 25 MG tablet     omeprazole (PRILOSEC) 20 MG CR capsule     order for DME     ranitidine (ZANTAC) 300 MG tablet     No current facility-administered medications for this visit.        ROS:  As above per HPI    Objective:   /80 (BP Location: Right arm, Patient Position: Chair, Cuff Size: Adult Large)   Pulse 72   Temp 97.9  F (36.6  C) (Oral)   Wt 78.7 kg (173 lb 9.6 oz)   SpO2 99%   BMI 32.80 kg/m  , Body mass index is 32.8 kg/m .  Gen:  NAD, well-nourished, sitting in chair comfortably  HEENT: EOMI, sclera  anicteric, Head normocephalic, ; nares patent; moist mucous membranes  Neck: trachea midline, no thyromegaly  CV:  Hemodynamically stable  Pulm:  no increased work of breathing   Back: focal right sided discomfort of right lumbar region, fair ROM in flexion/extension for age  Abd: obese  Gait: stable, moderate pace  Extrem: no cyanosis, edema or clubbing  Skin: no obvious rashes or abnormalities  Psych: Euthymic, linear thoughts, normal rate of speech    Assessment & Plan:   Marck Hernandez, 68 year old female who presents with:    Acute right-sided low back pain with right-sided sciatica  Acute presentation of symptoms. Recommending tylenol for discomfort - to help with symptoms of sciatic nerve pain I've prescribed prednisone at a low dose (Given age) for 7 days. I've recommended physical therapy given timeframe of symptoms being 3 weeks duration. Return if no improvement in symptoms. Can consider imaging of hip but I doubt this is due to OA of the ball/socket joint.   - PHYSICAL THERAPY REFERRAL  - predniSONE (DELTASONE) 20 MG tablet  Dispense: 7 tablet; Refill: 0  - acetaminophen (TYLENOL) 325 MG tablet  Dispense: 60 tablet; Refill: 1    Conor Choudhury MD   [facility]    The use of Dragon[employer] dictation services may have been used to construct the content in this note; any grammatical or spelling errors are non-intentional. Please contact the author of this note directly if you are in need of any clarification.

## 2019-04-04 ENCOUNTER — OFFICE VISIT (OUTPATIENT)
Dept: PEDIATRICS | Facility: CLINIC | Age: 69
End: 2019-04-04
Payer: COMMERCIAL

## 2019-04-04 VITALS
TEMPERATURE: 97.7 F | BODY MASS INDEX: 31.76 KG/M2 | WEIGHT: 172.6 LBS | OXYGEN SATURATION: 99 % | HEIGHT: 62 IN | HEART RATE: 71 BPM | DIASTOLIC BLOOD PRESSURE: 70 MMHG | SYSTOLIC BLOOD PRESSURE: 140 MMHG

## 2019-04-04 DIAGNOSIS — M54.10 ACUTE LOW BACK PAIN WITH RADICULAR SYMPTOMS, DURATION LESS THAN 6 WEEKS: Primary | ICD-10-CM

## 2019-04-04 PROCEDURE — 99214 OFFICE O/P EST MOD 30 MIN: CPT | Performed by: INTERNAL MEDICINE

## 2019-04-04 RX ORDER — METHYLPREDNISOLONE 4 MG
TABLET, DOSE PACK ORAL
Qty: 21 TABLET | Refills: 0 | Status: SHIPPED | OUTPATIENT
Start: 2019-04-04 | End: 2020-07-16

## 2019-04-04 RX ORDER — TRAMADOL HYDROCHLORIDE 50 MG/1
50 TABLET ORAL 3 TIMES DAILY PRN
Qty: 10 TABLET | Refills: 0 | Status: SHIPPED | OUTPATIENT
Start: 2019-04-04 | End: 2019-04-07

## 2019-04-04 ASSESSMENT — MIFFLIN-ST. JEOR: SCORE: 1268.16

## 2019-04-04 NOTE — PROGRESS NOTES
SUBJECTIVE:   Marck Hernandez is a 68 year old female who presents to clinic today for the following health issues:    Back pain      Onset: 3 weeks    Description:   Location: right hip  Character: Dull ache    Intensity: severe    Progression of Symptoms: same    Accompanying Signs & Symptoms:  Other symptoms: radiation of pain to lower right leg    History:   Previous similar pain: 68-year-old [place] woman presents today for evaluation of persistent right hip and lower back pain.  Seen with [facility]  today.  Today is the third clinic visit over the past few weeks regarding these symptoms.  Patient evaluated through urgent care and diagnosed with low back pain with radicular symptoms, prescribed low-dose prednisone-patient states prednisone did not help.  Was also referred to physical therapy but patient states she was unable to get a ride.  Today patient presents with her son who states she has had increasing difficulty with pain, often very uncomfortable at night/the pain keeps her up at night.  Patient is Requesting additional medication for pain.  Denies changes in bowel or bladder function.  Pain starts in her lower back and right buttocks and radiates to the posterior leg below her knee.    Precipitating factors:   Trauma or overuse: no     Alleviating factors:  Improved by: rest/inactivity and ice    Therapies Tried and outcome: OTC's, nothing helps        Patient Active Problem List   Diagnosis     Hyperlipidemia with target LDL less than 130     Essential hypertension     Gastroesophageal reflux disease without esophagitis     Prediabetes     Past Surgical History:   Procedure Laterality Date     COLONOSCOPY N/A 6/8/2017    Procedure: COLONOSCOPY;  COLONOSCOPY;  Surgeon: Alpesh Clark MD;  Location: [facility]     NO HISTORY OF SURGERY         Social History     Tobacco Use     Smoking status: Never Smoker     Smokeless tobacco: Never Used   Substance Use Topics     Alcohol use: No      "Family History   Problem Relation Age of Onset     Diabetes No family hx of      Heart Disease No family hx of      Thyroid Disease No family hx of          Current Outpatient Medications   Medication Sig Dispense Refill     acetaminophen (TYLENOL) 325 MG tablet Take 1-2 tablets (325-650 mg) by mouth every 6 hours as needed for mild pain 60 tablet 1     hydrochlorothiazide (HYDRODIURIL) 25 MG tablet Take 1 tablet (25 mg) by mouth daily 90 tablet 3     methocarbamol (ROBAXIN) 750 MG tablet Take 1 tablet (750 mg) by mouth 3 times daily 30 tablet 1             order for DME Blood pressure cuff machine 1 Units 0     ranitidine (ZANTAC) 300 MG tablet Take 1 tablet (300 mg) by mouth At Bedtime 30 tablet 1             omeprazole (PRILOSEC) 20 MG CR capsule Take 1 capsule (20 mg) by mouth daily (Patient not taking: Reported on 4/4/2019) 30 capsule 6       ROS: The following systems have been completely reviewed and are negative except as noted in the HPI: CONSTITUTIONAL,  GASTROINTESTINAL, GENITOURINARY, DERMATOLOGIC, NEUROLOGIC, PSYCHIATRIC.     OBJECTIVE:                                                    /70 (BP Location: Right arm, Patient Position: Chair, Cuff Size: Adult Regular)   Pulse 71   Temp 97.7  F (36.5  C) (Oral)   Ht 1.57 m (5' 1.81\")   Wt 78.3 kg (172 lb 9.6 oz)   SpO2 99%   BMI 31.76 kg/m   Body mass index is 31.76 kg/m .  GENERAL:  alert,  no distress  NECK: no tenderness, no adenopathy  RESP: lungs clear to auscultation - no rales, no rhonchi, no wheezes  CV: regular rates and rhythm, normal S1 S2, no S3 or S4 and no murmur, no click or rub   BACK: Lumbosacral spine area reveals no midline tenderness or mass.  Straight leg raise is positive on the right   NEURO: DTR's and motor exam of the lower extremities are normal.   MS: extremities- no edema     ASSESSMENT/PLAN:                                                        ICD-10-CM    1. Acute low back pain with radicular symptoms, duration " less than 6 weeks M54.10 traMADol (ULTRAM) 50 MG tablet     methylPREDNISolone (MEDROL DOSEPAK) 4 MG tablet therapy pack     MR Lumbar Spine w/o Contrast      68-year-old Cam[place] woman presents today with increasing right lower back pain with radicular symptoms.  History and exam concerning for lumbar radiculopathy, consider lumbar disc disease also consider mass lesion or neoplastic process.  Plan for MRI lumbar spine.  Start Medrol Dosepak.  Tramadol as needed for breakthrough pain.  Will contact patient with results.    Duke Zhu MD  [facility]

## 2019-04-04 NOTE — PATIENT INSTRUCTIONS
Start medrol dosepak  Tramadol as needed for more severe pain  Can continue acetaminophen as needed  [facility] will call regarding MRI  We will contact you with results  Follow-up in 2 weeks

## 2019-04-26 ENCOUNTER — HOSPITAL ENCOUNTER (OUTPATIENT)
Dept: MRI IMAGING | Facility: CLINIC | Age: 69
Discharge: HOME | End: 2019-04-26
Attending: INTERNAL MEDICINE | Admitting: INTERNAL MEDICINE
Payer: COMMERCIAL

## 2019-04-26 DIAGNOSIS — M54.10 ACUTE LOW BACK PAIN WITH RADICULAR SYMPTOMS, DURATION LESS THAN 6 WEEKS: ICD-10-CM

## 2019-04-26 PROCEDURE — 72148 MRI LUMBAR SPINE W/O DYE: CPT

## 2019-04-28 ENCOUNTER — TELEPHONE (OUTPATIENT)
Dept: PEDIATRICS | Facility: CLINIC | Age: 69
End: 2019-04-28

## 2019-04-28 DIAGNOSIS — M51.369 DDD (DEGENERATIVE DISC DISEASE), LUMBAR: Primary | ICD-10-CM

## 2019-04-28 NOTE — TELEPHONE ENCOUNTER
Please call pt.  Recent MRI lumbar spine shows degenerative changes.  Referred  pt to see our medical spine specialist regarding her back pain.   Ortho  will contact her

## 2019-07-15 ENCOUNTER — TELEPHONE (OUTPATIENT)
Dept: PEDIATRICS | Facility: CLINIC | Age: 69
End: 2019-07-15

## 2019-07-15 DIAGNOSIS — M54.41 ACUTE RIGHT-SIDED LOW BACK PAIN WITH RIGHT-SIDED SCIATICA: Primary | ICD-10-CM

## 2019-07-15 NOTE — TELEPHONE ENCOUNTER
Reason for Call:  Medication or Appointment.    Detailed comments: Pt's son called stating that the pt is still having a lot of pain in her back and would like to be prescribed that same medication that was prescribed at the last appt for these symptoms as it did help a little. Pt would like to see Dr Zhu as soon as his schedule allows. Please advise further. Thanks!    Phone Number Patient can be reached at: Home number on file [phone] (home)    Best Time: any    Can we leave a detailed message on this number? Not Applicable    Call taken on 7/15/2019 at 8:50 AM by Uzma Hurtado  ;

## 2019-07-16 NOTE — TELEPHONE ENCOUNTER
"Called pt/pt's son (same number). No answer. LVM to call back.    See Dr Zhu's previous note.    Referral to Orthopedic  Services at [facility], call to schedule:  [phone]    Shaun \"Felton\" DOMENICO Polanco - Triage  [facility]    "

## 2019-07-16 NOTE — TELEPHONE ENCOUNTER
Please call.   See telephone encounter 4/28  Pt was referred to Medical Spine clinic, [facility]  Doesn't look like she went or wasn't contacted--    Pt is non english speaking, please speak with family and make sure she gets scheduled with Medical Spine clinic.    Referral done

## 2019-07-17 ENCOUNTER — TELEPHONE (OUTPATIENT)
Dept: PALLIATIVE MEDICINE | Facility: CLINIC | Age: 69
End: 2019-07-17

## 2019-07-17 ENCOUNTER — OFFICE VISIT (OUTPATIENT)
Dept: PALLIATIVE MEDICINE | Facility: CLINIC | Age: 69
End: 2019-07-17
Payer: COMMERCIAL

## 2019-07-17 VITALS
SYSTOLIC BLOOD PRESSURE: 164 MMHG | WEIGHT: 181.1 LBS | DIASTOLIC BLOOD PRESSURE: 92 MMHG | HEART RATE: 75 BPM | OXYGEN SATURATION: 98 % | BODY MASS INDEX: 33.33 KG/M2

## 2019-07-17 DIAGNOSIS — M48.061 SPINAL STENOSIS OF LUMBAR REGION, UNSPECIFIED WHETHER NEUROGENIC CLAUDICATION PRESENT: ICD-10-CM

## 2019-07-17 DIAGNOSIS — M54.16 LUMBAR RADICULOPATHY: Primary | ICD-10-CM

## 2019-07-17 PROCEDURE — 99242 OFF/OP CONSLTJ NEW/EST SF 20: CPT | Performed by: PHYSICAL MEDICINE & REHABILITATION

## 2019-07-17 RX ORDER — TRAMADOL HYDROCHLORIDE 50 MG/1
50 TABLET ORAL 2 TIMES DAILY PRN
Qty: 14 TABLET | Refills: 0 | Status: SHIPPED | OUTPATIENT
Start: 2019-07-17 | End: 2020-07-16

## 2019-07-17 ASSESSMENT — PAIN SCALES - GENERAL: PAINLEVEL: EXTREME PAIN (8)

## 2019-07-17 NOTE — PROGRESS NOTES
[facility]    Date of visit: 7/17/2019    Primary Care Provider: Dr. Duke Zhu    Reason for consultation:    Marck Hernandez is a 69 year old female who is seen in consultation today at the request of her primary care physician, Duke Zhu.     Consultation and Evaluation for: Medical spine evaluation    Patient presented to  11 minutes late. Phone interpretor needed to complete consent forms at the . Started to be roomed 40 minutes into her appointment time. This is an abbreviated visit today. She is accompanied by her son.     Review of [place] Prescription Monitoring Program (): Today I have also reviewed the patient's history of controlled substance use, as provided by [place] licensed pharmacies and prescriber dispensers.     Review of Pain Questionnaire: Please see the [facility] health questionnaire, which the patient completed and reviewed with me in detail.    Review of Electronic Chart: Today I have also reviewed available medical information in the patient's medical record at [facility] ([facility]), including relevant provider notes, laboratory work, and imaging.     Chief Complaint:    Back pain    Pain history:  Marck Hernandez is a 69 year old female with a PMH significant for GERD, HLD, HTN, prediabetes who first started having problems with back pain about one year ago. Insidious in onset. Located in right low back with radiation into the buttock, lateral thigh and lower leg into the top of the foot. She describes the pain as burning and stabbing in quality. It is constant. Pain tends to be worse at night and prevents her from sleeping. On average pain is 10/10 in severity.     Denies red flags including: bowel or bladder symptoms, fever, chills, saddle anesthesia, profound motor loss, history of cancer, history of immune compromise, weight loss.     Current medication treatments include:  None    Pain medications are being  prescribed by PCP.    Previous medication treatments included:  Medrol dosepak - H   Tramadol - H  Tylenol - [facility]    Other treatments have included:  Behavioral interventions: None  PT: None  Manual Medicine: none  Surgeries: None  Acupuncture: None  TENs Unit: None  Injections: None    Past Medical History:  Past Medical History:   Diagnosis Date     GERD (gastroesophageal reflux disease)      HTN (hypertension)     Previously treated in [facility], not on medication since approx 2014     Past Surgical History:  Past Surgical History:   Procedure Laterality Date     COLONOSCOPY N/A 6/8/2017    Procedure: COLONOSCOPY;  COLONOSCOPY;  Surgeon: Alpesh Clark MD;  Location:  GI     NO HISTORY OF SURGERY       Medications:  Current Outpatient Medications   Medication Sig Dispense Refill     acetaminophen (TYLENOL) 325 MG tablet Take 1-2 tablets (325-650 mg) by mouth every 6 hours as needed for mild pain 60 tablet 1     hydrochlorothiazide (HYDRODIURIL) 25 MG tablet Take 1 tablet (25 mg) by mouth daily 90 tablet 3     methocarbamol (ROBAXIN) 750 MG tablet Take 1 tablet (750 mg) by mouth 3 times daily 30 tablet 1     methylPREDNISolone (MEDROL DOSEPAK) 4 MG tablet therapy pack Follow Package Directions 21 tablet 0     omeprazole (PRILOSEC) 20 MG CR capsule Take 1 capsule (20 mg) by mouth daily (Patient not taking: Reported on 4/4/2019) 30 capsule 6     order for DME Blood pressure cuff machine 1 Units 0     ranitidine (ZANTAC) 300 MG tablet Take 1 tablet (300 mg) by mouth At Bedtime 30 tablet 1     Allergies:   No Known Allergies    Social History:  Home situation: Lives in [place], [place].  Occupation/Schooling: Does not work  Tobacco use: none  Alcohol use: none  Drug use: none    Family history:  Family History   Problem Relation Age of Onset     Diabetes No family hx of      Heart Disease No family hx of      Thyroid Disease No family hx of      Diagnostic Tests:  Lumbar MRI completed on 4/26/2019  showed:  FINDINGS: Alignment is maintained. Bone marrow demonstrates scattered  mild marrow heterogeneity with mild degenerative endplate change most  conspicuous at L4-5 and L5-S1. Inferior endplate T12 Schmorl's node is  present with minimal associated marrow edema. Multilevel mild to  moderate disc height loss is present throughout. Multilevel mild facet  arthropathy is present with moderate facet arthropathy at L4-5 and  L5-S1. Multilevel epidural lipomatosis is present contributing to  spinal canal narrowing most conspicuous at L3-4, L4-5, and L5-S1.  Conus medullaris and cauda equina are unremarkable. Paraspinal soft  tissues are unremarkable.     Segmental Analysis:      T12-L1:  No significant spinal canal or foraminal stenosis.      L1-L2:  No significant spinal canal or foraminal stenosis.       L2-L3:  Broad-based disc bulge and mild facet arthropathy. Mild spinal  canal stenosis. Mild bilateral foraminal stenosis.     L3-L4:  Broad-based disc bulge and mild bilateral facet arthropathy.  Moderate epidural lipomatosis. Mild to moderate spinal canal stenosis.  Mild bilateral foraminal stenosis.     L4-L5:  Broad-based disc bulge, moderate bilateral facet arthropathy,  and moderate epidural lipomatosis. Moderate to severe spinal canal  stenosis. Mild bilateral foraminal stenosis.       L5-S1:  Broad-based disc bulge, moderate bilateral facet arthropathy,  and mild epidural lipomatosis. Moderate spinal canal stenosis. Mild  bilateral foraminal stenosis.                                                                      IMPRESSION: Degenerative change as detailed, most conspicuous at L4-5,  and L5-S1.    Review of Systems:    POSTIVE IN BOLD  GENERAL: fever/chills, fatigue, general unwell feeling, weight gain/loss.  HEAD/EYES:  headache, dizziness, or vision changes.    EARS/NOSE/THROAT:  Nosebleeds, hearing loss, sinus infection, earache, tinnitus.  IMMUNE:  Allergies, cancer, immune deficiency, or  infections.  SKIN:  Urticaria, rash, hives  HEME/Lymphatic:   anemia, easy bruising, easy bleeding.  RESPIRATORY:  cough, wheezing, or shortness of breath  CARDIOVASCULAR/Circulation:  Extremity edema, syncope, hypertension, tachycardia, or angina.  GASTROINTESTINAL:  abdominal pain, nausea/emesis, diarrhea, constipation,  hematochezia, or melena.  ENDOCRINE:  Diabetes, steroid use,  thyroid disease or osteoporosis.  MUSCULOSKELETAL: neck pain, back pain, arthralgia, arthritis, or gout.  GENITOURINARY:  frequency, urgency, dysuria, difficulty voiding, hematuria or incontinence.  NEUROLOGIC:  weakness, numbness, paresthesias, seizure, tremor, stroke or memory loss.  PSYCHIATRIC:  depression, anxiety, stress, suicidal thoughts or mood swings.     Physical Exam:  Vitals:    07/17/19 0856 07/17/19 0925   BP: (!) 162/101 (!) 164/92   Pulse: 75    SpO2: 98%    Weight: 82.1 kg (181 lb 1.6 oz)      Exam:  Constitutional: alert and mild distress  Head: normocephalic. Atraumatic.   Eyes: no redness or jaundice noted   ENT: oropharnx normal.  MMM.  Neck supple.    Respiratory: breathing unlabored on room air  Gastrointestinal: soft, non-tender  : deferred  Skin: no suspicious lesions or rashes  Psychiatric: mentation appears normal and affect normal/bright    Musculoskeletal exam:  Gait/Station/Posture: Gait antalgic and slow. At baseline leans laterally to the left.   Thoracic spine:  Normal   Lumbar spine: Decreased in flexion and extension secondary to pain  Myofascial tenderness:  Tender to palpation in right lumbar paraspinals and gluteal  Musculature.   Straight leg exam: negative bilaterally    Bilateral hip motion without discomfort     Neurologic exam:  Motor:  5/5 LE strength,    Reflexes:     Patella:  R:  2/4 L: 2/4   Achilles:  R:  1/4 L: 1/4   Sensory:  (lower extremities):   Light touch: diminished in non-dermatomal pattern in right lower extremity compared to left   Allodynia: absent    Hyperalgesia: absent      Assessment:  Marck Hernandez is a 69 year old female with a past medical history significant for GERD, HLD, HTN, prediabetes who presents with complaints of right sided back and leg pain.    1. Chronic back pain: Symptoms began insidiously about one year ago with pain in the right low back radiating into the buttock, lateral thigh, lateral leg and mainly the top of the foot. Worsening over time. No red flag symptoms present. Neuro exam is normal with exception of decreased sensation in non-dermatomal pattern in right leg compared to the left. MRI of lumbar spine shows a broad-based disc bulge at L4-5 and L5-1 with moderate facet arthropathy, moderate to severe spinal canal stenosis and mild bilateral foraminal stenosis. She has had some temporary improvement in symptoms with medrol dosepak and Tramadol in the past for these symptoms. Etiology of her pain is most likely multifactorial secondary to lumbar spinal canal stenosis and radiculitis/radiculopathy at right L4-5 vs L5-S1.       Plan:  Diagnosis reviewed, treatment options addressed, and risks/benefits discussed. The patient was involved in shared decision making regarding the plan as laid out below.    1. Physical Therapy:   PT to evaluate and treat for back pain. PT will assist with pacing, coping strategies, stretching, and strengthening as appropriate.  2. Diagnostic Studies:  Reviewed results of lumbar spine MRI today with her and her son.   3. Medication Management:  1 week prescription for Tramadol given since she has had good relief with it previously. Discussed that this is a one time prescription and is not a good option long term. Prescription written for Tramadol 50 mg BID prn x 7 days.   4. Further procedures recommended: order placed for right L4-5 vs L5-S1 Transforaminal epidural steroid injection.  5. Follow up: 2-3 weeks after injection    Total time spent face to face was 30 minutes and more than 50% of face to face time was spent in  counseling and/or coordination of care regarding the diagnosis and recommendations above.      Shilpa Turk MD  Medical Spine Specialist  [facility]

## 2019-07-17 NOTE — TELEPHONE ENCOUNTER
Pre-screening Questions for Radiology Injections:    Injection to be done at which interventional clinic site? [facility]    Instruct patient to arrive as directed prior to the scheduled appointment time:    [place] minutes before      [place]: 30 minutes before; if IV needed 1 hour before     Procedure ordered by Dr. Turk    Procedure ordered? Lumbar ELLEN        Transforaminal Cervical ELLEN - Dr. Daisy Galarza ONLY    What insurance would patient like us to bill for this procedure? [employer]      Worker's comp or MVA (motor vehicle accident) -Any injection DO NOT SCHEDULE and route to Marybel Pathak.      [employer] insurance - For SI joint injections, DO NOT SCHEDULE and route Marybel Pathak.       [employer] - Any injection besides hip/shoulder/knee joint DO NOT SCHEDULE and route to Marybel Pathak. She will obtain PA and call pt back to schedule procedure or notify pt of denial.       [employer]-Route to Marybel for review      IF SCHEDULING IN [place] AND NEEDS A PA, IT IS OKAY TO SCHEDULE. [place] HANDLES THEIR OWN PA'S AFTER THE PATIENT IS SCHEDULED. PLEASE SCHEDULE AT LEAST 1 WEEK OUT SO A PA CAN BE OBTAINED.      Any chance of pregnancy? NO   If YES, do NOT schedule and route to RN pool    Is an  needed? Yes - OROMO     Patient has a drive home? (mandatory) YES: INFORMED    Is patient taking any blood thinners (plavix, coumadin, jantoven, warfarin, heparin, pradaxa or dabigatran )? No   If hold needed, do NOT schedule, route to RN pool     Is patient taking any aspirin products (includes Excedrin and Fiorinal)? No     If more than 325mg/day do NOT schedule; route to RN pool     For CERVICAL procedures, hold all aspirin products for 6 days.     Tell pt that if aspirin product is not held for 6 days, the procedure WILL BE cancelled.      Does the patient have a bleeding or clotting disorder? No     If YES, okay to schedule AND route to RN nurse pool    For any patients with platelet count <100,  must be forwarded to provider    Is patient diabetic?  No  If YES, have them bring their glucometer.    Does patient have an active infection or treated for one within the past week? No     Is patient currently taking any antibiotics?  No     For patients on chronic, preventative, or prophylactic antibiotics, procedures may be scheduled.     For patients on antibiotics for active or recent infection:antibiotic course must have been completed for 4 days    Is patient currently taking any steroid medications? (i.e. Prednisone, Medrol)  No     For patients on steroid medications, course must have been completed for 4 days    Reviewed with patient:  If you are started on any steroids or antibiotics between now and your appointment, you must contact us because the procedure may need to be cancelled.  Yes    Is patient actively being treated for cancer or immunocompromised? No  If YES, do NOT schedule and route to RN pool     Are you able to get on and off an exam table with minimal or no assistance? Yes  If NO, do NOT schedule and route to RN pool    Are you able to roll over and lay on your stomach with minimal or no assistance? Yes  If NO, do NOT schedule and route to RN pool     Any allergies to contrast dye, iodine, shellfish, or numbing and steroid medications? No  If YES, route to RN pool AND add allergy information to appointment notes    Allergies: Patient has no known allergies.      Has the patient had a flu shot or any other vaccinations within 7 days before or after the procedure.  No     Does patient have an MRI/CT?  YES: INFORMED  (SI joint, hip injections, lumbar sympathetic blocks, and stellate ganglion blocks do not require an MRI)    Was the MRI done w/in the last 3 years?  Yes    Was MRI done at [facility]? Yes      If not, where was it done? N/A       If MRI was not done at [facility], [facility] or [facility] do NOT schedule and route to nursing.  If pt has an imaging disc, the injection may be scheduled  but pt has to bring disc to appt. If they show up w/out disc the injection cannot be done    Reminders (please tell patient if applicable):       Instructed pt to arrive 30 minutes early for IV start if this is for a cervical procedure, ALL sympathetic (stellate ganglion, hypogastric, or lumbar sympathetic block) and all sedation procedures (RFA, spinal cord stimulation trials).  Not Applicable   -IVs are not routinely placed for Dr. Neil cervical cases   -Dr. Sharma: IVs for cervical ESIs and cervical TBDs (not CMBBs/facet inj)      If NPO for sedation, informed patient that it is okay to take medications with sips of water (except if they are to hold blood thinners).  Not Applicable   *DO take blood pressure medication if it is prescribed*      If this is for a cervical ELLEN, informed patient that aspirin needs to be held for 6 days.   Not Applicable      For all patients not having spinal cord stimulator (SCS) trials or radiofrequency ablations (RFAs), informed patient:    IV sedation is not provided for this procedure.  If you feel that an oral anti-anxiety medication is needed, you can discuss this further with your referring provider or primary care provider.  The Pain Clinic provider will discuss specifics of what the procedure includes at your appointment.  Most procedures last 10-20 minutes.  We use numbing medications to help with any discomfort during the procedure.  Not Applicable      Do not schedule procedures requiring IV placement in the first appointment of the day or first appointment after lunch. Do NOT schedule at 0745, 0815 or 1245. N/A      For patients 85 or older we recommend having an adult stay w/ them for the remainder of the day.   N/A    Does the patient have any questions?  NO  Lorraine Figueroa  [facility]

## 2019-07-17 NOTE — TELEPHONE ENCOUNTER
SALENA to schedule right L4-5 vs L5-S1 TFESI .      Marcos WONG    [facility] [place]

## 2019-07-17 NOTE — PATIENT INSTRUCTIONS
Thank you for coming to [facility] for your care. It is my goal to partner with you to help you reach your optimal state of health.     You were seen today for your back pain. As discussed during your visit these are the recommendations:    1. Medications: Prescription given for tramadol 50 mg twice daily as needed for 7 days. This is a one time prescription.   2. Therapies: Order placed to start PT for you back. You can schedule on your way out today.  3. Procedures: Order placed for a lumbar epidural steroid injection. You will be called to schedule this.   4. Follow up: 2-3 weeks after injection in clinic.  ----------------------------------------------------------------  Clinic Number:  [phone]     Call with any questions about your care and for scheduling assistance.     Calls are returned Monday through Friday between 8 AM and 4:30 PM. We usually get back to you within 2 business days depending on the issue/request.    If we are prescribing your medications:    For opioid medication refills, call the clinic or send a [employer] message 7 days in advance.  Please include:    Name of requested medication    Name of the pharmacy.    For non-opioid medications, call your pharmacy directly to request a refill. Please allow 3-4 days to be processed.     Per [place] State Law:    All controlled substance prescriptions must be filled within 30 days of being written.      For those controlled substances allowing refills, pickup must occur within 30 days of last fill.      We believe regular attendance is key to your success in our program!      Any time you are unable to keep your appointment we ask that you call us at least 24 hours in advance to cancel.This will allow us to offer the appointment time to another patient.     Multiple missed appointments may lead to dismissal from the clinic.

## 2019-07-22 ENCOUNTER — ANCILLARY PROCEDURE (OUTPATIENT)
Dept: GENERAL RADIOLOGY | Facility: CLINIC | Age: 69
End: 2019-07-22
Attending: PHYSICAL MEDICINE & REHABILITATION
Payer: COMMERCIAL

## 2019-07-22 ENCOUNTER — RADIOLOGY INJECTION OFFICE VISIT (OUTPATIENT)
Dept: PALLIATIVE MEDICINE | Facility: CLINIC | Age: 69
End: 2019-07-22
Attending: PHYSICAL MEDICINE & REHABILITATION
Payer: COMMERCIAL

## 2019-07-22 VITALS — SYSTOLIC BLOOD PRESSURE: 180 MMHG | HEART RATE: 77 BPM | OXYGEN SATURATION: 97 % | DIASTOLIC BLOOD PRESSURE: 89 MMHG

## 2019-07-22 DIAGNOSIS — M54.16 LUMBAR RADICULOPATHY: ICD-10-CM

## 2019-07-22 DIAGNOSIS — M54.16 LUMBAR RADICULOPATHY: Primary | ICD-10-CM

## 2019-07-22 PROCEDURE — 64483 NJX AA&/STRD TFRM EPI L/S 1: CPT | Mod: RT | Performed by: PHYSICAL MEDICINE & REHABILITATION

## 2019-07-22 NOTE — PATIENT INSTRUCTIONS
[facility] Procedure Discharge Instructions    Today you saw:    Dr. Shilpa Turk    Your procedure:  Epidural steroid injection   Medications used:  Lidocaine (anesthetic)  Dexamethasone (steroid) Omnipaque (contrast)             Be cautious when walking as numbness and/or weakness in the legs may occur up to 6-8 hours after the procedure due to effect of the local anesthetic    Do not drive for 6 hours. The effect of the local anesthetic could slow your reflexes.     Avoid strenuous activity for the first 24 hours. You may resume your regular activities after that.     You may shower, however avoid swimming, tub baths or hot tubs for 24 hours following your procedure    You may have a mild to moderate increase in pain for several days following the injection.      You may use ice packs for 10-15 minutes, 3 to 4 times a day at the injection site for comfort    Do not use heat to painful areas for 6 to 8 hours. This will give the local anesthetic time to wear off and prevent you from accidentally burning your skin.    Unless you have been directed to avoid the use of anti-inflammatory medications (NSAIDS-ibuprofen, Aleve, Motrin), you may use these medications or Tylenol for pain control if needed.     With diabetes, check your blood sugar more frequently than usual as your blood sugar may be higher than normal for 10-14 days following a steroid injection. Contact your doctor who manages your diabetes if your blood sugar is higher than usual    Possible side effects of steroids that you may experience include flushing, elevated blood pressure, increased appetite, mild headaches and restlessness.  All of these symptoms will get better with time.    It may take up to 14 days for the steroid medication to start working although you may feel the effect as early as a few days after the procedure.     Follow up with your referring provider in 2-3 weeks      If you experience any of the following, call the pain  center line during work hours at [phone] or on-call physician after hours at [phone]:  -Fever over 100 degree F  -Swelling, bleeding, redness, drainage, warmth at the injection site  -Progressive weakness or numbness in your legs or arms  -Loss of bowel or bladder function  -Unusual headache that is not relieved by Tylenol or your regular headache medication  -Unusual new onset of pain that is not improving

## 2019-07-22 NOTE — PROGRESS NOTES
"[facility] - Procedure Note    Date of Service: 7/22/2019    Procedure performed: Right L5-S1 transforaminal epidural steroid injection with fluoroscopic guidance  Diagnosis: Lumbar spondylosis; Lumbar radiculitis/radiculopathy  : Shilpa Turk MD   Anesthesia: None  Complications: None    Indications: Marck Hernandez is a 69 year old female who was seen by me in clinic and presents today for lumbar transforaminal epidural steroid injection. The patient describes right low back pain with radiation into the buttock, lateral thigh and lower leg into the top of the foot. The patient has been exhibiting symptoms consistent with lumbar intraspinal inflammation and radiculopathy. Symptoms have been persistent, disabling, and intermittently severe. The patient reports minimal improvement with conservative treatment, including medications.    Lumbar MRI was done on 4/26/2019 which showed:   \"FINDINGS: Alignment is maintained. Bone marrow demonstrates scattered  mild marrow heterogeneity with mild degenerative endplate change most  conspicuous at L4-5 and L5-S1. Inferior endplate T12 Schmorl's node is  present with minimal associated marrow edema. Multilevel mild to  moderate disc height loss is present throughout. Multilevel mild facet  arthropathy is present with moderate facet arthropathy at L4-5 and  L5-S1. Multilevel epidural lipomatosis is present contributing to  spinal canal narrowing most conspicuous at L3-4, L4-5, and L5-S1.  Conus medullaris and cauda equina are unremarkable. Paraspinal soft  tissues are unremarkable.     Segmental Analysis:      T12-L1:  No significant spinal canal or foraminal stenosis.      L1-L2:  No significant spinal canal or foraminal stenosis.       L2-L3:  Broad-based disc bulge and mild facet arthropathy. Mild spinal  canal stenosis. Mild bilateral foraminal stenosis.     L3-L4:  Broad-based disc bulge and mild bilateral facet arthropathy.  Moderate epidural " "lipomatosis. Mild to moderate spinal canal stenosis.  Mild bilateral foraminal stenosis.     L4-L5:  Broad-based disc bulge, moderate bilateral facet arthropathy,  and moderate epidural lipomatosis. Moderate to severe spinal canal  stenosis. Mild bilateral foraminal stenosis.       L5-S1:  Broad-based disc bulge, moderate bilateral facet arthropathy,  and mild epidural lipomatosis. Moderate spinal canal stenosis. Mild  bilateral foraminal stenosis.\"    Allergies:    No Known Allergies     Vitals:  BP (!) 156/101   Pulse 73   SpO2 100%     Review of Systems: The patient denies recent fever, chills, illness, use of antibiotics or anticoagulants. All other 10-point review of systems negative.     Procedure: The procedure and risks were explained, and informed written consent was obtained from the patient. Risks include but are not limited to: infection, bleeding, increased pain, and damage to soft tissue, nerve, muscle, and vasculature structures. After getting informed consent, patient was brought into the procedure suite and was placed in a prone position on the procedure table. A Pause for the Cause was performed. Patient was prepped and draped in sterile fashion.     After identifying the right L5-S1 neuroforamen, the C-arm was rotated to a right lateral oblique angle.  A total of 2 mL of Lidocaine 1% was used to anesthetize the skin and the needle track at a skin entry site coaxial with the fluoroscopy beam, and overriding the superior aspect of the neuroforamen.  A 22 gauge 5 inch spinal needle was advanced under intermittent fluoroscopy until it entered the foramen superiorly.    The position was then inspected from anteroposterior and lateral views, and the needle adjusted appropriately.  After negative aspiration, a total of 1 mL of Omnipaque-300 was injected using static and continuous fluoroscopy confirming appropriate position, with spread along the nerve root sheath and into the epidural space, with no " intravascular or intrathecal uptake. 9 mL of Omnipaque-300 was wasted.    2 mL of 1% lidocaine with 10 mg of dexamethasone was injected.  The needle was removed. Hemostasis was achieved, the area was cleaned, and bandaids were placed when appropriate. Images were saved to PACS.    The patient tolerated the procedure well, and was taken to the recovery room, and there was no evidence of procedural complications. No new sensory or motor deficits were noted following the procedure. The patient was stable and able to ambulate on discharge home. Post-procedure instructions were provided.     Pre-procedure pain score: 9/10 in the back, 9/10 in the leg  Post-procedure pain score: 5/10 in the back, 5/10 in the leg    Assessment/Plan: Marck Hernandez is a 69 year old female s/p right L5-S1 transforaminal epidural steroid injection today for lumbar spondylosis, radiculitis/radiculopathy.     1. Following today's procedure, the patient was advised to contact the [facility] for any of the following:   Fever, chills, or night sweats   New onset of pain, numbness, or weakness   Any questions/concerns regarding the procedure  If unable to contact the Pain Center, the patient was instructed to go to a local Emergency Room for any complications.   2. The patient will receive a follow-up call in 1 week.  3. The patient should follow-up with me in 2-3 week if pain persists.      Shilpa Turk MD  [facility]

## 2019-07-22 NOTE — NURSING NOTE
Pre-procedure Intake    Have you been fasting? NA    If yes, for how long?     Are you taking a prescribed blood thinner such as coumadin, Plavix, Xarelto?    No    If yes, when did you take your last dose?     Do you take aspirin?  No    If cervical procedure, have you held aspirin for 6 days?   NA    Do you have any allergies to contrast dye, iodine, steroid and/or numbing medications?  NO    Are you currently taking antibiotics or have an active infection?  NO    Have you had a fever/elevated temperature within the past week? NO    Are you currently taking oral steroids? NO    Do you have a ? Yes       Are you pregnant or breastfeeding?  NO    Are the vital signs normal?  No: BP:156/101

## 2020-07-06 ENCOUNTER — TELEPHONE (OUTPATIENT)
Dept: PEDIATRICS | Facility: CLINIC | Age: 70
End: 2020-07-06

## 2020-07-06 NOTE — TELEPHONE ENCOUNTER
Red Flag call transferred to RN to discuss chest pain.     The Pt's son, Ash (consent is on file) called in to try and schedule the Pt for a visit with PCP to discuss ongoing chronic pain in back/hips and chest wall pain.   Pain in chest has been present for 2 years. Not new chest pain, no trouble breathing. Pain is chest has been typical for her.     She does not want to go back to pain management for this, nor does she want any additional injections.     She would like to discuss with Dr. Zhu medication options for pain.       Routing to a  to reach out to the Pt's son and help them schedule a visit. Thank you!    Cee Lynne RN   [facility] -- Triage Nurse

## 2020-07-06 NOTE — TELEPHONE ENCOUNTER
Left message for patient's son to call clinic to set up appt.  Could offer 7/16/20 at 1:20.  SINAN Dougherty

## 2020-07-14 NOTE — PROGRESS NOTES
Pre-Visit Planning     Future Appointments   Date Time Provider Department Center   7/16/2020  1:20 PM Duke Zhu MD [facility]     Arrival Time for this Appointment:  1:00 PM   Appointment Notes for this encounter:      PLEASE COPY INS CARD  pain - see TE; ok per AVW  phone interp avail [phone] option 1 or ipad    Questionnaires Reviewed/Assigned        Patient preferred phone number: [phone]    Unable to reach. Left voicemail. Advised patient to call clinic back at [phone].

## 2020-07-16 ENCOUNTER — OFFICE VISIT (OUTPATIENT)
Dept: PEDIATRICS | Facility: CLINIC | Age: 70
End: 2020-07-16
Payer: COMMERCIAL

## 2020-07-16 VITALS
HEART RATE: 78 BPM | HEIGHT: 61 IN | BODY MASS INDEX: 30.21 KG/M2 | SYSTOLIC BLOOD PRESSURE: 164 MMHG | RESPIRATION RATE: 16 BRPM | DIASTOLIC BLOOD PRESSURE: 87 MMHG | TEMPERATURE: 98.9 F | OXYGEN SATURATION: 96 % | WEIGHT: 160 LBS

## 2020-07-16 DIAGNOSIS — I10 ESSENTIAL HYPERTENSION: ICD-10-CM

## 2020-07-16 DIAGNOSIS — R10.13 EPIGASTRIC PAIN: Primary | ICD-10-CM

## 2020-07-16 LAB
BASOPHILS # BLD AUTO: 0 10E9/L (ref 0–0.2)
BASOPHILS NFR BLD AUTO: 0.4 %
DIFFERENTIAL METHOD BLD: NORMAL
EOSINOPHIL # BLD AUTO: 0.2 10E9/L (ref 0–0.7)
EOSINOPHIL NFR BLD AUTO: 4 %
ERYTHROCYTE [DISTWIDTH] IN BLOOD BY AUTOMATED COUNT: 12.7 % (ref 10–15)
HCT VFR BLD AUTO: 40.5 % (ref 35–47)
HGB BLD-MCNC: 13.5 G/DL (ref 11.7–15.7)
LIPASE SERPL-CCNC: 44 U/L (ref 73–393)
LYMPHOCYTES # BLD AUTO: 2.5 10E9/L (ref 0.8–5.3)
LYMPHOCYTES NFR BLD AUTO: 53.7 %
MCH RBC QN AUTO: 28.7 PG (ref 26.5–33)
MCHC RBC AUTO-ENTMCNC: 33.3 G/DL (ref 31.5–36.5)
MCV RBC AUTO: 86 FL (ref 78–100)
MONOCYTES # BLD AUTO: 0.4 10E9/L (ref 0–1.3)
MONOCYTES NFR BLD AUTO: 7.6 %
NEUTROPHILS # BLD AUTO: 1.6 10E9/L (ref 1.6–8.3)
NEUTROPHILS NFR BLD AUTO: 34.3 %
PLATELET # BLD AUTO: 306 10E9/L (ref 150–450)
RBC # BLD AUTO: 4.71 10E12/L (ref 3.8–5.2)
WBC # BLD AUTO: 4.7 10E9/L (ref 4–11)

## 2020-07-16 PROCEDURE — 85025 COMPLETE CBC W/AUTO DIFF WBC: CPT | Performed by: INTERNAL MEDICINE

## 2020-07-16 PROCEDURE — 36415 COLL VENOUS BLD VENIPUNCTURE: CPT | Performed by: INTERNAL MEDICINE

## 2020-07-16 PROCEDURE — 80053 COMPREHEN METABOLIC PANEL: CPT | Performed by: INTERNAL MEDICINE

## 2020-07-16 PROCEDURE — 83690 ASSAY OF LIPASE: CPT | Performed by: INTERNAL MEDICINE

## 2020-07-16 PROCEDURE — 99214 OFFICE O/P EST MOD 30 MIN: CPT | Performed by: INTERNAL MEDICINE

## 2020-07-16 RX ORDER — LISINOPRIL 10 MG/1
10 TABLET ORAL DAILY
Qty: 30 TABLET | Refills: 1 | Status: SHIPPED | OUTPATIENT
Start: 2020-07-16 | End: 2020-08-19

## 2020-07-16 ASSESSMENT — MIFFLIN-ST. JEOR: SCORE: 1188.14

## 2020-07-16 NOTE — PROGRESS NOTES
SUBJECTIVE:                                                    Marck Hernandez is a 70 year old female who presents to clinic today for the following health issues:    70-year-old woman from [place], non-English speaking, presents today for evaluation with her son.  The son and family's main concern is intermittent complaints of abdominal discomfort.  Patient describes episodic left-sided abdominal discomfort described as burning or a dull sensation.  Previously treated for acid reflux with some improvement on omeprazole.  Patient has since discontinued omeprazole-several months ago.  No history of unintentional weight loss fevers chills night sweats melena hematochezia dysuria or hematuria.  Appetite has been mildly decreased.  Does have history of colonoscopy in 2017    Elevated blood pressure noted on presentation today.  No prior history, no current Rx.    Patient Active Problem List   Diagnosis     Hyperlipidemia with target LDL less than 130     Essential hypertension     Gastroesophageal reflux disease without esophagitis     Prediabetes     Past Surgical History:   Procedure Laterality Date     COLONOSCOPY N/A 6/8/2017    Procedure: COLONOSCOPY;  COLONOSCOPY;  Surgeon: Alpesh Clark MD;  Location:  GI     NO HISTORY OF SURGERY         Social History     Tobacco Use     Smoking status: Never Smoker     Smokeless tobacco: Never Used   Substance Use Topics     Alcohol use: No     Family History   Problem Relation Age of Onset     Diabetes No family hx of      Heart Disease No family hx of      Thyroid Disease No family hx of          Current Outpatient Medications   Medication Sig Dispense Refill     lisinopril (ZESTRIL) 10 MG tablet Take 1 tablet (10 mg) by mouth daily 30 tablet 1     omeprazole (PRILOSEC) 20 MG DR capsule Take 1 capsule (20 mg) by mouth daily 30 capsule 1       ROS: The following systems have been completely reviewed and are negative except as noted in the HPI: CONSTITUTIONAL,  "HEAD AND NECK, CARDIOVASCULAR, PULMONARY, GASTROINTESTINAL, GENITOURINARY    OBJECTIVE:                                                    BP (!) 164/87 (Cuff Size: Adult Large)   Pulse 78   Temp 98.9  F (37.2  C) (Tympanic)   Resp 16   Ht 1.549 m (5' 1\")   Wt 72.6 kg (160 lb)   SpO2 96%   BMI 30.23 kg/m   Body mass index is 30.23 kg/m .  GENERAL:  alert,  no distress  HENT: oropharynx-normal  NECK: no tenderness, no adenopathy  RESP: lungs clear to auscultation - no rales, no rhonchi, no wheezes  CV: regular rates and rhythm, normal S1 S2, no S3 or S4 and no murmur, no click or rub -  ABDOMEN: Mild tenderness to palpation, epigastric/left-sided without rebound or peritoneal signs without palpable mass  MS: extremities- no edema       ASSESSMENT/PLAN:                                                        ICD-10-CM    1. Epigastric pain  R10.13 Comprehensive metabolic panel     CBC with platelets differential     Lipase     CT Abdomen Pelvis w Contrast     omeprazole (PRILOSEC) 20 MG DR capsule     Recurrent abdominal pain differential consider GERD, PUD, biliary colic, IBD, IBS, pancreatitis, lower lobe pneumonia.  Additional lab work today and plan for CT abdomen/pelvis     2. Essential hypertension  I10 lisinopril (ZESTRIL) 10 MG tablet     Blood pressure as noted start lisinopril return for blood pressure follow-up next few weeks         Duke Zhu MD  [facility]       "

## 2020-07-16 NOTE — PATIENT INSTRUCTIONS
1-stomach pain.   Have ordered CT scan at [facility] (they will call)     Radiology [phone].    Checking labwork      Resume omeprazole (reduces stomach acid)    2- blood pressure.  Start lisinopril 10mg daily and return for follow-up visit and BP in 3-4 weeks

## 2020-07-16 NOTE — LETTER
July 20, 2020      Marck Hernandez  [address] [address] [facility] [address]        Dear ,    We are writing to inform you of your test results.    The blood counts are normal. Your blood chemistries, blood sugar, kidney and liver function tests are normal. Please follow-up after the CT scan has been completed.     Resulted Orders   Comprehensive metabolic panel   Result Value Ref Range    Sodium 140 133 - 144 mmol/L    Potassium 3.9 3.4 - 5.3 mmol/L    Chloride 107 94 - 109 mmol/L    Carbon Dioxide 26 20 - 32 mmol/L    Anion Gap 7 3 - 14 mmol/L    Glucose 91 70 - 99 mg/dL    Urea Nitrogen 9 7 - 30 mg/dL    Creatinine 0.60 0.52 - 1.04 mg/dL    GFR Estimate >90 >60 mL/min/[1.73_m2]      Comment:      Non  GFR Calc  Starting 12/18/2018, serum creatinine based estimated GFR (eGFR) will be   calculated using the Chronic Kidney Disease Epidemiology Collaboration   (CKD-EPI) equation.      GFR Estimate If Black >90 >60 mL/min/[1.73_m2]      Comment:       GFR Calc  Starting 12/18/2018, serum creatinine based estimated GFR (eGFR) will be   calculated using the Chronic Kidney Disease Epidemiology Collaboration   (CKD-EPI) equation.      Calcium 9.0 8.5 - 10.1 mg/dL    Bilirubin Total 0.3 0.2 - 1.3 mg/dL    Albumin 4.0 3.4 - 5.0 g/dL    Protein Total 8.2 6.8 - 8.8 g/dL    Alkaline Phosphatase 76 40 - 150 U/L    ALT 22 0 - 50 U/L    AST 11 0 - 45 U/L   CBC with platelets differential   Result Value Ref Range    WBC 4.7 4.0 - 11.0 10e9/L    RBC Count 4.71 3.8 - 5.2 10e12/L    Hemoglobin 13.5 11.7 - 15.7 g/dL    Hematocrit 40.5 35.0 - 47.0 %    MCV 86 78 - 100 fl    MCH 28.7 26.5 - 33.0 pg    MCHC 33.3 31.5 - 36.5 g/dL    RDW 12.7 10.0 - 15.0 %    Platelet Count 306 150 - 450 10e9/L    % Neutrophils 34.3 %    % Lymphocytes 53.7 %    % Monocytes 7.6 %    % Eosinophils 4.0 %    % Basophils 0.4 %    Absolute Neutrophil 1.6 1.6 - 8.3 10e9/L    Absolute Lymphocytes 2.5 0.8 - 5.3 10e9/L    Absolute  Monocytes 0.4 0.0 - 1.3 10e9/L    Absolute Eosinophils 0.2 0.0 - 0.7 10e9/L    Absolute Basophils 0.0 0.0 - 0.2 10e9/L    Diff Method Automated Method    Lipase   Result Value Ref Range    Lipase 44 (L) 73 - 393 U/L       If you have any questions or concerns, please call the clinic at the number listed above.       Sincerely,        Duke Zhu MD, MD

## 2020-07-17 LAB
ALBUMIN SERPL-MCNC: 4 G/DL (ref 3.4–5)
ALP SERPL-CCNC: 76 U/L (ref 40–150)
ALT SERPL W P-5'-P-CCNC: 22 U/L (ref 0–50)
ANION GAP SERPL CALCULATED.3IONS-SCNC: 7 MMOL/L (ref 3–14)
AST SERPL W P-5'-P-CCNC: 11 U/L (ref 0–45)
BILIRUB SERPL-MCNC: 0.3 MG/DL (ref 0.2–1.3)
BUN SERPL-MCNC: 9 MG/DL (ref 7–30)
CALCIUM SERPL-MCNC: 9 MG/DL (ref 8.5–10.1)
CHLORIDE SERPL-SCNC: 107 MMOL/L (ref 94–109)
CO2 SERPL-SCNC: 26 MMOL/L (ref 20–32)
CREAT SERPL-MCNC: 0.6 MG/DL (ref 0.52–1.04)
GFR SERPL CREATININE-BSD FRML MDRD: >90 ML/MIN/{1.73_M2}
GLUCOSE SERPL-MCNC: 91 MG/DL (ref 70–99)
POTASSIUM SERPL-SCNC: 3.9 MMOL/L (ref 3.4–5.3)
PROT SERPL-MCNC: 8.2 G/DL (ref 6.8–8.8)
SODIUM SERPL-SCNC: 140 MMOL/L (ref 133–144)

## 2020-08-10 ENCOUNTER — ANCILLARY PROCEDURE (OUTPATIENT)
Dept: CT IMAGING | Facility: CLINIC | Age: 70
End: 2020-08-10
Attending: INTERNAL MEDICINE
Payer: COMMERCIAL

## 2020-08-10 DIAGNOSIS — R10.13 EPIGASTRIC PAIN: ICD-10-CM

## 2020-08-10 PROCEDURE — 74177 CT ABD & PELVIS W/CONTRAST: CPT | Mod: TC

## 2020-08-10 RX ORDER — IOPAMIDOL 755 MG/ML
99 INJECTION, SOLUTION INTRAVASCULAR ONCE
Status: COMPLETED | OUTPATIENT
Start: 2020-08-10 | End: 2020-08-10

## 2020-08-10 RX ADMIN — IOPAMIDOL 99 ML: 755 INJECTION, SOLUTION INTRAVASCULAR at 14:05

## 2020-08-12 ENCOUNTER — TELEPHONE (OUTPATIENT)
Dept: PEDIATRICS | Facility: CLINIC | Age: 70
End: 2020-08-12

## 2020-08-12 NOTE — TELEPHONE ENCOUNTER
Patient's son returned call, notified of lab results.     He said thank you and would follow-up with provider during appointment with Dr. Pace and the patient for next steps.    Ashley Raza, EMT at 4:04 PM on August 12, 2020  [facility] Health Guide   [phone]

## 2020-08-12 NOTE — TELEPHONE ENCOUNTER
Test Results  Who is calling?:  Son Ash (consent to communicate on file)  Who ordered the test:  Dr. Zhu  Type of test: Lab and CT  Date of test:  8/10  Where was the test performed:  [facility]  What are your questions/concerns?:  Concerned something is wrong  Okay to leave a detailed message?:  Yes  [phone]

## 2020-08-12 NOTE — TELEPHONE ENCOUNTER
Called patient's son, no answer, requested call back at direct line.     Ashley Raza, EMT at 3:35 PM on August 12, 2020  [facility] Health Guide   [phone]

## 2020-08-16 PROBLEM — R59.0 INTRA-ABDOMINAL LYMPHADENOPATHY: Status: ACTIVE | Noted: 2020-08-16

## 2020-08-19 ENCOUNTER — VIRTUAL VISIT (OUTPATIENT)
Dept: PEDIATRICS | Facility: CLINIC | Age: 70
End: 2020-08-19
Payer: COMMERCIAL

## 2020-08-19 DIAGNOSIS — I10 ESSENTIAL HYPERTENSION: ICD-10-CM

## 2020-08-19 DIAGNOSIS — K21.9 GASTROESOPHAGEAL REFLUX DISEASE WITHOUT ESOPHAGITIS: Primary | ICD-10-CM

## 2020-08-19 DIAGNOSIS — R59.0 INTRA-ABDOMINAL LYMPHADENOPATHY: ICD-10-CM

## 2020-08-19 PROCEDURE — 99214 OFFICE O/P EST MOD 30 MIN: CPT | Mod: 95 | Performed by: INTERNAL MEDICINE

## 2020-08-19 RX ORDER — LISINOPRIL 10 MG/1
10 TABLET ORAL DAILY
Qty: 90 TABLET | Refills: 3 | Status: SHIPPED | OUTPATIENT
Start: 2020-08-19 | End: 2020-10-19

## 2020-08-19 NOTE — PROGRESS NOTES
"Marck Hernandez is a 70 year old female who is being evaluated via a billable telephone visit.      The patient has been notified of following:     \"This telephone visit will be conducted via a call between you and your physician/provider. We have found that certain health care needs can be provided without the need for a physical exam.  This service lets us provide the care you need with a short phone conversation.  If a prescription is necessary we can send it directly to your pharmacy.  If lab work is needed we can place an order for that and you can then stop by our lab to have the test done at a later time.    Telephone visits are billed at different rates depending on your insurance coverage. During this emergency period, for some insurers they may be billed the same as an in-person visit.  Please reach out to your insurance provider with any questions.    If during the course of the call the physician/provider feels a telephone visit is not appropriate, you will not be charged for this service.\"    Patient has given verbal consent for Telephone visit?  Yes    What phone number would you like to be contacted at? [phone]    How would you like to obtain your AVS? Mail a copy    Subjective     Marck Hernandez is a 70 year old female who presents via phone visit today for the following health issues:    HPI     70-year-old woman with a history of hypertension and chronic abdominal pain presents today for follow-up, telephone visit today with her son and an [facility] .  Since our most recent visit patient has completed a recent abdominal CT, results reviewed today.  CT with contrast demonstrates no significant abnormalities apart from prominent appearing mesenteric and retroperitoneal lymph nodes.  Patient states that since starting omeprazole recommended at our most recent visit, her abdominal symptoms have improved significantly.  Previously described migratory periumbilical pain radiating towards her " lower chest.  Denies dysphagia nausea melena hematochezia diarrhea or constipation.    Hypertension.  Continues lisinopril started at her most recent visit.  Tolerating lisinopril without cough or other side effects.    Patient Active Problem List   Diagnosis     Hyperlipidemia with target LDL less than 130     Essential hypertension     Gastroesophageal reflux disease without esophagitis     Prediabetes     Intra-abdominal lymphadenopathy     Past Medical History:   Diagnosis Date     GERD (gastroesophageal reflux disease)      HTN (hypertension)     Previously treated in [facility] in [place], not on medication since approx 2014       Past Surgical History:   Procedure Laterality Date     COLONOSCOPY N/A 6/8/2017    Procedure: COLONOSCOPY;  COLONOSCOPY;  Surgeon: Alpesh Clark MD;  Location:  GI     NO HISTORY OF SURGERY         Family History   Problem Relation Age of Onset     Diabetes No family hx of      Heart Disease No family hx of      Thyroid Disease No family hx of        ALLERGIES   No Known Allergies    Current Outpatient Medications   Medication Sig Dispense Refill     lisinopril (ZESTRIL) 10 MG tablet Take 1 tablet (10 mg) by mouth daily 90 tablet 3     omeprazole (PRILOSEC) 20 MG DR capsule Take 1 capsule (20 mg) by mouth daily as needed 90 capsule 3        Review of Systems   Constitutional, HEENT, cardiovascular, pulmonary, gi and gu systems are negative, except as otherwise noted.       Objective          Vitals:  No vitals were obtained today due to virtual visit.    PSYCH: Alert and oriented times 3; coherent speech, normal   rate and volume, able to articulate logical thoughts  RESP: No cough, no audible wheezing, able to talk in full sentences  Remainder of exam unable to be completed due to telephone visits      Assessment/Plan:      ICD-10-CM    1. Gastroesophageal reflux disease without esophagitis  K21.9 omeprazole (PRILOSEC) 20 MG DR capsule      Follow-up chronic abdominal pain.   Symptoms have resolved on omeprazole/likely GERD     2. Essential hypertension  I10 lisinopril (ZESTRIL) 10 MG tablet       Continue lisinopril         3. Intra-abdominal lymphadenopathy  R59.0 CT Abdomen Pelvis w Contrast      CT reviewed-Prominent retroperitoneal nodes, recommend follow-up CT in 3 months     Phone call duration:  25 minutes    (telephone/virtual visit somewhat challenging.  Rec office visit follow-up(rather than virtual)- patient and son agree and will return for follow-up in 2 months.)    Duke Zhu MD

## 2020-08-25 ENCOUNTER — APPOINTMENT (OUTPATIENT)
Dept: INTERPRETER SERVICES | Facility: CLINIC | Age: 70
End: 2020-08-25
Payer: COMMERCIAL

## 2020-10-19 ENCOUNTER — OFFICE VISIT (OUTPATIENT)
Dept: PEDIATRICS | Facility: CLINIC | Age: 70
End: 2020-10-19
Payer: COMMERCIAL

## 2020-10-19 ENCOUNTER — APPOINTMENT (OUTPATIENT)
Dept: INTERPRETER SERVICES | Facility: CLINIC | Age: 70
End: 2020-10-19
Payer: COMMERCIAL

## 2020-10-19 VITALS
TEMPERATURE: 97.5 F | OXYGEN SATURATION: 100 % | WEIGHT: 156.6 LBS | HEART RATE: 90 BPM | DIASTOLIC BLOOD PRESSURE: 92 MMHG | SYSTOLIC BLOOD PRESSURE: 166 MMHG | BODY MASS INDEX: 29.59 KG/M2

## 2020-10-19 DIAGNOSIS — Z23 NEED FOR INFLUENZA VACCINATION: ICD-10-CM

## 2020-10-19 DIAGNOSIS — I10 ESSENTIAL HYPERTENSION: Primary | ICD-10-CM

## 2020-10-19 DIAGNOSIS — R05.9 COUGH: ICD-10-CM

## 2020-10-19 DIAGNOSIS — R10.13 EPIGASTRIC PAIN: ICD-10-CM

## 2020-10-19 DIAGNOSIS — R59.0 INTRA-ABDOMINAL LYMPHADENOPATHY: ICD-10-CM

## 2020-10-19 PROCEDURE — 99214 OFFICE O/P EST MOD 30 MIN: CPT | Performed by: INTERNAL MEDICINE

## 2020-10-19 PROCEDURE — 87338 HPYLORI STOOL AG IA: CPT | Performed by: INTERNAL MEDICINE

## 2020-10-19 RX ORDER — IRBESARTAN 150 MG/1
150 TABLET ORAL AT BEDTIME
Qty: 90 TABLET | Refills: 1 | Status: SHIPPED | OUTPATIENT
Start: 2020-10-19 | End: 2021-11-04

## 2020-10-19 NOTE — PROGRESS NOTES
Subjective     Marck Hernandez is a 70 year old female who presents to clinic today for the following health issues:    History of Present Illness       She eats 2-3 servings of fruits and vegetables daily.She consumes 2 sweetened beverage(s) daily.She exercises with enough effort to increase her heart rate 10 to 19 minutes per day.  She exercises with enough effort to increase her heart rate 3 or less days per week.   She is taking medications regularly.           Hypertension Follow-up      Do you check your blood pressure regularly outside of the clinic? No, only at the clinic    Are you following a low salt diet? No, no acidic or spicy food     Are your blood pressures ever more than 140 on the top number (systolic) OR more   than 90 on the bottom number (diastolic), for example 140/90? Yes at the clinic     LUQ pain  Onset/Duration: chronic  Description: stomach pain   Intensity: moderate  Progression of Symptoms: improving but has started coughing in the past month  Accompanying Signs & Symptoms:  Does it feel like food gets stuck or trouble swallowing: YES  Nausea: no  Vomiting (bloody?): no  Abdominal Pain: YES  Black-Tarry stools: no  Bloody stools: no  History:  Previous similar episodes: no  Previous ulcers: no  Precipitating factors:   Caffeine use: no  Alcohol use: no  NSAID/Aspirin use: no  Tobacco use: no  Worse with spicy foods.  Alleviating factors: None  Therapies tried and outcome:             Lifestyle changes: None            Medications: Omeprazole (Prilosec)    Cough:  x1 month, itching in throat as well   Dry cough     Patient Active Problem List   Diagnosis     Hyperlipidemia with target LDL less than 130     Essential hypertension     Gastroesophageal reflux disease without esophagitis     Prediabetes     Intra-abdominal lymphadenopathy     Current Outpatient Medications   Medication Sig Dispense Refill             lisinopril (ZESTRIL) 10 MG tablet Take 1 tablet (10 mg) by mouth daily 90  tablet 3     omeprazole (PRILOSEC) 20 MG DR capsule Take 1 capsule (20 mg) by mouth daily as needed 90 capsule 3        Review of Systems   Constitutional, HEENT, cardiovascular, pulmonary, gi and gu systems are negative, except as otherwise noted.      Objective    BP (!) 166/92 (BP Location: Right arm, Patient Position: Sitting, Cuff Size: Adult Regular)   Pulse 90   Temp 97.5  F (36.4  C) (Tympanic)   Wt 71 kg (156 lb 9.6 oz)   SpO2 100%   BMI 29.59 kg/m    Body mass index is 29.59 kg/m .  Physical Exam   GENERAL: alert and no distress  NECK: no adenopathy, no asymmetry, masses, or scars and thyroid normal to palpation  RESP: lungs clear to auscultation - no rales, rhonchi or wheezes  CV: regular rate and rhythm, normal S1 S2, no S3 or S4, no murmur  ABDOMEN: soft, , mild LUQ tenderness without mass, no hepatosplenomegaly  MS: no gross musculoskeletal defects noted, no edema  NEURO: Normal strength and tone, mentation intact and speech normal  PSYCH: mentation appears normal, affect normal/bright    ASSESSMENT:      ICD-10-CM    1. Essential hypertension  I10 irbesartan (AVAPRO) 150 MG tablet      Inadequate control, side effects secondary to lisinopril.  Stop lisinopril start irbesartan 150 mg.  Family will start checking home blood pressures and forward results in the next 1-2 weeks     2. Intra-abdominal lymphadenopathy  R59.0 CT Abdomen Pelvis w Contrast      Recent CT abdomen pelvis with contrast reviewed with the patient and her son today negative apart from intra-abdominal adenopathy.  Patient will return for 3-month follow-up CT in November.     3. Epigastric pain  R10.13 H Pylori antigen stool     Persistent left upper quadrant/epigastric discomfort.  Improved on omeprazole.  Prior history of H. pylori.  Plan to recheck stool H. pylori antigen.   If symptoms persist, discussed EGD     4. Cough  R05  suspect cough secondary to lisinopril-discontinued     5. Need for influenza vaccination  Z23 FLU  VACCINE, 3 YRS +, IM (FLUZONE)     ADMIN INFLUENZA VIRUS VAC     Duke Zhu MD

## 2020-10-19 NOTE — PATIENT INSTRUCTIONS
1- stop lisinopril.  (cough should resolve off lisinopril)   Start irbesartan for blood pressure             Please obtain some additional home readings and record over the next few weeks.  Goal blood pressure is less than 140/90.  If your home blood pressure cuff is out of date, the OMRON brand units are reasonable (available on [employer] or at [employer] or [employer]). We generally recommend purchasing an arm cuff automatic unit.        We recommend checking your pressure in the morning after you have been awake for 15 to 30 minutes.  Remember to sit quietly for about 10 minutes before taking the reading and take 2 or 3 readings and take the average.   Please forward your results in the next 2 weeks.    Goal less than 140/90    2- enlarged lymph nodes.  [facility] radiology will call you to schedule the repeat CT scan for December

## 2020-10-20 DIAGNOSIS — R10.13 EPIGASTRIC PAIN: ICD-10-CM

## 2020-10-20 PROCEDURE — 90471 IMMUNIZATION ADMIN: CPT | Performed by: INTERNAL MEDICINE

## 2020-10-20 PROCEDURE — 90662 IIV NO PRSV INCREASED AG IM: CPT | Performed by: INTERNAL MEDICINE

## 2020-10-21 LAB — H PYLORI AG STL QL IA: NEGATIVE

## 2020-10-26 ENCOUNTER — ANCILLARY PROCEDURE (OUTPATIENT)
Dept: CT IMAGING | Facility: CLINIC | Age: 70
End: 2020-10-26
Attending: INTERNAL MEDICINE
Payer: COMMERCIAL

## 2020-10-26 DIAGNOSIS — R59.0 INTRA-ABDOMINAL LYMPHADENOPATHY: ICD-10-CM

## 2020-10-26 PROCEDURE — 74177 CT ABD & PELVIS W/CONTRAST: CPT | Mod: TC

## 2020-10-26 RX ORDER — IOPAMIDOL 755 MG/ML
96 INJECTION, SOLUTION INTRAVASCULAR ONCE
Status: COMPLETED | OUTPATIENT
Start: 2020-10-26 | End: 2020-10-26

## 2020-10-26 RX ADMIN — IOPAMIDOL 96 ML: 755 INJECTION, SOLUTION INTRAVASCULAR at 08:30

## 2020-11-23 ENCOUNTER — TELEPHONE (OUTPATIENT)
Dept: PEDIATRICS | Facility: CLINIC | Age: 70
End: 2020-11-23
Payer: COMMERCIAL

## 2020-11-23 ENCOUNTER — APPOINTMENT (OUTPATIENT)
Dept: INTERPRETER SERVICES | Facility: CLINIC | Age: 70
End: 2020-11-23
Payer: COMMERCIAL

## 2020-11-23 NOTE — TELEPHONE ENCOUNTER
Called via female : conrad lombardi. LVM requesting a call back.     Ashley Raza, EMT at 12:17 PM on November 23, 2020  [facility] Health Guide   [phone]

## 2021-11-03 DIAGNOSIS — I10 ESSENTIAL HYPERTENSION: ICD-10-CM

## 2021-11-03 NOTE — TELEPHONE ENCOUNTER
Patient's son called to put in a refill for irbesartan (AVAPRO) 150 MG tablet, he also made an appointment for her to see Dr. Zhu for medication recheck on 01/10/2022. At this time she is out of the medication.  Kenia Almonte   [facility]  Central Scheduler

## 2021-11-04 RX ORDER — IRBESARTAN 150 MG/1
150 TABLET ORAL AT BEDTIME
Qty: 90 TABLET | Refills: 1 | Status: SHIPPED | OUTPATIENT
Start: 2021-11-04 | End: 2022-08-31

## 2021-11-04 NOTE — TELEPHONE ENCOUNTER
Routing refill request to provider for review/approval because:  Labs not current:  YULI, JIE  A break in medication  Patient needs to be seen because it has been more than 1 year since last office visit.  Elevated BP    Debra Lyman RN on 11/4/2021 at 8:59 AM

## 2022-08-31 ENCOUNTER — OFFICE VISIT (OUTPATIENT)
Dept: PEDIATRICS | Facility: CLINIC | Age: 72
End: 2022-08-31
Payer: COMMERCIAL

## 2022-08-31 VITALS
SYSTOLIC BLOOD PRESSURE: 136 MMHG | DIASTOLIC BLOOD PRESSURE: 84 MMHG | RESPIRATION RATE: 16 BRPM | TEMPERATURE: 97.5 F | HEART RATE: 82 BPM | OXYGEN SATURATION: 100 % | BODY MASS INDEX: 33.42 KG/M2 | WEIGHT: 177 LBS | HEIGHT: 61 IN

## 2022-08-31 DIAGNOSIS — K21.9 GASTROESOPHAGEAL REFLUX DISEASE WITHOUT ESOPHAGITIS: ICD-10-CM

## 2022-08-31 DIAGNOSIS — Z00.00 HEALTH CARE MAINTENANCE: ICD-10-CM

## 2022-08-31 DIAGNOSIS — E78.5 HYPERLIPIDEMIA WITH TARGET LDL LESS THAN 130: ICD-10-CM

## 2022-08-31 DIAGNOSIS — Z11.59 NEED FOR HEPATITIS C SCREENING TEST: ICD-10-CM

## 2022-08-31 DIAGNOSIS — Z12.31 VISIT FOR SCREENING MAMMOGRAM: ICD-10-CM

## 2022-08-31 DIAGNOSIS — I10 ESSENTIAL HYPERTENSION: Primary | ICD-10-CM

## 2022-08-31 LAB
ALBUMIN SERPL-MCNC: 3.8 G/DL (ref 3.4–5)
ALP SERPL-CCNC: 76 U/L (ref 40–150)
ALT SERPL W P-5'-P-CCNC: 20 U/L (ref 0–50)
ANION GAP SERPL CALCULATED.3IONS-SCNC: 8 MMOL/L (ref 3–14)
AST SERPL W P-5'-P-CCNC: 14 U/L (ref 0–45)
BILIRUB SERPL-MCNC: 0.2 MG/DL (ref 0.2–1.3)
BUN SERPL-MCNC: 11 MG/DL (ref 7–30)
CALCIUM SERPL-MCNC: 8.9 MG/DL (ref 8.5–10.1)
CHLORIDE BLD-SCNC: 106 MMOL/L (ref 94–109)
CHOLEST SERPL-MCNC: 211 MG/DL
CO2 SERPL-SCNC: 26 MMOL/L (ref 20–32)
CREAT SERPL-MCNC: 0.6 MG/DL (ref 0.52–1.04)
FASTING STATUS PATIENT QL REPORTED: ABNORMAL
GFR SERPL CREATININE-BSD FRML MDRD: >90 ML/MIN/1.73M2
GLUCOSE BLD-MCNC: 105 MG/DL (ref 70–99)
HBA1C MFR BLD: 6.3 % (ref 0–5.6)
HCV AB SERPL QL IA: NONREACTIVE
HDLC SERPL-MCNC: 34 MG/DL
LDLC SERPL CALC-MCNC: 149 MG/DL
NONHDLC SERPL-MCNC: 177 MG/DL
POTASSIUM BLD-SCNC: 4.3 MMOL/L (ref 3.4–5.3)
PROT SERPL-MCNC: 7.9 G/DL (ref 6.8–8.8)
SODIUM SERPL-SCNC: 140 MMOL/L (ref 133–144)
TRIGL SERPL-MCNC: 139 MG/DL

## 2022-08-31 PROCEDURE — 90471 IMMUNIZATION ADMIN: CPT | Performed by: STUDENT IN AN ORGANIZED HEALTH CARE EDUCATION/TRAINING PROGRAM

## 2022-08-31 PROCEDURE — 86803 HEPATITIS C AB TEST: CPT | Performed by: STUDENT IN AN ORGANIZED HEALTH CARE EDUCATION/TRAINING PROGRAM

## 2022-08-31 PROCEDURE — 99214 OFFICE O/P EST MOD 30 MIN: CPT | Mod: GC | Performed by: STUDENT IN AN ORGANIZED HEALTH CARE EDUCATION/TRAINING PROGRAM

## 2022-08-31 PROCEDURE — 83036 HEMOGLOBIN GLYCOSYLATED A1C: CPT | Performed by: STUDENT IN AN ORGANIZED HEALTH CARE EDUCATION/TRAINING PROGRAM

## 2022-08-31 PROCEDURE — 90677 PCV20 VACCINE IM: CPT | Performed by: STUDENT IN AN ORGANIZED HEALTH CARE EDUCATION/TRAINING PROGRAM

## 2022-08-31 PROCEDURE — 36415 COLL VENOUS BLD VENIPUNCTURE: CPT | Performed by: STUDENT IN AN ORGANIZED HEALTH CARE EDUCATION/TRAINING PROGRAM

## 2022-08-31 PROCEDURE — 80053 COMPREHEN METABOLIC PANEL: CPT | Performed by: STUDENT IN AN ORGANIZED HEALTH CARE EDUCATION/TRAINING PROGRAM

## 2022-08-31 PROCEDURE — 80061 LIPID PANEL: CPT | Performed by: STUDENT IN AN ORGANIZED HEALTH CARE EDUCATION/TRAINING PROGRAM

## 2022-08-31 RX ORDER — IRBESARTAN 150 MG/1
150 TABLET ORAL AT BEDTIME
Qty: 90 TABLET | Refills: 3 | Status: SHIPPED | OUTPATIENT
Start: 2022-08-31 | End: 2023-07-19

## 2022-08-31 ASSESSMENT — PAIN SCALES - GENERAL: PAINLEVEL: NO PAIN (0)

## 2022-08-31 NOTE — PROGRESS NOTES
"Assessment & Plan     Essential hypertension  Patient with longstanding history of hypertension well-controlled on current regiment.  She has been out of medications for several months now, will refill them today.  - Comprehensive metabolic panel (BMP + Alb, Alk Phos, ALT, AST, Total. Bili, TP)  - irbesartan (AVAPRO) 150 MG tablet; Take 1 tablet (150 mg) by mouth At Bedtime    Hyperlipidemia with target LDL less than 130  - Lipid panel reflex to direct LDL Fasting    Need for hepatitis C screening test  - Hepatitis C Screen Reflex to HCV RNA Quant and Genotype    Visit for screening mammogram  - MA SCREENING DIGITAL BILAT - Future  (s+30); Future    Gastroesophageal reflux disease without esophagitis  Describes epigastric abdominal pain similar to what she has had in the past.  Feels like she wants this treated, has been on a PPI in the past but has not gotten that refilled for nearly a year.  Extensive work-up for abdominal pain 2 years ago which was largely unremarkable.  Will restart PPI at this time.  - omeprazole (PRILOSEC) 20 MG DR capsule; Take 1 capsule (20 mg) by mouth daily as needed    Health care maintenance  - DEXA HIP/PELVIS/SPINE - Future; Future  - Hemoglobin A1c    BMI:   Estimated body mass index is 33.44 kg/m  as calculated from the following:    Height as of this encounter: 1.549 m (5' 1\").    Weight as of this encounter: 80.3 kg (177 lb).   Weight management plan: Discussed healthy diet and exercise guidelines    Return in about 1 year (around 8/31/2023) for Routine preventive.    Gisela Milian MD  [facility] IBAN Acharya is a 72 year old, presenting for the following health issues:  Follow Up    Is taking irbesartan for blood pressure which is well controlled today, she does not have a blood pressure cuff at home. She has not been taking this for the past several months as her prescription ran out.     Continues to have heartburn in the setting of also not " "having her PPI for several months. Feels that the medication was helpful in the past.     Having difficulty breathing and using a inhaler which she got at an emergency room for a viral URI/cough several times a day. She is not feeling short of breath.     Can hear her heartbeat in hear hears sometimes. She is 'shaking at night' with very mild associated diaphoresis for the past two weeks. This only happens intermittently, 3-4d/week. Has not had weight loss that she has noticed. No fevers, no sick contacts, no URI symptoms, no diarrhea or vomiting.     HPI   Hyperlipidemia Follow-Up      Are you regularly taking any medication or supplement to lower your cholesterol?   Yes- irbesartan    Are you having muscle aches or other side effects that you think could be caused by your cholesterol lowering medication?  No    Hypertension Follow-up    Do you check your blood pressure regularly outside of the clinic? No     Are you following a low salt diet? Yes    Are your blood pressures ever more than 140 on the top number (systolic) OR more   than 90 on the bottom number (diastolic), for example 140/90? Yes    How many servings of fruits and vegetables do you eat daily?  2-3    On average, how many sweetened beverages do you drink each day (Examples: soda, juice, sweet tea, etc.  Do NOT count diet or artificially sweetened beverages)?   0    How many days per week do you exercise enough to make your heart beat faster? 7    How many minutes a day do you exercise enough to make your heart beat faster? 30 - 60    How many days per week do you miss taking your medication? 0     Review of Systems   Constitutional, HEENT, cardiovascular, pulmonary, gi and gu systems are negative, except as otherwise noted.      Objective    /84   Pulse 82   Temp 97.5  F (36.4  C)   Resp 16   Ht 1.549 m (5' 1\")   Wt 80.3 kg (177 lb)   LMP 08/31/2022   SpO2 100%   Breastfeeding No   BMI 33.44 kg/m    Body mass index is 33.44 kg/m . "     Physical Exam   GENERAL: healthy, alert and no distress, obese  EYES: Eyes grossly normal to inspection, PERRL and conjunctivae and sclerae normal  HENT: ear canals and TM's normal, nose and mouth without ulcers or lesions, missing several teeth   NECK: no adenopathy, no asymmetry, masses, or scars and thyroid normal to palpation  RESP: lungs clear to auscultation - no rales, rhonchi or wheezes  CV: regular rate and rhythm, normal S1 S2, no S3 or S4, no murmur  ABDOMEN: soft, nontender, no hepatosplenomegaly, no masses and bowel sounds normal  MS: no gross musculoskeletal defects noted, no edema  SKIN: no suspicious lesions or rashes  NEURO: Normal strength and tone, mentation intact and speech normal  PSYCH: mentation appears normal, affect normal/bright    ----- Service Performed and Documented by Resident or Fellow ------        ===========  STAFF NOTE:  Patient seen with resident physician today.  I was physically present during key portions of the visit and participated in the evaluation and management of the patient today.     Duke Zhu MD

## 2022-08-31 NOTE — LETTER
September 2, 2022      Marck Hernandez  [address] [place] [address]        Dear ,    We are writing to inform you of your test results.    Regarding the labs from your most recent visit:       Your blood chemistries, kidney and liver function tests are normal.   The Hepatitis C screening test is negative.   Your labwork is consistent with Prediabetes, defined as a fasting blood sugar between 100 and 125. Prediabetes puts you at risk for Adult Onset (type 2) Diabetes.  You can prevent or delay the development of Type 2 Diabetes through lifestyle changes including modest weight loss and regular exercise.  Reducing your weight through daily calorie restriction and modest physical activity for 30 minutes every day can often return elevated blood glucose levels to the normal range.   [facility] offers group class sessions for those newly diagnosed with prediabetes. Diabetes educators will lead discussions and provide you with helpful tips and tools for living well and reducing your risk of developing diabetes.   Pre-diabetes classes are community education classes and do not require a referral.   For questions or more information regarding Pre-diabetes education classes, call the Diabetes Education Team at [phone] ([place] locations) or [phone] ([facility] locations).          Based on your cholesterol results and risk factor profile, you could benefit from a cholesterol-lowering medication to reduce your cardiovascular risk. The 10 year risk of a cardiovascular event is:     The 10-year ASCVD risk score (Celsastefania ANSARI Jr., et al., 2013) is: 17%     Values used to calculate the score:       Age: 72 years       Sex: Female       Is Non- : No       Diabetic: No       Tobacco smoker: No       Systolic Blood Pressure: 136 mmHg       Is BP treated: Yes       HDL Cholesterol: 34 mg/dL       Total Cholesterol: 211 mg/dL     Your LDL cholesterol is high.   High LDL cholesterol  will improve with diet changes --specifically reducing dietary saturated fat (ex. red meat/processed meats, dairy fat, fried foods and foods containing partially hydrogenated oils (trans fats))     Cholesterol-lowering medication is recommended when the 10-year risk is greater than 7.5%  If you agree to starting cholesterol-lowering medication, please contact clinic.       We can send a prescription and schedule repeat fasting labwork after about 2 months on the new medication           Resulted Orders   Hepatitis C Screen Reflex to HCV RNA Quant and Genotype   Result Value Ref Range    Hepatitis C Antibody Nonreactive Nonreactive    Narrative    Assay performance characteristics have not been established for newborns, infants, and children.   Comprehensive metabolic panel (BMP + Alb, Alk Phos, ALT, AST, Total. Bili, TP)   Result Value Ref Range    Sodium 140 133 - 144 mmol/L    Potassium 4.3 3.4 - 5.3 mmol/L    Chloride 106 94 - 109 mmol/L    Carbon Dioxide (CO2) 26 20 - 32 mmol/L    Anion Gap 8 3 - 14 mmol/L    Urea Nitrogen 11 7 - 30 mg/dL    Creatinine 0.60 0.52 - 1.04 mg/dL    Calcium 8.9 8.5 - 10.1 mg/dL    Glucose 105 (H) 70 - 99 mg/dL    Alkaline Phosphatase 76 40 - 150 U/L    AST 14 0 - 45 U/L    ALT 20 0 - 50 U/L    Protein Total 7.9 6.8 - 8.8 g/dL    Albumin 3.8 3.4 - 5.0 g/dL    Bilirubin Total 0.2 0.2 - 1.3 mg/dL    GFR Estimate >90 >60 mL/min/1.73m2      Comment:      Effective December 21, 2021 eGFRcr in adults is calculated using the 2021 CKD-EPI creatinine equation which includes age and gender (Brian be al., NEJ, DOI: 10.1056/OOOPuj4413212)   Lipid panel reflex to direct LDL Fasting   Result Value Ref Range    Cholesterol 211 (H) <200 mg/dL    Triglycerides 139 <150 mg/dL    Direct Measure HDL 34 (L) >=50 mg/dL    LDL Cholesterol Calculated 149 (H) <=100 mg/dL    Non HDL Cholesterol 177 (H) <130 mg/dL    Patient Fasting > 8hrs? Unknown     Narrative    Cholesterol  Desirable:  <200  mg/dL    Triglycerides  Normal:  Less than 150 mg/dL  Borderline High:  150-199 mg/dL  High:  200-499 mg/dL  Very High:  Greater than or equal to 500 mg/dL    Direct Measure HDL  Female:  Greater than or equal to 50 mg/dL   Male:  Greater than or equal to 40 mg/dL    LDL Cholesterol  Desirable:  <100mg/dL  Above Desirable:  100-129 mg/dL   Borderline High:  130-159 mg/dL   High:  160-189 mg/dL   Very High:  >= 190 mg/dL    Non HDL Cholesterol  Desirable:  130 mg/dL  Above Desirable:  130-159 mg/dL  Borderline High:  160-189 mg/dL  High:  190-219 mg/dL  Very High:  Greater than or equal to 220 mg/dL   Hemoglobin A1c   Result Value Ref Range    Hemoglobin A1C 6.3 (H) 0.0 - 5.6 %      Comment:      Normal <5.7%   Prediabetes 5.7-6.4%    Diabetes 6.5% or higher     Note: Adopted from ADA consensus guidelines.       If you have any questions or concerns, please call the clinic at the number listed above.       Sincerely,      Duke Zhu MD

## 2022-12-16 ENCOUNTER — TELEPHONE (OUTPATIENT)
Dept: PEDIATRICS | Facility: CLINIC | Age: 72
End: 2022-12-16

## 2022-12-16 NOTE — TELEPHONE ENCOUNTER
Called patient via [facility]  to schedule physical, no answer. Unable to leave a message, calls would not go through. Will re-attempt nubia a few days.     Ashley Raza, CHG  [phone]

## 2023-03-18 ENCOUNTER — APPOINTMENT (OUTPATIENT)
Dept: MRI IMAGING | Facility: CLINIC | Age: 73
End: 2023-03-18
Attending: EMERGENCY MEDICINE
Payer: COMMERCIAL

## 2023-03-18 ENCOUNTER — HOSPITAL ENCOUNTER (EMERGENCY)
Facility: CLINIC | Age: 73
Discharge: HOME | End: 2023-03-18
Attending: EMERGENCY MEDICINE | Admitting: EMERGENCY MEDICINE
Payer: COMMERCIAL

## 2023-03-18 VITALS
WEIGHT: 177 LBS | DIASTOLIC BLOOD PRESSURE: 90 MMHG | BODY MASS INDEX: 28.45 KG/M2 | HEART RATE: 71 BPM | TEMPERATURE: 97.3 F | SYSTOLIC BLOOD PRESSURE: 168 MMHG | HEIGHT: 66 IN | OXYGEN SATURATION: 99 % | RESPIRATION RATE: 20 BRPM

## 2023-03-18 DIAGNOSIS — R51.9 LEFT-SIDED HEADACHE: ICD-10-CM

## 2023-03-18 DIAGNOSIS — R42 DIZZINESS: ICD-10-CM

## 2023-03-18 LAB
ALBUMIN UR-MCNC: NEGATIVE MG/DL
ANION GAP SERPL CALCULATED.3IONS-SCNC: 11 MMOL/L (ref 7–15)
APPEARANCE UR: CLEAR
BASOPHILS # BLD AUTO: 0 10E3/UL (ref 0–0.2)
BASOPHILS NFR BLD AUTO: 1 %
BILIRUB UR QL STRIP: NEGATIVE
BUN SERPL-MCNC: 10.4 MG/DL (ref 8–23)
CALCIUM SERPL-MCNC: 9.5 MG/DL (ref 8.8–10.2)
CHLORIDE SERPL-SCNC: 100 MMOL/L (ref 98–107)
COLOR UR AUTO: ABNORMAL
CREAT SERPL-MCNC: 0.55 MG/DL (ref 0.51–0.95)
CRP SERPL-MCNC: <3 MG/L
DEPRECATED HCO3 PLAS-SCNC: 26 MMOL/L (ref 22–29)
EOSINOPHIL # BLD AUTO: 0.2 10E3/UL (ref 0–0.7)
EOSINOPHIL NFR BLD AUTO: 3 %
ERYTHROCYTE [DISTWIDTH] IN BLOOD BY AUTOMATED COUNT: 12.4 % (ref 10–15)
ERYTHROCYTE [SEDIMENTATION RATE] IN BLOOD BY WESTERGREN METHOD: 17 MM/HR (ref 0–30)
FLUAV RNA SPEC QL NAA+PROBE: NEGATIVE
FLUBV RNA RESP QL NAA+PROBE: NEGATIVE
GFR SERPL CREATININE-BSD FRML MDRD: >90 ML/MIN/1.73M2
GLUCOSE SERPL-MCNC: 156 MG/DL (ref 70–99)
GLUCOSE UR STRIP-MCNC: NEGATIVE MG/DL
HCT VFR BLD AUTO: 40.6 % (ref 35–47)
HGB BLD-MCNC: 13.7 G/DL (ref 11.7–15.7)
HGB UR QL STRIP: NEGATIVE
IMM GRANULOCYTES # BLD: 0 10E3/UL
IMM GRANULOCYTES NFR BLD: 0 %
KETONES UR STRIP-MCNC: NEGATIVE MG/DL
LEUKOCYTE ESTERASE UR QL STRIP: ABNORMAL
LYMPHOCYTES # BLD AUTO: 2.6 10E3/UL (ref 0.8–5.3)
LYMPHOCYTES NFR BLD AUTO: 44 %
MCH RBC QN AUTO: 28.9 PG (ref 26.5–33)
MCHC RBC AUTO-ENTMCNC: 33.7 G/DL (ref 31.5–36.5)
MCV RBC AUTO: 86 FL (ref 78–100)
MONOCYTES # BLD AUTO: 0.6 10E3/UL (ref 0–1.3)
MONOCYTES NFR BLD AUTO: 10 %
MUCOUS THREADS #/AREA URNS LPF: PRESENT /LPF
NEUTROPHILS # BLD AUTO: 2.4 10E3/UL (ref 1.6–8.3)
NEUTROPHILS NFR BLD AUTO: 42 %
NITRATE UR QL: NEGATIVE
NRBC # BLD AUTO: 0 10E3/UL
NRBC BLD AUTO-RTO: 0 /100
PH UR STRIP: 6.5 [PH] (ref 5–7)
PLATELET # BLD AUTO: 343 10E3/UL (ref 150–450)
POTASSIUM SERPL-SCNC: 3.8 MMOL/L (ref 3.4–5.3)
RBC # BLD AUTO: 4.74 10E6/UL (ref 3.8–5.2)
RBC URINE: 1 /HPF
RSV RNA SPEC NAA+PROBE: NEGATIVE
SARS-COV-2 RNA RESP QL NAA+PROBE: NEGATIVE
SODIUM SERPL-SCNC: 137 MMOL/L (ref 136–145)
SP GR UR STRIP: 1 (ref 1–1.03)
SQUAMOUS EPITHELIAL: 1 /HPF
TROPONIN T SERPL HS-MCNC: <6 NG/L
UROBILINOGEN UR STRIP-MCNC: NORMAL MG/DL
WBC # BLD AUTO: 5.8 10E3/UL (ref 4–11)
WBC URINE: 1 /HPF

## 2023-03-18 PROCEDURE — 36415 COLL VENOUS BLD VENIPUNCTURE: CPT | Performed by: EMERGENCY MEDICINE

## 2023-03-18 PROCEDURE — 70549 MR ANGIOGRAPH NECK W/O&W/DYE: CPT

## 2023-03-18 PROCEDURE — C9803 HOPD COVID-19 SPEC COLLECT: HCPCS

## 2023-03-18 PROCEDURE — 93005 ELECTROCARDIOGRAM TRACING: CPT

## 2023-03-18 PROCEDURE — 85652 RBC SED RATE AUTOMATED: CPT | Performed by: EMERGENCY MEDICINE

## 2023-03-18 PROCEDURE — 84484 ASSAY OF TROPONIN QUANT: CPT | Performed by: EMERGENCY MEDICINE

## 2023-03-18 PROCEDURE — 85025 COMPLETE CBC W/AUTO DIFF WBC: CPT | Performed by: EMERGENCY MEDICINE

## 2023-03-18 PROCEDURE — 86140 C-REACTIVE PROTEIN: CPT | Performed by: EMERGENCY MEDICINE

## 2023-03-18 PROCEDURE — 80048 BASIC METABOLIC PNL TOTAL CA: CPT | Performed by: EMERGENCY MEDICINE

## 2023-03-18 PROCEDURE — 99285 EMERGENCY DEPT VISIT HI MDM: CPT | Mod: 25,CS

## 2023-03-18 PROCEDURE — 81001 URINALYSIS AUTO W/SCOPE: CPT | Performed by: EMERGENCY MEDICINE

## 2023-03-18 PROCEDURE — 70544 MR ANGIOGRAPHY HEAD W/O DYE: CPT

## 2023-03-18 PROCEDURE — 255N000002 HC RX 255 OP 636: Performed by: EMERGENCY MEDICINE

## 2023-03-18 PROCEDURE — A9585 GADOBUTROL INJECTION: HCPCS | Performed by: EMERGENCY MEDICINE

## 2023-03-18 PROCEDURE — 70553 MRI BRAIN STEM W/O & W/DYE: CPT

## 2023-03-18 PROCEDURE — 250N000013 HC RX MED GY IP 250 OP 250 PS 637: Performed by: EMERGENCY MEDICINE

## 2023-03-18 PROCEDURE — 87637 SARSCOV2&INF A&B&RSV AMP PRB: CPT | Performed by: EMERGENCY MEDICINE

## 2023-03-18 RX ORDER — MECLIZINE HCL 12.5 MG 12.5 MG/1
25 TABLET ORAL 3 TIMES DAILY PRN
Qty: 30 TABLET | Refills: 0 | Status: SHIPPED | OUTPATIENT
Start: 2023-03-18 | End: 2023-08-16

## 2023-03-18 RX ORDER — MECLIZINE HYDROCHLORIDE 25 MG/1
25 TABLET ORAL ONCE
Status: COMPLETED | OUTPATIENT
Start: 2023-03-18 | End: 2023-03-18

## 2023-03-18 RX ORDER — GADOBUTROL 604.72 MG/ML
10 INJECTION INTRAVENOUS ONCE
Status: COMPLETED | OUTPATIENT
Start: 2023-03-18 | End: 2023-03-18

## 2023-03-18 RX ORDER — ACETAMINOPHEN 500 MG
1000 TABLET ORAL ONCE
Status: COMPLETED | OUTPATIENT
Start: 2023-03-18 | End: 2023-03-18

## 2023-03-18 RX ADMIN — ACETAMINOPHEN 1000 MG: 500 TABLET ORAL at 12:04

## 2023-03-18 RX ADMIN — MECLIZINE HYDROCHLORIDE 25 MG: 25 TABLET ORAL at 12:42

## 2023-03-18 RX ADMIN — GADOBUTROL 10 ML: 604.72 INJECTION INTRAVENOUS at 14:17

## 2023-03-18 ASSESSMENT — ENCOUNTER SYMPTOMS
SPEECH DIFFICULTY: 0
HEADACHES: 1
WEAKNESS: 1
FEVER: 0
NUMBNESS: 0
DYSURIA: 0
DIZZINESS: 1
FREQUENCY: 1

## 2023-03-18 ASSESSMENT — ACTIVITIES OF DAILY LIVING (ADL)
ADLS_ACUITY_SCORE: 35
ADLS_ACUITY_SCORE: 35

## 2023-03-18 NOTE — DISCHARGE INSTRUCTIONS
Your EKG and labs are normal.  Your MRI is normal.    Please follow-up with your regular doctor next week regarding your visit to the ER.  Please return to the ER if you develop any new or worsening symptoms.    Discharge Instructions  Headache    You were seen today for a headache. Headaches may be caused by many different things such as muscle tension, sinus inflammation, anxiety and stress, having too little sleep, too much alcohol, some medical conditions or injury. You may have a migraine, which is caused by changes in the blood vessels in your head.  At this time your provider does not find that your headache is a sign of anything dangerous or life-threatening.  However, sometimes the signs of serious illness do not show up right away.      Generally, every Emergency Department visit should have a follow-up clinic visit with either a primary or a specialty clinic/provider. Please follow-up as instructed by your emergency provider today.    Return to the Emergency Department if:  You get a new fever of 100.4 F or higher.  Your headache gets much worse.  You get a stiff neck with your headache.  You get a new headache that is significantly different or worse than headaches you have had before.  You are vomiting (throwing up) and cannot keep food or water down.  You have blurry or double vision or other problems with your eyes.  You have a new weakness on one side of your body.  You have difficulty with balance which is new.  You or your family thinks you are confused.  You have a seizure.    What can I do to help myself?  Pain medications - You may take a pain medication such as Tylenol  (acetaminophen), Advil , Motrin  (ibuprofen) or Aleve  (naproxen).  Take a pain reliever as soon as you notice symptoms.  Starting medications as soon as you start to have symptoms may lessen the amount of pain you have.  Relaxing in a quiet, dark room may help.  Get enough sleep and eat meals regularly.  You may need to watch  for certain foods or other things which may trigger your headaches.  Keeping a journal of your headaches and possible triggers may help you and your primary provider to identify things which you should avoid which may be causing your headaches.  If you were given a prescription for medicine here today, be sure to read all of the information (including the package insert) that comes with your prescription.  This will include important information about the medicine, its side effects, and any warnings that you need to know about.  The pharmacist who fills the prescription can provide more information and answer questions you may have about the medicine.  If you have questions or concerns that the pharmacist cannot address, please call or return to the Emergency Department.   Remember that you can always come back to the Emergency Department if you are not able to see your regular provider in the amount of time listed above, if you get any new symptoms, or if there is anything that worries you.

## 2023-03-18 NOTE — ED TRIAGE NOTES
Pt presents for evaluation of a headache, dizziness and weakness. Dizziness and generalized weakness started Wednesday. Headache started Thursday and is left sided. Has also had left sided tooth pain, top and bottom which has fabricio intermittent for the last month. Denies nausea, emesis or vision changes. Dizziness described as leaning to one side with ambulation. Nothing taken for the headache.

## 2023-03-18 NOTE — ED PROVIDER NOTES
"  History     Chief Complaint:  Headache, Dizziness, and Generalized Weakness     The history is provided by the patient and a relative. The history is limited by a language barrier. A  was used.      Marck Hernandez is a 72 year old female with a history of hypertension and hyperlipidemia who presents with headache, dizziness, and generalized weakness. The patient reports that beginning Wednesday, she developed symptoms of a headache, dizziness, and generalized pain over the body, which began worsening a day ago. She reports that her symptoms first presented as a feeling of heaviness on her head, which then evolved to a room-spinning dizziness and generalized weakness. She reports that her headache occurs intermittently and is located on the left-side of her head. She states that with her dizziness, she feels unbalanced, like she's about to fall. She states that with her headaches, the pain radiates to her eyes and notes that her dizziness worsens when she moves her head and adds that with head movement, she hears a \"shayla-shayla-shayla\" noise in her hears. She states that she has not taken any medications for her head pain. She also states that she has had ittermitent tooth pain for the past month that occurs on the same side as her headache (left). She adds that she feels weak and shaky all over her body and has an aching pain originating in her shoulders that radiates down to her fingers. She adds that she also has pain under her left breast and has been having increased urgency. She notes that she had a subjective fever yesterday. In the ED, she reports that she currently has a headache. She denies any numbness or tingling to arms or legs, speech problems, or recent injuries to the head. She also denies any pain with urination, cough, or runny nose. She does not have a history of migraines.    Independent Historian:   Son    Review of External Notes: n/a     ROS:  Review of Systems   Constitutional: " "Negative for fever.   Cardiovascular: Positive for chest pain.   Genitourinary: Positive for frequency. Negative for dysuria.   Neurological: Positive for dizziness, weakness and headaches. Negative for speech difficulty and numbness.   All other systems reviewed and are negative.    Allergies:  No Known Allergies     Medications:    Irbesartan  Omeprazole    Past Medical History:    GERD  Hypertension  Hyperlipidemia  Prediabetes  Intra-abdominal lymphadenopathy    Past Surgical History:    Surgical history reviewed. No pertinent past surgical history.     Social History:  Presents with son.   PCP: Duke Zhu     Physical Exam     Patient Vitals for the past 24 hrs:   BP Temp Temp src Pulse Resp SpO2 Height Weight   03/18/23 1630 (!) 168/90 -- -- 71 -- -- -- --   03/18/23 1600 (!) 159/89 -- -- 64 -- 99 % -- --   03/18/23 1530 (!) 148/87 -- -- 64 -- 100 % -- --   03/18/23 1143 (!) 174/105 97.3  F (36.3  C) Oral 82 20 99 % 1.676 m (5' 6\") 80.3 kg (177 lb)      Physical Exam  General: Alert, no acute distress  Neuro:  PERRL.  EOMI.  No focal deficits; no pronator drift.  FNF normal.   HEENT:  Moist mucous membranes. Conjunctiva normal. Left temporal scalp tenderness; 2+ temporal artery pulse  CV:  RRR, no m/r/g, skin warm and well perfused  Pulm:  CTAB, no wheezes/ronchi/rales.  No acute distress, breathing comfortably  GI:  Soft, nontender, nondistended.  No rebound or guarding.   MSK:  Moving all extremities.  No focal areas of edema, erythema  Skin:  WWP, no rashes, skin color normal, no diaphoresis  Psych:  Well-appearing, normal affect, regular speech    Emergency Department Course     ECG  ECG taken at 1153, ECG read at 1218  Normal sinus rhythm  Possible left atrial enlargement  Borderline ECG  No significant change  as compared to prior, dated 5/17/17.  Rate 81 bpm. IA interval 152 ms. QRS duration 76 ms. QT/QTc 364/422 ms. P-R-T axes 45 -20 33.    Imaging:  MRA Neck (Carotids) wo & w Contrast   Final " Result   IMPRESSION:   HEAD MRI:   1.  No acute ischemia, mass/mass effect, or abnormal intracranial enhancement.      HEAD MRA:   1.  No proximal branch vessel occlusion, flow-limiting stenosis, aneurysm, or vascular malformation.      NECK MRA:   1.  No extracranial carotid artery flow-limiting stenosis or findings of dissection.   2.  Poorly visualized right vertebral artery origin may indicate severe stenosis or be secondary to artifact in the lower neck. Remaining extracranial vertebral arteries are patent.            MRA Brain ([place]) wo Contrast   Final Result   IMPRESSION:   HEAD MRI:   1.  No acute ischemia, mass/mass effect, or abnormal intracranial enhancement.      HEAD MRA:   1.  No proximal branch vessel occlusion, flow-limiting stenosis, aneurysm, or vascular malformation.      NECK MRA:   1.  No extracranial carotid artery flow-limiting stenosis or findings of dissection.   2.  Poorly visualized right vertebral artery origin may indicate severe stenosis or be secondary to artifact in the lower neck. Remaining extracranial vertebral arteries are patent.            MR Brain w/o & w Contrast   Final Result   IMPRESSION:   HEAD MRI:   1.  No acute ischemia, mass/mass effect, or abnormal intracranial enhancement.      HEAD MRA:   1.  No proximal branch vessel occlusion, flow-limiting stenosis, aneurysm, or vascular malformation.      NECK MRA:   1.  No extracranial carotid artery flow-limiting stenosis or findings of dissection.   2.  Poorly visualized right vertebral artery origin may indicate severe stenosis or be secondary to artifact in the lower neck. Remaining extracranial vertebral arteries are patent.               Report per radiology    Laboratory:  Labs Ordered and Resulted from Time of ED Arrival to Time of ED Departure   BASIC METABOLIC PANEL - Abnormal       Result Value    Sodium 137      Potassium 3.8      Chloride 100      Carbon Dioxide (CO2) 26      Anion Gap 11      Urea  Nitrogen 10.4      Creatinine 0.55      Calcium 9.5      Glucose 156 (*)     GFR Estimate >90     ROUTINE UA WITH MICROSCOPIC REFLEX TO CULTURE - Abnormal    Color Urine Straw      Appearance Urine Clear      Glucose Urine Negative      Bilirubin Urine Negative      Ketones Urine Negative      Specific Gravity Urine 1.004      Blood Urine Negative      pH Urine 6.5      Protein Albumin Urine Negative      Urobilinogen Urine Normal      Nitrite Urine Negative      Leukocyte Esterase Urine Trace (*)     Mucus Urine Present (*)     RBC Urine 1      WBC Urine 1      Squamous Epithelials Urine 1     ERYTHROCYTE SEDIMENTATION RATE AUTO - Normal    Erythrocyte Sedimentation Rate 17     CRP INFLAMMATION - Normal    CRP Inflammation <3.00     INFLUENZA A/B, RSV, & SARS-COV2 PCR - Normal    Influenza A PCR Negative      Influenza B PCR Negative      RSV PCR Negative      SARS CoV2 PCR Negative     TROPONIN T, HIGH SENSITIVITY - Normal    Troponin T, High Sensitivity <6     CBC WITH PLATELETS AND DIFFERENTIAL    WBC Count 5.8      RBC Count 4.74      Hemoglobin 13.7      Hematocrit 40.6      MCV 86      MCH 28.9      MCHC 33.7      RDW 12.4      Platelet Count 343      % Neutrophils 42      % Lymphocytes 44      % Monocytes 10      % Eosinophils 3      % Basophils 1      % Immature Granulocytes 0      NRBCs per 100 WBC 0      Absolute Neutrophils 2.4      Absolute Lymphocytes 2.6      Absolute Monocytes 0.6      Absolute Eosinophils 0.2      Absolute Basophils 0.0      Absolute Immature Granulocytes 0.0      Absolute NRBCs 0.0          Procedures   None    Emergency Department Course & Assessments:     Interventions:  Medications   acetaminophen (TYLENOL) tablet 1,000 mg (1,000 mg Oral $Given 3/18/23 1204)   meclizine (ANTIVERT) tablet 25 mg (25 mg Oral $Given 3/18/23 1242)   gadobutrol (GADAVIST) injection 10 mL (10 mLs Intravenous $Given 3/18/23 9049)   sodium chloride (PF) 0.9% PF flush 60 mL (60 mLs Intravenous $Given  3/18/23 1418)      Independent Interpretation (X-rays, CTs, rhythm strip):  None    Consultations/Discussion of Management or Tests:  None     Assessments:  ED Course as of 23 1845   Sat Mar 18, 2023   1149 I obtained history and examined the patient as noted above.    1639 I rechecked the patient and updated them on findings. Patient feels much better and is amenable to discharge.        Social Determinants of Health affecting care:   None    Disposition:  The patient was discharged to home.     Impression & Plan    CMS Diagnoses: None    Medical Decision Makin-year-old female with history of hypertension hyperlipidemia presenting to the ER for evaluation of left-sided headache, dizziness and generalized weakness.  Please see above for details of HPI and exam.  Patient noted to be afebrile vitally stable.  Her neurologic exam is nonfocal but differential was considered including ACS, stroke/TIA, temporal arteritis, vertigo, dehydration, severe electrolyte/metabolic derangement, infectious etiology amongst other things.  Work-up here in the ER is reassuring.  EKG shows no acute ischemic appearing changes.  High-sensitivity troponin is undetectable making ACS unlikely.  The rest of the patient's basic lab studies are normal.  ESR and CRP are normal making giant cell arteritis unlikely.  UA is normal.  Patient felt significant improvement with the above interventions.  Suspect headache is likely from primary headache syndrome such as tension versus migraine.  No findings suggestive of meningitis/encephalitis.  Patient is able to ambulate in the ER without difficulty.  Recommend Tylenol/ibuprofen as needed for headache.  We will prescribe meclizine as needed for dizziness.  She will follow-up closely with her PCP regarding today's visit.  They are comfortable with this plan and all questions were answered prior to patient discharge.    Diagnosis:    ICD-10-CM    1. Dizziness  R42       2. Left-sided  headache  R51.9            Discharge Medications:  Discharge Medication List as of 3/18/2023  4:53 PM      START taking these medications    Details   meclizine (ANTIVERT) 12.5 MG tablet Take 2 tablets (25 mg) by mouth 3 times daily as needed for dizziness, Disp-30 tablet, R-0, Local Print              Scribe Disclosure:  I, ANI LOPEZ, am serving as a scribe at 11:48 AM on 3/18/2023 to document services personally performed by Sonny Turk MD based on my observations and the provider's statements to me.      3/18/2023   Sonny Turk MD [place], Edgar Ronald, MD  03/18/23 4117

## 2023-03-20 LAB
ATRIAL RATE - MUSE: 81 BPM
DIASTOLIC BLOOD PRESSURE - MUSE: NORMAL MMHG
INTERPRETATION ECG - MUSE: NORMAL
P AXIS - MUSE: 45 DEGREES
PR INTERVAL - MUSE: 152 MS
QRS DURATION - MUSE: 76 MS
QT - MUSE: 364 MS
QTC - MUSE: 422 MS
R AXIS - MUSE: -20 DEGREES
SYSTOLIC BLOOD PRESSURE - MUSE: NORMAL MMHG
T AXIS - MUSE: 33 DEGREES
VENTRICULAR RATE- MUSE: 81 BPM

## 2023-07-19 ENCOUNTER — ANCILLARY PROCEDURE (OUTPATIENT)
Dept: GENERAL RADIOLOGY | Facility: CLINIC | Age: 73
End: 2023-07-19
Attending: PHYSICIAN ASSISTANT
Payer: COMMERCIAL

## 2023-07-19 ENCOUNTER — OFFICE VISIT (OUTPATIENT)
Dept: PEDIATRICS | Facility: CLINIC | Age: 73
End: 2023-07-19
Payer: COMMERCIAL

## 2023-07-19 VITALS
HEART RATE: 70 BPM | WEIGHT: 173.3 LBS | BODY MASS INDEX: 27.2 KG/M2 | DIASTOLIC BLOOD PRESSURE: 66 MMHG | SYSTOLIC BLOOD PRESSURE: 138 MMHG | RESPIRATION RATE: 16 BRPM | OXYGEN SATURATION: 99 % | TEMPERATURE: 98.5 F | HEIGHT: 67 IN

## 2023-07-19 DIAGNOSIS — K21.9 GASTROESOPHAGEAL REFLUX DISEASE WITHOUT ESOPHAGITIS: ICD-10-CM

## 2023-07-19 DIAGNOSIS — I10 ESSENTIAL HYPERTENSION: Primary | ICD-10-CM

## 2023-07-19 DIAGNOSIS — Z87.01 HISTORY OF RECENT PNEUMONIA: ICD-10-CM

## 2023-07-19 PROCEDURE — 71046 X-RAY EXAM CHEST 2 VIEWS: CPT | Mod: TC | Performed by: RADIOLOGY

## 2023-07-19 PROCEDURE — 36415 COLL VENOUS BLD VENIPUNCTURE: CPT | Performed by: PHYSICIAN ASSISTANT

## 2023-07-19 PROCEDURE — 99213 OFFICE O/P EST LOW 20 MIN: CPT | Performed by: PHYSICIAN ASSISTANT

## 2023-07-19 PROCEDURE — 80048 BASIC METABOLIC PNL TOTAL CA: CPT | Performed by: PHYSICIAN ASSISTANT

## 2023-07-19 RX ORDER — IRBESARTAN 150 MG/1
150 TABLET ORAL AT BEDTIME
Qty: 90 TABLET | Refills: 0 | Status: SHIPPED | OUTPATIENT
Start: 2023-07-19 | End: 2023-08-16

## 2023-07-19 RX ORDER — MECLIZINE HCL 12.5 MG 12.5 MG/1
25 TABLET ORAL 3 TIMES DAILY PRN
Qty: 30 TABLET | Refills: 0 | Status: CANCELLED | OUTPATIENT
Start: 2023-07-19

## 2023-07-19 ASSESSMENT — PAIN SCALES - GENERAL: PAINLEVEL: NO PAIN (0)

## 2023-07-19 NOTE — PROGRESS NOTES
"  Assessment & Plan     Essential hypertension    - irbesartan (AVAPRO) 150 MG tablet; Take 1 tablet (150 mg) by mouth At Bedtime  - Basic metabolic panel  (Ca, Cl, CO2, Creat, Gluc, K, Na, BUN); Future  - Basic metabolic panel  (Ca, Cl, CO2, Creat, Gluc, K, Na, BUN)    Gastroesophageal reflux disease without esophagitis      History of recent pneumonia    - XR Chest 2 Views; Future      Patient was recently [place] of the [place].  Her son wants a followup chest XR done today.  No specific concerns regarding cough, shortness of breath or symptoms discussed today.  Patient feels fine.           BMI:   Estimated body mass index is 27.14 kg/m  as calculated from the following:    Height as of this encounter: 1.702 m (5' 7\").    Weight as of this encounter: 78.6 kg (173 lb 4.8 oz).     [facility] IBAN Acharya is a 73 year old, presenting for the following health issues:  Hypertension        7/19/2023     4:18 PM   Additional Questions   Roomed by Nancy   Accompanied by none         7/19/2023     4:18 PM   Patient Reported Additional Medications   Patient reports taking the following new medications none     History of Present Illness       Hypertension: She presents for follow up of hypertension.  She does not check blood pressure  regularly outside of the clinic. Outpatient blood pressures have not been over 140/90. She follows a low salt diet.     She eats 2-3 servings of fruits and vegetables daily.She consumes 1 sweetened beverage(s) daily.She exercises with enough effort to increase her heart rate 20 to 29 minutes per day.  She exercises with enough effort to increase her heart rate 3 or less days per week.   She is taking medications regularly.        Review of Systems   Constitutional, HEENT, cardiovascular, pulmonary, gi and gu systems are negative, except as otherwise noted.      Objective    LMP 08/31/2022   There is no " height or weight on file to calculate BMI.  Physical Exam   GENERAL: healthy, alert and no distress  EYES: Eyes grossly normal to inspection, PERRL and conjunctivae and sclerae normal  NECK: no adenopathy, no asymmetry, masses, or scars and thyroid normal to palpation  RESP: lungs clear to auscultation - no rales, rhonchi or wheezes  CV: regular rate and rhythm, normal S1 S2, no S3 or S4, no murmur, click or rub, no peripheral edema and peripheral pulses strong  ABDOMEN: soft, nontender, no hepatosplenomegaly, no masses and bowel sounds normal  MS: no gross musculoskeletal defects noted, no edema    Mel Tovar PA-C

## 2023-07-20 LAB
ANION GAP SERPL CALCULATED.3IONS-SCNC: 12 MMOL/L (ref 7–15)
BUN SERPL-MCNC: 11.3 MG/DL (ref 8–23)
CALCIUM SERPL-MCNC: 9.1 MG/DL (ref 8.8–10.2)
CHLORIDE SERPL-SCNC: 104 MMOL/L (ref 98–107)
CREAT SERPL-MCNC: 0.57 MG/DL (ref 0.51–0.95)
DEPRECATED HCO3 PLAS-SCNC: 23 MMOL/L (ref 22–29)
GFR SERPL CREATININE-BSD FRML MDRD: >90 ML/MIN/1.73M2
GLUCOSE SERPL-MCNC: 131 MG/DL (ref 70–99)
POTASSIUM SERPL-SCNC: 4 MMOL/L (ref 3.4–5.3)
SODIUM SERPL-SCNC: 139 MMOL/L (ref 136–145)

## 2023-07-21 NOTE — RESULT ENCOUNTER NOTE
Note to staff: Please call the patient to explain results.    The results from your recent  chest XR is fine, but there is some evidence of a small opacity.  This can be seen with pneumonia or recent pneumonia, as it takes a while to resolve.  As discussed in our office visit, the patient was recently treated for pneumonia.  If she is having symptoms of pneumonia, fevers or feeling ill, she should be seen.  Otherwise, I would advise that she followup in about 4 weeks for a recheck and we can do another XR at that time.      Thank you for choosing [facility] for your health care needs,      Mel Tovar PA-C

## 2023-07-24 ENCOUNTER — TELEPHONE (OUTPATIENT)
Dept: PEDIATRICS | Facility: CLINIC | Age: 73
End: 2023-07-24
Payer: COMMERCIAL

## 2023-07-24 NOTE — TELEPHONE ENCOUNTER
Attempted to reach patient via , [facility]. See result note below.     Zeus PAUL RN 7/24/2023 at 9:45 AM

## 2023-07-24 NOTE — TELEPHONE ENCOUNTER
Received call back from patient's son, CTC on file. RN relayed provider result note below. Patient has upcoming appointment 8/16/23.     Zeus PAUL RN 7/24/2023 at 11:48 AM

## 2023-07-24 NOTE — TELEPHONE ENCOUNTER
----- Message from Mel Tovar PA-C sent at 7/21/2023  3:45 PM CDT -----  Note to staff: Please call the patient to explain results.    The results from your recent  chest XR is fine, but there is some evidence of a small opacity.  This can be seen with pneumonia or recent pneumonia, as it takes a while to resolve.  As discussed in our office visit, the patient was recently treated for pneumonia.  If she is having symptoms of pneumonia, fevers or feeling ill, she should be seen.  Otherwise, I would advise that she followup in about 4 weeks for a recheck and we can do another XR at that time.      Thank you for choosing [facility] for your health care needs,      Mel Tovar PA-C

## 2023-08-16 ENCOUNTER — OFFICE VISIT (OUTPATIENT)
Dept: PEDIATRICS | Facility: CLINIC | Age: 73
End: 2023-08-16
Payer: COMMERCIAL

## 2023-08-16 VITALS
SYSTOLIC BLOOD PRESSURE: 135 MMHG | TEMPERATURE: 97.6 F | HEIGHT: 62 IN | WEIGHT: 173.1 LBS | HEART RATE: 74 BPM | DIASTOLIC BLOOD PRESSURE: 75 MMHG | OXYGEN SATURATION: 98 % | BODY MASS INDEX: 31.86 KG/M2

## 2023-08-16 DIAGNOSIS — E78.5 HYPERLIPIDEMIA WITH TARGET LDL LESS THAN 130: ICD-10-CM

## 2023-08-16 DIAGNOSIS — Z12.31 ENCOUNTER FOR SCREENING MAMMOGRAM FOR BREAST CANCER: ICD-10-CM

## 2023-08-16 DIAGNOSIS — Z00.00 ENCOUNTER FOR MEDICARE ANNUAL WELLNESS EXAM: Primary | ICD-10-CM

## 2023-08-16 DIAGNOSIS — M54.16 LUMBAR RADICULOPATHY: ICD-10-CM

## 2023-08-16 DIAGNOSIS — M51.369 DDD (DEGENERATIVE DISC DISEASE), LUMBAR: ICD-10-CM

## 2023-08-16 DIAGNOSIS — R73.03 PREDIABETES: ICD-10-CM

## 2023-08-16 DIAGNOSIS — Z78.0 POSTMENOPAUSAL STATUS: ICD-10-CM

## 2023-08-16 DIAGNOSIS — I10 ESSENTIAL HYPERTENSION: ICD-10-CM

## 2023-08-16 PROBLEM — R59.0 INTRA-ABDOMINAL LYMPHADENOPATHY: Status: RESOLVED | Noted: 2020-08-16 | Resolved: 2023-08-16

## 2023-08-16 LAB — HBA1C MFR BLD: 6.4 % (ref 0–5.6)

## 2023-08-16 PROCEDURE — 80053 COMPREHEN METABOLIC PANEL: CPT | Performed by: INTERNAL MEDICINE

## 2023-08-16 PROCEDURE — 36415 COLL VENOUS BLD VENIPUNCTURE: CPT | Performed by: INTERNAL MEDICINE

## 2023-08-16 PROCEDURE — 83036 HEMOGLOBIN GLYCOSYLATED A1C: CPT | Performed by: INTERNAL MEDICINE

## 2023-08-16 PROCEDURE — 99213 OFFICE O/P EST LOW 20 MIN: CPT | Mod: 25 | Performed by: INTERNAL MEDICINE

## 2023-08-16 PROCEDURE — 99397 PER PM REEVAL EST PAT 65+ YR: CPT | Performed by: INTERNAL MEDICINE

## 2023-08-16 RX ORDER — IRBESARTAN 150 MG/1
150 TABLET ORAL AT BEDTIME
Qty: 90 TABLET | Refills: 3 | Status: SHIPPED | OUTPATIENT
Start: 2023-08-16 | End: 2024-05-01

## 2023-08-16 RX ORDER — ROSUVASTATIN CALCIUM 10 MG/1
10 TABLET, COATED ORAL DAILY
Qty: 90 TABLET | Refills: 3 | Status: SHIPPED | OUTPATIENT
Start: 2023-08-16 | End: 2024-05-01

## 2023-08-16 SDOH — ECONOMIC STABILITY: FOOD INSECURITY: WITHIN THE PAST 12 MONTHS, THE FOOD YOU BOUGHT JUST DIDN'T LAST AND YOU DIDN'T HAVE MONEY TO GET MORE.: NEVER TRUE

## 2023-08-16 SDOH — ECONOMIC STABILITY: FOOD INSECURITY: WITHIN THE PAST 12 MONTHS, YOU WORRIED THAT YOUR FOOD WOULD RUN OUT BEFORE YOU GOT MONEY TO BUY MORE.: NEVER TRUE

## 2023-08-16 SDOH — ECONOMIC STABILITY: INCOME INSECURITY: IN THE LAST 12 MONTHS, WAS THERE A TIME WHEN YOU WERE NOT ABLE TO PAY THE MORTGAGE OR RENT ON TIME?: NO

## 2023-08-16 SDOH — HEALTH STABILITY: PHYSICAL HEALTH: ON AVERAGE, HOW MANY DAYS PER WEEK DO YOU ENGAGE IN MODERATE TO STRENUOUS EXERCISE (LIKE A BRISK WALK)?: 1 DAY

## 2023-08-16 SDOH — HEALTH STABILITY: PHYSICAL HEALTH: ON AVERAGE, HOW MANY MINUTES DO YOU ENGAGE IN EXERCISE AT THIS LEVEL?: 10 MIN

## 2023-08-16 SDOH — ECONOMIC STABILITY: TRANSPORTATION INSECURITY
IN THE PAST 12 MONTHS, HAS THE LACK OF TRANSPORTATION KEPT YOU FROM MEDICAL APPOINTMENTS OR FROM GETTING MEDICATIONS?: NO

## 2023-08-16 SDOH — ECONOMIC STABILITY: INCOME INSECURITY: HOW HARD IS IT FOR YOU TO PAY FOR THE VERY BASICS LIKE FOOD, HOUSING, MEDICAL CARE, AND HEATING?: NOT HARD AT ALL

## 2023-08-16 SDOH — ECONOMIC STABILITY: TRANSPORTATION INSECURITY
IN THE PAST 12 MONTHS, HAS LACK OF TRANSPORTATION KEPT YOU FROM MEETINGS, WORK, OR FROM GETTING THINGS NEEDED FOR DAILY LIVING?: NO

## 2023-08-16 ASSESSMENT — ENCOUNTER SYMPTOMS: COUGH: 1

## 2023-08-16 ASSESSMENT — LIFESTYLE VARIABLES
HOW OFTEN DO YOU HAVE SIX OR MORE DRINKS ON ONE OCCASION: NEVER
AUDIT-C TOTAL SCORE: 0
SKIP TO QUESTIONS 9-10: 1
HOW MANY STANDARD DRINKS CONTAINING ALCOHOL DO YOU HAVE ON A TYPICAL DAY: PATIENT DOES NOT DRINK
HOW OFTEN DO YOU HAVE A DRINK CONTAINING ALCOHOL: NEVER

## 2023-08-16 ASSESSMENT — SOCIAL DETERMINANTS OF HEALTH (SDOH)
DO YOU BELONG TO ANY CLUBS OR ORGANIZATIONS SUCH AS CHURCH GROUPS UNIONS, FRATERNAL OR ATHLETIC GROUPS, OR SCHOOL GROUPS?: NO
HOW OFTEN DO YOU ATTEND CHURCH OR RELIGIOUS SERVICES?: MORE THAN 4 TIMES PER YEAR
HOW OFTEN DO YOU GET TOGETHER WITH FRIENDS OR RELATIVES?: ONCE A WEEK
IN A TYPICAL WEEK, HOW MANY TIMES DO YOU TALK ON THE PHONE WITH FAMILY, FRIENDS, OR NEIGHBORS?: TWICE A WEEK

## 2023-08-16 ASSESSMENT — PAIN SCALES - GENERAL: PAINLEVEL: NO PAIN (0)

## 2023-08-16 ASSESSMENT — ACTIVITIES OF DAILY LIVING (ADL): CURRENT_FUNCTION: NO ASSISTANCE NEEDED

## 2023-08-16 NOTE — LETTER
August 21, 2023      Marck Hernandez  [address] [address] [facility] [address]        Dear ,    We are writing to inform you of your test results.    {results letter list:[id number]}    Resulted Orders   Comprehensive metabolic panel (BMP + Alb, Alk Phos, ALT, AST, Total. Bili, TP)   Result Value Ref Range    Sodium 142 136 - 145 mmol/L    Potassium 4.4 3.4 - 5.3 mmol/L    Chloride 102 98 - 107 mmol/L    Carbon Dioxide (CO2) 26 22 - 29 mmol/L    Anion Gap 14 7 - 15 mmol/L    Urea Nitrogen 13.1 8.0 - 23.0 mg/dL    Creatinine 0.61 0.51 - 0.95 mg/dL    Calcium 9.3 8.8 - 10.2 mg/dL    Glucose 94 70 - 99 mg/dL    Alkaline Phosphatase 102 35 - 104 U/L    AST 16 0 - 45 U/L      Comment:      Reference intervals for this test were updated on 6/12/2023 to more accurately reflect our healthy population. There may be differences in the flagging of prior results with similar values performed with this method. Interpretation of those prior results can be made in the context of the updated reference intervals.    ALT 15 0 - 50 U/L      Comment:      Reference intervals for this test were updated on 6/12/2023 to more accurately reflect our healthy population. There may be differences in the flagging of prior results with similar values performed with this method. Interpretation of those prior results can be made in the context of the updated reference intervals.      Protein Total 8.0 6.4 - 8.3 g/dL    Albumin 4.8 3.5 - 5.2 g/dL    Bilirubin Total 0.3 <=1.2 mg/dL    GFR Estimate >90 >60 mL/min/1.73m2   Hemoglobin A1c   Result Value Ref Range    Hemoglobin A1C 6.4 (H) 0.0 - 5.6 %      Comment:      Normal <5.7%   Prediabetes 5.7-6.4%    Diabetes 6.5% or higher     Note: Adopted from ADA consensus guidelines.       If you have any questions or concerns, please call the clinic at the number listed above.       Sincerely,      Duke Zhu MD

## 2023-08-16 NOTE — LETTER
August 21, 2023      Marck Hernandez  [address] [address] [address]        Dear ,    We are writing to inform you of your test results.     Your blood chemistries, kidney and liver function tests are normal.    Your labwork is consistent with Prediabetes, defined as a fasting blood sugar between 100 and 125. Prediabetes puts you at risk for Adult Onset (type 2) Diabetes.  You can prevent or delay the development of Type 2 Diabetes through lifestyle changes including modest weight loss and regular exercise.  Reducing your weight through daily calorie restriction and modest physical activity for 30 minutes every day can often return elevated blood glucose levels to the normal range.   [facility] offers group class sessions for those newly diagnosed with prediabetes. Diabetes educators will lead discussions and provide you with helpful tips and tools for living well and reducing your risk of developing diabetes.   Pre-diabetes classes are community education classes and do not require a referral.   For questions or more information regarding Pre-diabetes education classes, call the Diabetes Education Team at [phone] ([place] locations) or [phone] ([facility] locations).     Resulted Orders   Comprehensive metabolic panel (BMP + Alb, Alk Phos, ALT, AST, Total. Bili, TP)   Result Value Ref Range    Sodium 142 136 - 145 mmol/L    Potassium 4.4 3.4 - 5.3 mmol/L    Chloride 102 98 - 107 mmol/L    Carbon Dioxide (CO2) 26 22 - 29 mmol/L    Anion Gap 14 7 - 15 mmol/L    Urea Nitrogen 13.1 8.0 - 23.0 mg/dL    Creatinine 0.61 0.51 - 0.95 mg/dL    Calcium 9.3 8.8 - 10.2 mg/dL    Glucose 94 70 - 99 mg/dL    Alkaline Phosphatase 102 35 - 104 U/L    AST 16 0 - 45 U/L      Comment:      Reference intervals for this test were updated on 6/12/2023 to more accurately reflect our healthy population. There may be differences in the flagging of prior results with similar values performed with this method.  Interpretation of those prior results can be made in the context of the updated reference intervals.    ALT 15 0 - 50 U/L      Comment:      Reference intervals for this test were updated on 6/12/2023 to more accurately reflect our healthy population. There may be differences in the flagging of prior results with similar values performed with this method. Interpretation of those prior results can be made in the context of the updated reference intervals.      Protein Total 8.0 6.4 - 8.3 g/dL    Albumin 4.8 3.5 - 5.2 g/dL    Bilirubin Total 0.3 <=1.2 mg/dL    GFR Estimate >90 >60 mL/min/1.73m2   Hemoglobin A1c   Result Value Ref Range    Hemoglobin A1C 6.4 (H) 0.0 - 5.6 %      Comment:      Normal <5.7%   Prediabetes 5.7-6.4%    Diabetes 6.5% or higher     Note: Adopted from ADA consensus guidelines.       If you have any questions or concerns, please call the clinic at the number listed above.       Sincerely,      Duke Zhu MD

## 2023-08-16 NOTE — PATIENT INSTRUCTIONS
You will be contacted to schedule visit with Pain Management regarding a steroid shot for your low back pain    High cholesterol.  Start rosuvastatin and return for repeat labwork in 2-3 months      Personalized Prevention Plan  You are due for the preventive services outlined below.  Your care team is available to assist you in scheduling these services.  If you have already completed any of these items, please share that information with your care team to update in your medical record.  Health Maintenance Due   Topic Date Due    Osteoporosis Screening  Never done    ANNUAL REVIEW OF HM ORDERS  Never done    Discuss Advance Care Planning  Never done    Zoster (Shingles) Vaccine (1 of 2) Never done    Mammogram  08/06/2020    COVID-19 Vaccine (4 - Moderna series) 07/01/2022     Preventive Health Recommendations    See your health care provider every year to  Review health changes.   Discuss preventive care.    Review your medicines if your doctor has prescribed any.  You no longer need a yearly Pap test unless you've had an abnormal Pap test in the past 10 years. If you have vaginal symptoms, such as bleeding or discharge, be sure to talk with your provider about a Pap test.  Every 1 to 2 years, have a mammogram.  If you are over 69, talk with your health care provider about whether or not you want to continue having screening mammograms.  Every 10 years, have a colonoscopy. Or, have a yearly FIT test (stool test). These exams will check for colon cancer.   Have a cholesterol test every 5 years, or more often if your doctor advises it.   Have a diabetes test (fasting glucose) every three years. If you are at risk for diabetes, you should have this test more often.   At age 65, have a bone density scan (DEXA) to check for osteoporosis (brittle bone disease).    Shots:  Get a flu shot each year.  Get a tetanus shot every 10 years.  Talk to your doctor about your pneumonia vaccines. There are now two you should receive  - Pneumovax (PPSV 23) and Prevnar (PCV 13).  Talk to your pharmacist about the shingles vaccine.  Talk to your doctor about the hepatitis B vaccine.    Nutrition:   Eat at least 5 servings of fruits and vegetables each day.  Eat whole-grain bread, whole-wheat pasta and brown rice instead of white grains and rice.  Get adequate Calcium and Vitamin D.     Lifestyle  Exercise at least 150 minutes a week (30 minutes a day, 5 days a week). This will help you control your weight and prevent disease.  Limit alcohol to one drink per day.  No smoking.   Wear sunscreen to prevent skin cancer.   See your dentist twice a year for an exam and cleaning.  See your eye doctor every 1 to 2 years to screen for conditions such as glaucoma, macular degeneration and cataracts.    Personalized Prevention Plan  You are due for the preventive services outlined below.  Your care team is available to assist you in scheduling these services.  If you have already completed any of these items, please share that information with your care team to update in your medical record.  Health Maintenance   Topic Date Due    DEXA  Never done    ANNUAL REVIEW OF HM ORDERS  Never done    ADVANCE CARE PLANNING  Never done    ZOSTER IMMUNIZATION (1 of 2) Never done    MAMMO SCREENING  08/06/2020    COVID-19 Vaccine (4 - Moderna series) 07/01/2022    INFLUENZA VACCINE (1) 09/01/2023    MEDICARE ANNUAL WELLNESS VISIT  08/16/2024    FALL RISK ASSESSMENT  08/16/2024    DTAP/TDAP/TD IMMUNIZATION (3 - Td or Tdap) 05/10/2026    COLORECTAL CANCER SCREENING  06/08/2027    LIPID  08/31/2027    HEPATITIS C SCREENING  Completed    PHQ-2 (once per calendar year)  Completed    Pneumococcal Vaccine: 65+ Years  Completed    IPV IMMUNIZATION  Aged Out    MENINGITIS IMMUNIZATION  Aged Out

## 2023-08-16 NOTE — PROGRESS NOTES
"SUBJECTIVE:   Marck is a 73 year old who presents for Preventive Visit.      8/16/2023     3:04 PM   Additional Questions   Roomed by Roxy   Accompanied by son and granddaughters         8/16/2023     3:04 PM   Patient Reported Additional Medications   Patient reports taking the following new medications none       Are you in the first 12 months of your Medicare coverage?  No    Healthy Habits:     In general, how would you rate your overall health?  Good    Duration of exercise:  15-30 minutes    Do you usually eat at least 4 servings of fruit and vegetables a day, include whole grains    & fiber and avoid regularly eating high fat or \"junk\" foods?  Yes    Taking medications regularly:  Yes    Medication side effects:  None    Ability to successfully perform activities of daily living:  No assistance needed    Home Safety:  No safety concerns identified    Hearing Impairment:  No hearing concerns    In the past 6 months, have you been bothered by leaking of urine?  No    In general, how would you rate your overall mental or emotional health?  Good    Additional concerns today:  No        Have you ever done Advance Care Planning? (For example, a Health Directive, POLST, or a discussion with a medical provider or your loved ones about your wishes): No, advance care planning information given to patient to review.  Patient plans to discuss their wishes with loved ones or provider.         Fall risk  Fallen 2 or more times in the past year?: No  Any fall with injury in the past year?: No    Cognitive Screening   1) Repeat 3 items (Leader, Season, Table)    2) Clock draw: ABNORMAL    3) 3 item recall: Recalls NO objects   Results:  unable to finish with  service on phone it was difficult to complete.      Suspect invalid result secondary to language barrier    Mini-CogTM Copyright SASHA Barreto. Licensed by the author for use in [facility]; reprinted with permission (maira@.[email or web address]). All rights " reserved.      Patient Active Problem List   Diagnosis    Hyperlipidemia with target LDL less than 130    Essential hypertension    Gastroesophageal reflux disease without esophagitis    Prediabetes     Current Outpatient Medications   Medication Sig Dispense Refill    irbesartan (AVAPRO) 150 MG tablet Take 1 tablet (150 mg) by mouth At Bedtime 90 tablet 3    rosuvastatin (CRESTOR) 10 MG tablet Take 1 tablet (10 mg) by mouth daily 90 tablet 3        Reviewed and updated as needed this visit by clinical staff   Tobacco  Allergies  Meds              Reviewed and updated as needed this visit by Provider                 Social History     Tobacco Use    Smoking status: Never    Smokeless tobacco: Never   Substance Use Topics    Alcohol use: No             8/16/2023     2:51 PM   Alcohol Use   Prescreen: >3 drinks/day or >7 drinks/week? Not Applicable     Do you have a current opioid prescription? No  Do you use any other controlled substances or medications that are not prescribed by a provider? None    Current providers sharing in care for this patient include:   Patient Care Team:  Duke Zhu MD as PCP - General (Internal Medicine)  Duke Zhu MD as Assigned PCP    The following health maintenance items are reviewed in Epic and correct as of today:  Health Maintenance   Topic Date Due    DEXA  Never done    ANNUAL REVIEW OF HM ORDERS  Never done    ZOSTER IMMUNIZATION (1 of 2) Never done    MAMMO SCREENING  08/06/2020    COVID-19 Vaccine (4 - Moderna series) 07/01/2022    INFLUENZA VACCINE (1) 09/01/2023    MEDICARE ANNUAL WELLNESS VISIT  08/16/2024    FALL RISK ASSESSMENT  08/16/2024    DTAP/TDAP/TD IMMUNIZATION (3 - Td or Tdap) 05/10/2026    COLORECTAL CANCER SCREENING  06/08/2027    LIPID  08/31/2027    ADVANCE CARE PLANNING  08/16/2028    HEPATITIS C SCREENING  Completed    PHQ-2 (once per calendar year)  Completed    Pneumococcal Vaccine: 65+ Years  Completed    IPV IMMUNIZATION  Aged Out     MENINGITIS IMMUNIZATION  Aged Out       Patient Active Problem List   Diagnosis    Hyperlipidemia with target LDL less than 130    Essential hypertension    Gastroesophageal reflux disease without esophagitis    Prediabetes     Past Surgical History:   Procedure Laterality Date    COLONOSCOPY N/A 6/8/2017    Procedure: COLONOSCOPY;  COLONOSCOPY;  Surgeon: Alpesh Clark MD;  Location: [facility]    NO HISTORY OF SURGERY         Social History     Tobacco Use    Smoking status: Never    Smokeless tobacco: Never   Substance Use Topics    Alcohol use: No     Family History   Problem Relation Age of Onset    Diabetes No family hx of     Heart Disease No family hx of     Thyroid Disease No family hx of          Current Outpatient Medications   Medication Sig Dispense Refill    irbesartan (AVAPRO) 150 MG tablet Take 1 tablet (150 mg) by mouth At Bedtime 90 tablet 3                Review of Systems   Constitutional:  Negative for chills and fever.   HENT:  Negative for congestion, ear pain, hearing loss and sore throat.    Eyes:  Negative for pain and visual disturbance.   Respiratory:  Negative for cough and shortness of breath.    Cardiovascular:  Negative for chest pain, palpitations and peripheral edema.   Gastrointestinal:  Negative for abdominal pain, constipation, diarrhea, heartburn, hematochezia and nausea.   Breasts:  Negative for tenderness, breast mass and discharge.   Genitourinary:  Negative for dysuria, frequency, genital sores, hematuria, pelvic pain, urgency, vaginal bleeding and vaginal discharge.   Musculoskeletal:  Positive for back pain. Negative for arthralgias, joint swelling and myalgias.   Skin:  Negative for rash.   Neurological:  Negative for dizziness, weakness, headaches and paresthesias.   Psychiatric/Behavioral:  Negative for mood changes. The patient is not nervous/anxious.          OBJECTIVE:   /75 (BP Location: Right arm, Patient Position: Sitting, Cuff Size: Adult Large)    "Pulse 74   Temp 97.6  F (36.4  C) (Tympanic)   Ht 1.575 m (5' 2\")   Wt 78.5 kg (173 lb 1.6 oz)   LMP 08/31/2022 (Exact Date)   SpO2 98%   BMI 31.66 kg/m   Estimated body mass index is 31.66 kg/m  as calculated from the following:    Height as of this encounter: 1.575 m (5' 2\").    Weight as of this encounter: 78.5 kg (173 lb 1.6 oz).  Physical Exam  GENERAL APPEARANCE:   alert and no distress  EYES: Eyes grossly normal to inspection, PERRL and conjunctivae and sclerae normal  HENT: ear canals and TM's normal, nose and mouth without ulcers or lesions, oropharynx clear and oral mucous membranes moist  NECK: no adenopathy, no asymmetry, masses, or scars and thyroid normal to palpation  RESP: lungs clear to auscultation - no rales, rhonchi or wheezes  CV: regular rate and rhythm, normal S1 S2, no S3 or S4, no murmur, click or rub, no peripheral edema and peripheral pulses strong  ABDOMEN: soft, nontender, no hepatosplenomegaly, no masses and bowel sounds normal  MS: no musculoskeletal defects are noted and gait is age appropriate without ataxia  Back: BACK: Lumbosacral spine area reveals no midline tenderness or mass.  Straight leg raise is positive on the right   SKIN: no suspicious lesions or rashes  NEURO: Normal strength and tone, sensory exam grossly normal, mentation intact and speech normal  PSYCH: mentation appears normal and affect normal/bright    ASSESSMENT / PLAN:   (Z00.00) Encounter for Medicare annual wellness exam  (primary encounter diagnosis)  Screening-colonoscopy 2017, due for mammogram, due for DEXA    (R73.03) Prediabetes  Comment:   Plan: Hemoglobin A1c          (I10) Essential hypertension  Comment: Adequate control.  Continue current medication.   Plan: irbesartan (AVAPRO) 150 MG tablet          (E78.5) Hyperlipidemia with target LDL less than 130  Comment:   Recent Labs   Lab Test 08/31/22  0903 08/06/18  1359   CHOL 211* 248*   HDL 34* 35*   * 182*   TRIG 139 155*    The 10-year " "ASCVD risk score (Prince VOGEL, et al., 2019) is: 13.8%    Values used to calculate the score:      Age: 73 years      Sex: Female      Is Non- : Yes      Diabetic: No      Tobacco smoker: No      Systolic Blood Pressure: 135 mmHg      Is BP treated: Yes      HDL Cholesterol: 34 mg/dL      Total Cholesterol: 211 mg/dL   Plan: rosuvastatin (CRESTOR) 10 MG tablet,         Comprehensive metabolic panel (BMP + Alb, Alk         Phos, ALT, AST, Total. Bili, TP)             Start crestor, Medication side effects discussed. Rtc for follow-up labwork 2 months    (M51.36) DDD (degenerative disc disease), lumbar  (M54.16) Lumbar radiculopathy  Comment: recurrent LBP, improved following ELLEN in 2019. referral to Pain mgmt for ELLEN  Plan: Pain Management  Referral            Patient has been advised of split billing requirements and indicates understanding: Yes      COUNSELING:  Reviewed preventive health counseling, as reflected in patient instructions       Regular exercise       Healthy diet/nutrition      BMI:   Estimated body mass index is 31.66 kg/m  as calculated from the following:    Height as of this encounter: 1.575 m (5' 2\").    Weight as of this encounter: 78.5 kg (173 lb 1.6 oz).         She reports that she has never smoked. She has never used smokeless tobacco.      Appropriate preventive services were discussed with this patient, including applicable screening as appropriate for cardiovascular disease, diabetes, osteopenia/osteoporosis, and glaucoma.  As appropriate for age/gender, discussed screening for colorectal cancer, prostate cancer, breast cancer, and cervical cancer. Checklist reviewing preventive services available has been given to the patient.    Reviewed patients plan of care and provided an AVS. The Basic Care Plan (routine screening as documented in Health Maintenance) for Marck meets the Care Plan requirement. This Care Plan has been established and reviewed with the " Patient and son.          Duke Zhu MD  [facility]

## 2023-08-17 LAB
ALBUMIN SERPL BCG-MCNC: 4.8 G/DL (ref 3.5–5.2)
ALP SERPL-CCNC: 102 U/L (ref 35–104)
ALT SERPL W P-5'-P-CCNC: 15 U/L (ref 0–50)
ANION GAP SERPL CALCULATED.3IONS-SCNC: 14 MMOL/L (ref 7–15)
AST SERPL W P-5'-P-CCNC: 16 U/L (ref 0–45)
BILIRUB SERPL-MCNC: 0.3 MG/DL
BUN SERPL-MCNC: 13.1 MG/DL (ref 8–23)
CALCIUM SERPL-MCNC: 9.3 MG/DL (ref 8.8–10.2)
CHLORIDE SERPL-SCNC: 102 MMOL/L (ref 98–107)
CREAT SERPL-MCNC: 0.61 MG/DL (ref 0.51–0.95)
DEPRECATED HCO3 PLAS-SCNC: 26 MMOL/L (ref 22–29)
GFR SERPL CREATININE-BSD FRML MDRD: >90 ML/MIN/1.73M2
GLUCOSE SERPL-MCNC: 94 MG/DL (ref 70–99)
POTASSIUM SERPL-SCNC: 4.4 MMOL/L (ref 3.4–5.3)
PROT SERPL-MCNC: 8 G/DL (ref 6.4–8.3)
SODIUM SERPL-SCNC: 142 MMOL/L (ref 136–145)

## 2023-08-17 ASSESSMENT — ENCOUNTER SYMPTOMS
WEAKNESS: 0
DIARRHEA: 0
HEARTBURN: 0
NERVOUS/ANXIOUS: 0
BACK PAIN: 1
MYALGIAS: 0
NAUSEA: 0
BREAST MASS: 0
COUGH: 0
ARTHRALGIAS: 0
FREQUENCY: 0
HEMATOCHEZIA: 0
EYE PAIN: 0
HEMATURIA: 0
DIZZINESS: 0
SORE THROAT: 0
SHORTNESS OF BREATH: 0
CHILLS: 0
HEADACHES: 0
DYSURIA: 0
ABDOMINAL PAIN: 0
FEVER: 0
PALPITATIONS: 0
JOINT SWELLING: 0
CONSTIPATION: 0
PARESTHESIAS: 0

## 2023-08-22 ENCOUNTER — OFFICE VISIT (OUTPATIENT)
Dept: PALLIATIVE MEDICINE | Facility: CLINIC | Age: 73
End: 2023-08-22
Attending: INTERNAL MEDICINE
Payer: COMMERCIAL

## 2023-08-22 VITALS — HEART RATE: 78 BPM | DIASTOLIC BLOOD PRESSURE: 86 MMHG | SYSTOLIC BLOOD PRESSURE: 160 MMHG | OXYGEN SATURATION: 98 %

## 2023-08-22 DIAGNOSIS — M54.16 LUMBAR RADICULOPATHY: ICD-10-CM

## 2023-08-22 PROCEDURE — 99204 OFFICE O/P NEW MOD 45 MIN: CPT | Performed by: PHYSICAL MEDICINE & REHABILITATION

## 2023-08-22 ASSESSMENT — PAIN SCALES - GENERAL: PAINLEVEL: EXTREME PAIN (8)

## 2023-08-22 NOTE — PROGRESS NOTES
[facility] Interventional Consult    Date of visit: 8/22/2023      Reason for consultation:    Primary Care Provider is Duke Zhu.    Marck Hernandez is a 73 year old female who I was asked to see in consultation by Duke Zhu .    Consultation and Evaluation for: Interventional consultation for back pain    Review of Electronic Chart: Today I have also reviewed available medical information in the patient's medical record at [place] ([facility]), including relevant provider notes, laboratory work, and imaging.     Chief Complaint:    Chief Complaint   Patient presents with    Pain       Pain history:  Marck Hernandez is a 73 year old female who presents with complaint of right sided low back and leg pain. She was previously seen by me for this issue in 2019. Today she reports having return of the same pain she had back then.  The pain is located in the right low back with radiation into the buttock lateral leg to the top of the foot.  It is aggravated with sitting and sleeping at night.  It is 8/10 in severity.  She had a right L5-S1 transforaminal epidural steroid injection previously in 2019.  She reports this helped significantly with her pain and she only started having pain again 2 weeks ago.  The pain that started 2 weeks ago is the same as the pain she previously had before the injection.    Red Flags: The patient denies bowel or bladder incontinence, parasthesias, weakness, saddle anesthesia, unintentional weight loss, or fever/chills/sweats.       Medications:       Current pain medications:  None    Past Pain Treatments:  PT: None  Pain psychologist: none  Relaxation techniques/biofeedback: None  TENs Unit: None  Injections: Right L5-S1 Transforaminal epidural steroid injection in 2019 - H until just couple weeks ago  Self-care:   None  Surgeries related to pain: None  Alternative Therapies:    Chiropractic: None    Acupuncture: None  Other:  None    Diagnostic tests:  MRI of Lumbar Spine was completed in 2019:   FINDINGS: Alignment is maintained. Bone marrow demonstrates scattered  mild marrow heterogeneity with mild degenerative endplate change most  conspicuous at L4-5 and L5-S1. Inferior endplate T12 Schmorl's node is  present with minimal associated marrow edema. Multilevel mild to  moderate disc height loss is present throughout. Multilevel mild facet  arthropathy is present with moderate facet arthropathy at L4-5 and  L5-S1. Multilevel epidural lipomatosis is present contributing to  spinal canal narrowing most conspicuous at L3-4, L4-5, and L5-S1.  Conus medullaris and cauda equina are unremarkable. Paraspinal soft  tissues are unremarkable.     Segmental Analysis:      T12-L1:  No significant spinal canal or foraminal stenosis.      L1-L2:  No significant spinal canal or foraminal stenosis.       L2-L3:  Broad-based disc bulge and mild facet arthropathy. Mild spinal  canal stenosis. Mild bilateral foraminal stenosis.     L3-L4:  Broad-based disc bulge and mild bilateral facet arthropathy.  Moderate epidural lipomatosis. Mild to moderate spinal canal stenosis.  Mild bilateral foraminal stenosis.     L4-L5:  Broad-based disc bulge, moderate bilateral facet arthropathy,  and moderate epidural lipomatosis. Moderate to severe spinal canal  stenosis. Mild bilateral foraminal stenosis.       L5-S1:  Broad-based disc bulge, moderate bilateral facet arthropathy,  and mild epidural lipomatosis. Moderate spinal canal stenosis. Mild  bilateral foraminal stenosis.                                                                      IMPRESSION: Degenerative change as detailed, most conspicuous at L4-5,  and L5-S1.    EMG/Testing:  NA    Labs:   NA    Past Medical History:  Past Medical History:   Diagnosis Date    GERD (gastroesophageal reflux disease)     HTN (hypertension)     Previously treated in [facility], not on medication since approx 2014       Past  Surgical History:  Past Surgical History:   Procedure Laterality Date    COLONOSCOPY N/A 6/8/2017    Procedure: COLONOSCOPY;  COLONOSCOPY;  Surgeon: Alpesh Clark MD;  Location:  GI    NO HISTORY OF SURGERY         Medications:  Current Outpatient Medications   Medication Sig Dispense Refill    irbesartan (AVAPRO) 150 MG tablet Take 1 tablet (150 mg) by mouth At Bedtime 90 tablet 3    rosuvastatin (CRESTOR) 10 MG tablet Take 1 tablet (10 mg) by mouth daily 90 tablet 3       Allergies:   No Known Allergies    Family history:  Family History   Problem Relation Age of Onset    Diabetes No family hx of     Heart Disease No family hx of     Thyroid Disease No family hx of        Social History:  Home situation: Lives in [place], [place].  Occupation/Schooling: Does not work  Tobacco use: none  Alcohol use: none  Drug use: none    Physical Exam:  Vitals:    08/22/23 1557   BP: (!) 160/86   Pulse: 78   SpO2: 98%     Exam:  Constitutional: Well developed, well nourished, appears stated age.  HEENT: Head atraumatic, normocephalic. Eyes without conjunctival injection or jaundice. Neck supple. No obvious neck masses.  Respiratory: Breathing unlabored on room air  Psychiatric/mental status: Alert, without lethargy or stupor. Speech fluent. Appropriate affect. Mood normal. Able to follow commands without difficulty.     Musculoskeletal exam:  Gait/Station/Posture: Slightly forward flexed posture. Antalgic gait  Normal bulk and tone.     Lumbar spine:  Range of motion within normal limits    Rotation/ext to right: painful    Rotation/ext to left: no change  Myofascial tenderness: right lower lumbar paraspinals and gluteal musculature    Neurologic exam:  CN:  Cranial nerves 2-12 are grossly intact  Motor Strength:  5/5 symmetric LE strength    Sensory:  (lower extremities):   Light touch: normal    Allodynia: absent    Hyperalgesia: absent       Assessment:  Marck Hernandez is a 73 year old female with a past medical history  significant for GERD, HLD, HTN, prediabetes who presents with complaints of right sided back and leg pain.     1. Chronic back pain: Symptoms began insidiously about 5 years ago with pain in the right low back radiating into the buttock, lateral thigh, lateral leg and mainly the top of the foot. No red flag symptoms present. Neuro exam is normal. She previously had excellent benefit with a lumbar ELLEN in 2019 which helped with pain until just 2 weeks ago. Symptoms she is having now are the same as those she was having initially prior to her injection. MRI of lumbar spine from 2019 shows a broad-based disc bulge at L4-5 and L5-1 with moderate facet arthropathy, moderate to severe spinal canal stenosis and mild bilateral foraminal stenosis. Etiology of her pain is most likely multifactorial secondary to lumbar spinal canal stenosis and radiculitis/radiculopathy at right L4-5 vs L5-S1.      Plan:  The following recommendations were given to the patient. Diagnosis, treatment options, risks, benefits, and alternatives were discussed, and all questions were answered. The patient expressed understanding of the plan for management.       Order placed for an updated Lumbar MRI since it has been over 3 years since last imaging has been completed. Will plan on repeat lumbar right L5-S1 Transforaminal epidural steroid injection as long as there is no significant change on imaging.     Shilpa Turk MD  [facility]

## 2023-08-22 NOTE — PATIENT INSTRUCTIONS
Order placed for a lumbar MRI. The imaging department will call to schedule this. Once I review the results I will order the injection and you will be called to schedule it.  Shilpa Turk MD  [facility]     ----------------------------------------------------------------  Clinic Number:  [phone]   Call with any questions about your care and for scheduling assistance.   Calls are returned Monday through Friday between 8 AM and 4:30 PM. We usually get back to you within 2 business days depending on the issue/request.    If we are prescribing your medications:  For opioid medication refills, call the clinic or send a "[employer] message 7 days in advance.  Please include:  Name of requested medication  Name of the pharmacy.  For non-opioid medications, call your pharmacy directly to request a refill. Please allow 3-4 days to be processed.   Per [place] State Law:  All controlled substance prescriptions must be filled within 30 days of being written.    For those controlled substances allowing refills, pickup must occur within 30 days of last fill.      We believe regular attendance is key to your success in our program!    Any time you are unable to keep your appointment we ask that you call us at least 24 hours in advance to cancel.This will allow us to offer the appointment time to another patient.   Multiple missed appointments may lead to dismissal from the clinic.

## 2023-08-22 NOTE — NURSING NOTE
8/22/2023     4:03 PM   PEG Score   PEG Total Score 8         Radha Main MA  [facility] [place]

## 2023-09-07 ENCOUNTER — ANCILLARY PROCEDURE (OUTPATIENT)
Dept: MRI IMAGING | Facility: CLINIC | Age: 73
End: 2023-09-07
Attending: PHYSICAL MEDICINE & REHABILITATION
Payer: COMMERCIAL

## 2023-09-07 DIAGNOSIS — M54.16 LUMBAR RADICULOPATHY: ICD-10-CM

## 2023-09-07 PROCEDURE — 72148 MRI LUMBAR SPINE W/O DYE: CPT | Performed by: RADIOLOGY

## 2023-09-14 ENCOUNTER — TELEPHONE (OUTPATIENT)
Dept: PALLIATIVE MEDICINE | Facility: CLINIC | Age: 73
End: 2023-09-14
Payer: COMMERCIAL

## 2023-09-14 DIAGNOSIS — M54.16 LUMBAR RADICULOPATHY: Primary | ICD-10-CM

## 2023-09-14 DIAGNOSIS — M48.061 SPINAL STENOSIS OF LUMBAR REGION, UNSPECIFIED WHETHER NEUROGENIC CLAUDICATION PRESENT: ICD-10-CM

## 2023-09-14 NOTE — TELEPHONE ENCOUNTER
Reviewed lumbar MRI. There is increase in foraminal narrowing in the right L5-S1 level compared to prior imaging. There is now severe narrowing at this level. Will plan to repeat L5-S1 Transforaminal epidural steroid injection but if unable will proceed with caudal ELLEN. Order placed for a right L5-S1 Transforaminal epidural steroid injection.     Shilpa Turk MD  [facility]

## 2023-09-19 ENCOUNTER — TELEPHONE (OUTPATIENT)
Dept: PALLIATIVE MEDICINE | Facility: CLINIC | Age: 73
End: 2023-09-19
Payer: COMMERCIAL

## 2023-09-19 NOTE — TELEPHONE ENCOUNTER
"Screening Questions for Radiology Injections:    Injection to be done at which interventional clinic site? [facility]    If choosing [facility] for location, please inform patient:  \"[facility] is a Hospital based clinic. Before your visit, you should check with your insurance about how it covers the charges for facility services in a hospital-based clinic.     Procedure ordered by Rosalee    Procedure ordered? Right L5-S1 TFESI   Transforaminal Cervical ELLEN - Send to [facility] ([facility]) - No [facility] providers perform this procedure    What insurance would patient like us to bill for this procedure? ucare  IF SCHEDULING IN [place] [facility] PLEASE SCHEDULE AT LEAST 7-10 BUSINESS DAYS OUT SO A PA CAN BE OBTAINED  Worker's comp or MVA (motor vehicle accident) -Any injection DO NOT SCHEDULE and route to Marcos Araya.    [employer] insurance - For SI joint injections, DO NOT SCHEDULE and route to Marybel Pathak.     ALL BCBS, [employer] and [employer] - DO NOT SCHEDULE and route to Marybel Pathak  MEDICA- facet joint injections, route to Marybel Pathak    Is patient scheduled at [facility]? no   If YES, route every encounter to [facility] CARE NAVIGATION POOL [[phone]]    Is an  needed? No     Patient has a  home? (Review Grid) YES: ok    Any chance of pregnancy? NO   If YES, do NOT schedule and route to RN pool  - Dr. Mcleod route to Diana Garsia and PM&R Nurse  [[id number]]      Is patient actively being treated for cancer or immunocompromised? No  If YES, do NOT schedule and route to RN pool/ Dr. Mcleod's Team    Does the patient have a bleeding or clotting disorder? No   If YES, okay to schedule AND route to RN nurse / Dr. Mcleod's Team   (For any patients with platelet count <100, RN must forward to provider)    Is patient taking any Blood Thinners OR Antiplatelet medication?  No   If hold needed, do NOT schedule, route to DOMENICO barnes/ Dr. Mcleod's " Team  Examples:   Blood Thinners: (Coumadin, Warfarin, Jantoven, Pradaxa, Xarelto, Eliquis, Edoxaban, Enoxaparin, Lovenox, Heparin, Arixtra, Fondaparinux or Fragmin)  Antiplatelet Medications: (Plavix, Brilinta or Effient)     Is patient taking any aspirin products (includes Excedrin and Fiorinal)? No   If more than 325mg/day, OK to schedule; Instruct Pt to decrease to less than 325 mg for 7 days AND route to RN pool/ Dr. Mcleod's Team   For CERVICAL procedures, hold all aspirin products for 6 days.   Tell Pt that if aspirin product is not held for 6 days, the procedure WILL BE cancelled.     Any allergies to contrast dye, iodine, shellfish, or numbing and steroid medications? No  If YES, schedule and add allergy information to appointment notes AND route to the RN pool/ Dr. Mcleod's Team  If ELLEN and Contrast Dye / Iodine Allergy? DO NOT SCHEDULE, route to RN pool/ Dr. Mcleod's Team  Allergies: Patient has no known allergies.     Does patient have an active infection or treated for one within the past week? No  Is patient currently taking any antibiotics or steroid medications?  No   For patients on chronic, preventative, or prophylactic antibiotics, procedures may be scheduled.   For patients on antibiotics for active or recent infection, schedule 4 days after completed.  For patients on steroid medications, schedule 4 days after completed.     Has the patient had a flu shot or any other vaccinations within the past 7 days? No  If yes, explain that for the vaccine to work best they need to:     wait 1 week before and 1 week after getting any Vaccine  wait 1 week before and 2 weeks after getting Covid Vaccine #2 or BOOSTER  If patient has concerns about the timing, send to RN pool/ Dr. Mcleod's Team    Does patient have an MRI/CT?  YES: MRI Include Date and Check Procedure Scheduling Grid to see if required.  Was the MRI/CT done within the last 3 years?  Yes   If no route to RN Pool/ Dr. Mcleod's Team  If yes, where  was the MRI/CT done? [facility]   Refer to PACS Transmissions list for approved external locations and route to RN Pool High Priority/ Dr. Mcleod's Team  If MRI was not done at approved external location do NOT schedule and route to RN pool/ Dr. Mcleod's Team    If patient has an imaging disc, the injection MAY be scheduled but patient must bring disc to appt or appt will be cancelled.    Is patient able to transfer to a procedure table with minimal or no assistance? Yes   If no, do NOT schedule and route to  Pool/ Dr. Mcleod's Team    Procedure Specific Instructions:  If celiac plexus block, informed patient NPO for 6 hours and that it is okay to take medications with sips of water, especially blood pressure medications Not Applicable       If this is for a cervical procedure, informed patient that aspirin needs to be held for 6 days.   Not Applicable    Sedation, If Sedation is ordered for any procedure, patient must be NPO for 6 hours prior to procedure Not Applicable    If IV needed:  Do not schedule procedures requiring IV placement in the first appointment of the day or first appointment after lunch. Do NOT schedule at 0745, 0815 or 1245. ok  Instructed patient to arrive 30 minutes early for IV start if required. (Check Procedure Scheduling Grid)  Not Applicable    Reminders:  If you are started on any steroids or antibiotics between now and your appointment, you must contact us because the procedure may need to be cancelled.  Yes    As a reminder, receiving steroids can decrease your body's ability to fight infection.   Would you still like to move forward with scheduling the injection?  Yes    IV Sedation is not provided for procedures. If oral anti-anxiety medication is needed, the patient should request this from their referring provider.    Instruct patient to arrive as directed prior to the scheduled appointment time:  If IV needed 30 minutes before appointment time     For patients 85 or older we  recommend having an adult stay w/ them for the remainder of the day.     If the patient is Diabetic, remind them to bring their glucometer.      Does the patient have any questions?  NO  Marie Araya  [facility]

## 2023-10-10 ENCOUNTER — DOCUMENTATION ONLY (OUTPATIENT)
Dept: PEDIATRICS | Facility: CLINIC | Age: 73
End: 2023-10-10
Payer: COMMERCIAL

## 2023-10-10 DIAGNOSIS — E78.5 HYPERLIPIDEMIA WITH TARGET LDL LESS THAN 130: Primary | ICD-10-CM

## 2023-10-10 NOTE — PROGRESS NOTES
Marck Hernandez has an upcoming lab appointment:    Future Appointments   Date Time Provider Department Center   10/17/2023  7:45 AM [facility]   10/19/2023  3:45 PM Violet Neil MD [facility]     Patient is scheduled for the following lab(s):   Scheduling Notes:     Made On: 8/16/2023 4:08 PM By: DIMA MERRILL       There is no order available. Please review and place either future orders or HMPO (Review of Health Maintenance Protocol Orders), as appropriate.    Health Maintenance Due   Topic    ANNUAL REVIEW OF HM ORDERS      Yina Guzmán

## 2023-10-17 NOTE — PROGRESS NOTES
[facility] - Procedure Note    Date of Service: 10/19/2023    Procedure performed: RIGHT L5 transforaminal epidural steroid injection with fluoroscopic guidance  Diagnosis: Lumbar spondylosis; Lumbar radiculitis/radiculopathy  : Violet Neil MD  Anesthesia: none    Indications: Marck Hernandez is a 73 year old female who is seen at the request of Dr. Turk for lumbar transforaminal epidural steroid injection. The patient describes low back pain with radiation into the right posterior thigh. The patient has been exhibiting symptoms consistent with lumbar intraspinal inflammation and radiculopathy. Symptoms have been persistent, disabling, and intermittently severe. The patient reports minimal improvement with conservative treatment, including previous injections and medications.    This is a repeat injection.  Previous RIGHT L5 transforaminal epidural steroid injection done by DR. Turk on 7/22/2019 provided good pain relief for a period of 12 months.       LUMBAR MRI was done on 9/7/2023 which showed:   FINDINGS:   Nomenclature is based on 5 lumbar type vertebral bodies. Retrolisthesis of L5 on S1 measuring 2 mm. Anterior marginal osteophytes at L1/L2 - L5/S1. The vertebral bodies of the lumbar spine otherwise have normal stature, alignment, and marrow signal   intensity. Normal distal spinal cord and cauda equina with conus medullaris at the mid L2 level. The partially imaged intra-abdominal contents are unremarkable. Unremarkable visualized bony pelvis.     T12-L1: Mild loss of disc signal and disc height. No posterior disc bulge or spinal canal narrowing. No neural foraminal narrowing.      L1-L2: Mild loss of disc signal and disc height. No posterior disc bulge or spinal canal narrowing. No neural foraminal narrowing.     L2-L3: Mild loss of disc signal and disc height. Symmetric disc bulge and mild facet arthropathy without significant spinal canal narrowing. Mild  bilateral neural foraminal narrowing.      L3-L4: Mild loss of disc signal and disc height. Symmetric disc bulge and moderate facet arthropathy with mild spinal canal narrowing. Moderate bilateral neural foraminal narrowing.     L4-L5: Mild loss of disc signal and disc height. Symmetric disc bulge, advanced facet arthropathy and ligamentum flavum thickening with severe spinal canal narrowing. Severe bilateral neural foraminal narrowing.     L5-S1: Mild loss of disc signal and disc height. Symmetric disc bulge, facet arthropathy and ligamentum flavum thickening contributes to severe spinal canal narrowing. Severe bilateral neural foraminal narrowing.                                                                    IMPRESSION:  1.  At the L3/L4 level, there is a symmetric disc bulge, facet arthropathy and ligamentum flavum thickening contributing to mild spinal canal narrowing.  2.  At theL4/L5 and L5/S1 levels, there are symmetric disc bulges, facet arthropathy and ligamentum flavum thickening contributing to severe spinal canal narrowing.  3.  Multilevel posterolateral disc disease and facet arthropathy with multilevel neural foraminal narrowing, most pronounced at the L4/L5 and L5/S1 levels where there is severe bilateral neural foraminal narrowing.    Allergies:    No Known Allergies     Vitals:  BP (!) 191/89   Pulse 72   LMP 08/31/2022 (Exact Date)   SpO2 98%     Review of Systems: The patient denies recent fever, chills, illness, use of antibiotics or anticoagulants. All other 10-point review of systems negative.     Procedure: The procedure and risks were explained, and informed written consent was obtained from the patient. Risks include but are not limited to: infection, bleeding, increased pain, and damage to soft tissue, nerve, muscle, and vasculature structures. After getting informed consent, patient was brought into the procedure suite and was placed in a prone position on the procedure table. A  Pause for the Cause was performed. Patient was prepped and draped in sterile fashion.     After identifying the right L5 neuroforamen, the C-arm was rotated to a right lateral oblique angle.  A total of 4.5 mL of Lidocaine 1% was used to anesthetize the skin and the needle track at a skin entry site coaxial with the fluoroscopy beam, and overriding the superior aspect of the neuroforamen.  A 22 gauge 5 inch spinal needle was advanced under intermittent fluoroscopy until it entered the foramen superiorly.    The position was then inspected from anteroposterior and lateral views, and the needle adjusted appropriately.  After negative aspiration, a total of 1 mL of Omnipaque-300 was injected using static and continuous fluoroscopy confirming appropriate position, with spread along the nerve root sheath and into the epidural space, with no intravascular or intrathecal uptake. 0 mL of Omnipaque-300 was wasted.    1mL of 1% lidocaine with 20 mg of dexamethasone was injected.  The needle was removed. Hemostasis was achieved, the area was cleaned, and bandaids were placed when appropriate. Images were saved to PACS.    The patient tolerated the procedure well, and was taken to the recovery room, and there was no evidence of procedural complications. No new sensory or motor deficits were noted following the procedure. The patient was stable and able to ambulate on discharge home. Post-procedure instructions were provided.     Pre-procedure pain score: 7/10 in the back, 7/10 in the leg  Post-procedure pain score: 10/10 in the back, 10/10 in the leg    Assessment/Plan: Marck Hernandez is a 73 year old female s/p RIGHT L5 transforaminal epidural steroid injection today for lumbar spondylosis, radiculitis/radiculopathy.     1. Following today's procedure, the patient was advised to contact the Pain Management Center for any of the following:   Fever, chills, or night sweats   New onset of pain, numbness, or weakness   Any  questions/concerns regarding the procedure  If unable to contact the Pain Center, the patient was instructed to go to a local Emergency Room for any complications.   2. The patient should follow-up with the referring provider in 2 weeks for post-procedure evaluation.      ERNESTINE FRANCISCO MD   Pain Management

## 2023-10-19 ENCOUNTER — RADIOLOGY INJECTION OFFICE VISIT (OUTPATIENT)
Dept: PALLIATIVE MEDICINE | Facility: CLINIC | Age: 73
End: 2023-10-19
Attending: PHYSICAL MEDICINE & REHABILITATION
Payer: COMMERCIAL

## 2023-10-19 VITALS — HEART RATE: 67 BPM | SYSTOLIC BLOOD PRESSURE: 188 MMHG | DIASTOLIC BLOOD PRESSURE: 77 MMHG | OXYGEN SATURATION: 97 %

## 2023-10-19 DIAGNOSIS — M54.16 LUMBAR RADICULOPATHY: Primary | ICD-10-CM

## 2023-10-19 PROCEDURE — 64483 NJX AA&/STRD TFRM EPI L/S 1: CPT | Mod: RT | Performed by: ANESTHESIOLOGY

## 2023-10-19 RX ORDER — DEXAMETHASONE SODIUM PHOSPHATE 10 MG/ML
10 INJECTION, SOLUTION INTRAMUSCULAR; INTRAVENOUS ONCE
Status: COMPLETED | OUTPATIENT
Start: 2023-10-19 | End: 2023-10-19

## 2023-10-19 RX ADMIN — DEXAMETHASONE SODIUM PHOSPHATE 10 MG: 10 INJECTION, SOLUTION INTRAMUSCULAR; INTRAVENOUS at 15:56

## 2023-10-19 ASSESSMENT — PAIN SCALES - GENERAL: PAINLEVEL: SEVERE PAIN (7)

## 2023-10-19 NOTE — PATIENT INSTRUCTIONS
[facility] Procedure Discharge Instructions    Today you saw:   Dr. Violet Neil      Your procedure:  Epidural steroid injection       Medications used:  Lidocaine (anesthetic)   Dexamethasone (steroid)   Omnipaque (contrast)            Be cautious when walking as numbness and/or weakness in the legs may occur up to 6-8 hours after the procedure due to effect of the local anesthetic  Do not drive for 6 hours. The effect of the local anesthetic could slow your reflexes.   Avoid strenuous activity for the first 24 hours. You may resume your regular activities after that.   You may shower, however avoid swimming, tub baths or hot tubs for 24 hours following your procedure  You may have a mild to moderate increase in pain for several days following the injection.    You may use ice packs for 10-15 minutes, 3 to 4 times a day at the injection site for comfort  Do not use heat to painful areas for 6 to 8 hours. This will give the local anesthetic time to wear off and prevent you from accidentally burning your skin.  Unless you have been directed to avoid the use of anti-inflammatory medications (NSAIDS-ibuprofen, Aleve, Motrin), you may use these medications or Tylenol for pain control if needed.   With diabetes, check your blood sugar more frequently than usual as your blood sugar may be higher than normal for 10-14 days following a steroid injection. Contact your doctor who manages your diabetes if your blood sugar is higher than usual  Possible side effects of steroids that you may experience include flushing, elevated blood pressure, increased appetite, mild headaches and restlessness.  All of these symptoms will get better with time.  It may take up to 14 days for the steroid medication to start working although you may feel the effect as early as a few days after the procedure.   Follow up with your referring provider in 2-3 weeks- primary care    If you experience any of the following, call the  pain center line during work hours at [phone] or on-call physician after hours at [phone]:  -Fever over 100 degree F  -Swelling, bleeding, redness, drainage, warmth at the injection site  -Progressive weakness or numbness in your legs   -Loss of bowel or bladder function  -Unusual headache that is not relieved by Tylenol or your regular headache medication  -Unusual new onset of pain that is not improving

## 2023-10-19 NOTE — NURSING NOTE
Discharge Information    IV Discontiued Time:  NA    Amount of Fluid Infused:  NA    Discharge Criteria = When patient returns to baseline or as per MD order    Consciousness:  Pt is fully awake    Circulation:  BP +/- 20% of pre-procedure level    Respiration:  Patient is able to breathe deeply    O2 Sat:  Patient is able to maintain O2 Sat >92% on room air    Activity:  Moves 4 extremities on command    Ambulation:  Patient is able to stand and walk or stand and pivot into wheelchair    Dressing:  Clean/dry or No Dressing    Notes:   Discharge instructions and AVS given to patient    Patient meets criteria for discharge?  YES    Admitted to PCU?  No    Responsible adult present to accompany patient home?  Yes    Signature/Title:    Bronwyn Turcios RN  RN Care Coordinator  [facility] [place]

## 2023-10-19 NOTE — NURSING NOTE
Pre-procedure Intake  If YES to any questions or NO to having a   Please complete laminated checklist and leave on the computer keyboard for Provider, verbally inform provider if able.    For SCS Trial, RFA's or any sedation procedure:  Have you been fasting? NA  If yes, for how long?     Are you taking any any blood thinners such as Coumadin, Warfarin, Jantoven, Pradaxa Xarelto, Eliquis, Edoxaban, Enoxaparin, Lovenox, Heparin, Arixtra, Fondaparinux, or Fragmin? OR Antiplatelet medication such as Plavix, Brilinta, or Effient?   No   If yes, when did you take your last dose?     Do you take aspirin?  No  If cervical procedure, have you held aspirin for 6 days?   NA    Do you have any allergies to contrast dye, iodine, steroid and/or numbing medications?  NO    Are you currently taking antibiotics or have an active infection?  NO    Have you had a fever/elevated temperature within the past week? NO    Are you currently taking oral steroids? NO    Do you have a ? Yes    Are you pregnant or breastfeeding?  Not Applicable    Have you received the COVID-19 vaccine? Yes  If yes, was it your 1st, 2nd or only dose needed? 3  Date of most recent vaccine: 05/06/22    Vitals: B/P 191/89    Notify provider and RNs if systolic BP >170, diastolic BP >100, P >100 or O2 sats < 90%      Radha Main MA  [facility] [place]

## 2024-01-03 ENCOUNTER — OFFICE VISIT (OUTPATIENT)
Dept: FAMILY MEDICINE | Facility: CLINIC | Age: 74
End: 2024-01-03
Payer: COMMERCIAL

## 2024-01-03 VITALS
DIASTOLIC BLOOD PRESSURE: 80 MMHG | OXYGEN SATURATION: 99 % | HEART RATE: 71 BPM | SYSTOLIC BLOOD PRESSURE: 195 MMHG | TEMPERATURE: 97.4 F | WEIGHT: 167.2 LBS | HEIGHT: 63 IN | RESPIRATION RATE: 14 BRPM | BODY MASS INDEX: 29.62 KG/M2

## 2024-01-03 DIAGNOSIS — M25.562 ACUTE PAIN OF BOTH KNEES: ICD-10-CM

## 2024-01-03 DIAGNOSIS — M25.561 ACUTE PAIN OF BOTH KNEES: ICD-10-CM

## 2024-01-03 DIAGNOSIS — M25.512 ACUTE PAIN OF BOTH SHOULDERS: ICD-10-CM

## 2024-01-03 DIAGNOSIS — R55 NEAR SYNCOPE: Primary | ICD-10-CM

## 2024-01-03 DIAGNOSIS — I10 ESSENTIAL HYPERTENSION: ICD-10-CM

## 2024-01-03 DIAGNOSIS — M25.511 ACUTE PAIN OF BOTH SHOULDERS: ICD-10-CM

## 2024-01-03 PROCEDURE — 99215 OFFICE O/P EST HI 40 MIN: CPT | Performed by: PHYSICIAN ASSISTANT

## 2024-01-03 ASSESSMENT — ENCOUNTER SYMPTOMS: DIZZINESS: 1

## 2024-01-03 ASSESSMENT — PAIN SCALES - GENERAL: PAINLEVEL: WORST PAIN (10)

## 2024-01-03 NOTE — PROGRESS NOTES
Assessment & Plan   Problem List Items Addressed This Visit          Circulatory    Essential hypertension     Other Visit Diagnoses       Near syncope    -  Primary    Acute pain of both shoulders        Acute pain of both knees               Unclear cause of near syncope and other symptoms, but patient was symptomatic during exam. Normal neuro exam and lower suspicion for central cause, but cannot rule out. Unfortunately, our EKG machines were not operable today and I feel that she needs an expedited workup. ADS was consulted and felt she would best be treated in the ER. I agree. Son will drive her to [facility]. I spoke with RN at [facility] who was able to confirm that the patient had made it to the ER as she was in the waiting room.     Complete history and physical exam as below. Afebrile with normal vital signs aside from elevated bp.    DDx discussed with and explained to the pt to their satisfaction.  All questions were answered at this time. Pt expressed understanding of and agreement with this plan.  Will go to the Emergency Department and call 911 if symptoms worsen or new concerning symptoms arise en route for EMS transport. Patient left in no apparent distress with her son.     Review of the result(s) of each unique test - from previous ER visit in 3/2023  Assessment requiring an independent historian(s) - family - son  41 minutes spent by me on the date of the encounter doing chart review, history and exam, documentation and further activities per the note     See Patient Instructions    BENEDICT Brantley  [facility] BREE Acharya is a 73 year old, presenting for the following health issues:  Dizziness        1/3/2024     2:05 PM   Additional Questions   Roomed by Matilda Mercer CMA   Accompanied by Son, and grandchildren         1/3/2024     2:05 PM   Patient Reported Additional Medications   Patient reports taking the following new medications No new medications  "    Patient also mentions symptoms of pain in the shoulders and knees, and being sweaty at night. Left temporal headache. Gradual onset.     All symptoms started 2 weeks ago.  Feels faint at times and her vision begins to feel like it gets dark. Had her son  her here because she was feeling lightheaded. Had a similar episode in 3/2023, but that was of acute onset of symptoms. MRI/MRA of head and neck negative. Labs and EKG reassuring. Denies recent n/v/d, blood/black stools, sob, chest pain, numbness/tingling, focal weakness.     Associated left sided toothaches.  Aching in bilateral knees and shoulders.     History of Present Illness       Hypertension: She presents for follow up of hypertension.  She does not check blood pressure  regularly outside of the clinic. Outside blood pressures have been over 140/90. She does not follow a low salt diet.     She eats 2-3 servings of fruits and vegetables daily.She consumes 0 sweetened beverage(s) daily.She exercises with enough effort to increase her heart rate 20 to 29 minutes per day.  She exercises with enough effort to increase her heart rate 3 or less days per week.   She is taking medications regularly.      Review of Systems   Neurological:  Positive for dizziness.      Constitutional, HEENT, cardiovascular, pulmonary, gi and gu systems are negative, except as otherwise noted.      Objective    BP (!) 195/80   Pulse 71   Temp 97.4  F (36.3  C) (Temporal)   Resp 14   Ht 1.591 m (5' 2.64\")   Wt 75.8 kg (167 lb 3.2 oz)   LMP 08/31/2022 (Exact Date)   SpO2 99%   BMI 29.96 kg/m    Body mass index is 29.96 kg/m .  Physical Exam  Vitals and nursing note reviewed.   Constitutional:       General: She is not in acute distress.     Appearance: She is not ill-appearing or diaphoretic.   HENT:      Head: Normocephalic and atraumatic.      Right Ear: Tympanic membrane, ear canal and external ear normal.      Left Ear: Tympanic membrane, ear canal and external ear " normal.      Nose: Nose normal.      Mouth/Throat:      Mouth: Mucous membranes are moist.      Pharynx: Oropharynx is clear.   Eyes:      Conjunctiva/sclera: Conjunctivae normal.   Cardiovascular:      Rate and Rhythm: Normal rate and regular rhythm.      Heart sounds: Normal heart sounds. No murmur heard.     No friction rub. No gallop.   Pulmonary:      Effort: Pulmonary effort is normal. No respiratory distress.      Breath sounds: Normal breath sounds. No stridor. No wheezing, rhonchi or rales.   Skin:     General: Skin is warm and dry.   Neurological:      General: No focal deficit present.      Mental Status: She is alert. Mental status is at baseline.      Comments: Face symmetric.  Speech clear. CN 2-12 intact. Normal strength and sensation in face and upper/lower extremities bilaterally. Felt near syncopal while ambulating in exam room.   Psychiatric:         Mood and Affect: Mood normal.         Behavior: Behavior normal.

## 2024-03-04 ENCOUNTER — NURSE TRIAGE (OUTPATIENT)
Dept: PEDIATRICS | Facility: CLINIC | Age: 74
End: 2024-03-04
Payer: COMMERCIAL

## 2024-03-04 NOTE — TELEPHONE ENCOUNTER
"Nurse Triage SBAR    Is this a 2nd Level Triage? NO    Situation: Patient's son is calling (C2C on file) and stated patient is having symptoms of blurred vision/ can not see, has pain in shoulder, ear and back with headache.    Background: History of high blood pressure.  History of ED visits to ED for hypertension      Assessment: symptoms sound heart related, patient to be evaluated in person at ED at this time    Protocol Recommended Disposition:   Go to ED Now    Recommendation: RN protocol stated go to ED now from assessment with current symptoms.  Patient's son stated understanding.    FYI forward to provider    Does the patient meet one of the following criteria for ADS visit consideration? No    Reason for Disposition   Systolic BP >= 160 OR Diastolic >= 100, and any cardiac (e.g., breathing difficulty, chest pain) or neurologic symptoms (e.g., new-onset blurred or double vision)    Additional Information   Negative: Sounds like a life-threatening emergency to the triager   Negative: Symptom is main concern (e.g., headache, chest pain)   Negative: Low blood pressure is main concern    Answer Assessment - Initial Assessment Questions  1. BLOOD PRESSURE: \"What is the blood pressure?\" \"Did you take at least two measurements 5 minutes apart?\"      160/92, 155/79    2. ONSET: \"When did you take your blood pressure?\"      This morning/ 5 -10 minutes a park    3. HOW: \"How did you take your blood pressure?\" (e.g., automatic home BP monitor, visiting nurse)      Home BP    4. HISTORY: \"Do you have a history of high blood pressure?\"      Yes     5. MEDICINES: \"Are you taking any medicines for blood pressure?\" \"Have you missed any doses recently?\"      Yes    6. OTHER SYMPTOMS: \"Do you have any symptoms?\" (e.g., blurred vision, chest pain, difficulty breathing, headache, weakness)      Blurred vision, shoulder/ ear/ pain, headache    7. PREGNANCY: \"Is there any chance you are pregnant?\" \"When was your last menstrual " "period?\"      NA    Protocols used: Blood Pressure - High-A-OH  Roxy Gore RN    "

## 2024-03-14 ENCOUNTER — OFFICE VISIT (OUTPATIENT)
Dept: PEDIATRICS | Facility: CLINIC | Age: 74
End: 2024-03-14
Payer: COMMERCIAL

## 2024-03-14 VITALS
RESPIRATION RATE: 16 BRPM | HEART RATE: 69 BPM | BODY MASS INDEX: 30.46 KG/M2 | DIASTOLIC BLOOD PRESSURE: 75 MMHG | WEIGHT: 170 LBS | SYSTOLIC BLOOD PRESSURE: 160 MMHG | OXYGEN SATURATION: 99 % | TEMPERATURE: 97 F

## 2024-03-14 DIAGNOSIS — H53.9 VISION CHANGES: ICD-10-CM

## 2024-03-14 DIAGNOSIS — I10 ESSENTIAL HYPERTENSION: Primary | ICD-10-CM

## 2024-03-14 DIAGNOSIS — E78.5 HYPERLIPIDEMIA WITH TARGET LDL LESS THAN 130: ICD-10-CM

## 2024-03-14 PROCEDURE — 99214 OFFICE O/P EST MOD 30 MIN: CPT | Performed by: INTERNAL MEDICINE

## 2024-03-14 PROCEDURE — 80061 LIPID PANEL: CPT | Performed by: INTERNAL MEDICINE

## 2024-03-14 PROCEDURE — 36415 COLL VENOUS BLD VENIPUNCTURE: CPT | Performed by: INTERNAL MEDICINE

## 2024-03-14 RX ORDER — AMLODIPINE BESYLATE 2.5 MG/1
2.5 TABLET ORAL EVERY MORNING
Qty: 60 TABLET | Refills: 1 | Status: SHIPPED | OUTPATIENT
Start: 2024-03-14 | End: 2024-05-01

## 2024-03-14 ASSESSMENT — PAIN SCALES - GENERAL: PAINLEVEL: NO PAIN (0)

## 2024-03-14 NOTE — PROGRESS NOTES
"  Assessment & Plan     (I10) Essential hypertension  (primary encounter diagnosis)  Comment: Poorly controlled hypertension.  Continue irbesartan, add amlodipine.  Son plans to purchase an over-the-counter blood pressure monitor and will forward home blood pressure readings in the next week.  Plan: amLODIPine (NORVASC) 2.5 MG tablet          (E78.5) Hyperlipidemia with target LDL less than 130  Comment: Tolerating rosuvastatin, follow-up lipid panel today-well-controlled.  Lab Results   Component Value Date    LDL 75 03/14/2024     08/06/2018    Plan: Lipid panel reflex to direct LDL Fasting          (H53.9) Vision changes  Comment:   Plan: Recent MRI reviewed/no findings to account for recent symptoms-family will schedule a visit with ophthalmology to complete the workup.    At the end of visit, patient has questions regarding paresthesias in her hands and neck soreness.  Suspect cervical degenerative disc disease with radicular symptoms.  Discussed pain management and trial of physical therapy if symptoms persist.    MED REC REQUIRED  Post Medication Reconciliation Status:     BMI  Estimated body mass index is 30.46 kg/m  as calculated from the following:    Height as of 1/3/24: 1.591 m (5' 2.64\").    Weight as of this encounter: 77.1 kg (170 lb).             Vishal Acharya is a 73 year old, presenting for the following health issues:  Follow Up        3/14/2024    10:26 AM   Additional Questions   Roomed by Kathy Marquez   Accompanied by Ash - Son     HPI       ED/UC Followup:    Facility:  [facility]   Date of visit: 03/04/2024  Reason for visit: Blurred vision, high blood pressure   Current Status: Still has high blood pressure, son is concerned as it is not going down, not sure if kiran has been complying with medication changes made at hospital.     73-year-old with history of hypertension and hyperlipidemia presents today for follow-up of recent ER visits for evaluation of poorly " controlled high blood pressure and vision changes.  Patient has noted blurred vision over the past few months.  ED workup reviewed-MRI brain obtained showed no evidence of mass lesions or other acute findings-radiology noted some age-related volume loss, no evidence of stroke or bleed..  On presentation today still notes vision abnormality bilaterally.  Patient presents with her son who has referral information for ophthalmology.    Patient Active Problem List   Diagnosis    Hyperlipidemia with target LDL less than 130    Essential hypertension    Gastroesophageal reflux disease without esophagitis    Prediabetes     Current Outpatient Medications   Medication Sig Dispense Refill    amLODIPine (NORVASC) 2.5 MG tablet Take 1 tablet (2.5 mg) by mouth every morning 60 tablet 1    irbesartan (AVAPRO) 150 MG tablet Take 1 tablet (150 mg) by mouth At Bedtime 90 tablet 3    rosuvastatin (CRESTOR) 10 MG tablet Take 1 tablet (10 mg) by mouth daily 90 tablet 3              Objective    BP (!) 160/75 (BP Location: Right arm, Patient Position: Sitting, Cuff Size: Adult Large)   Pulse 69   Temp 97  F (36.1  C) (Tympanic)   Resp 16   Wt 77.1 kg (170 lb)   LMP 08/31/2022 (Exact Date)   SpO2 99%   BMI 30.46 kg/m    Body mass index is 30.46 kg/m .  Physical Exam               Signed Electronically by: Duke Zhu MD

## 2024-03-14 NOTE — PATIENT INSTRUCTIONS
Blood pressure.  Continue irbesartan.  Start amlodipine 2.5mg each morning  And [facility]      Please obtain some additional home readings and record over the next few weeks.  Goal blood pressure is less than 140/90.  If your home blood pressure cuff is out of date, the [employer]ON brand units are reasonable (available on [employer] or at [employer] or [employer]). We generally recommend purchasing an arm cuff automatic unit.        We recommend checking your pressure in the morning after you have been awake for 15 to 30 minutes.  Remember to sit quietly for about 10 minutes before taking the reading.   Take blood pressure reading daily and forward results in 1 week--we will continue to adjust your medications until blood pressures are well controlled

## 2024-03-15 LAB
CHOLEST SERPL-MCNC: 126 MG/DL
FASTING STATUS PATIENT QL REPORTED: YES
HDLC SERPL-MCNC: 36 MG/DL
LDLC SERPL CALC-MCNC: 75 MG/DL
NONHDLC SERPL-MCNC: 90 MG/DL
TRIGL SERPL-MCNC: 73 MG/DL

## 2024-05-01 ENCOUNTER — OFFICE VISIT (OUTPATIENT)
Dept: PEDIATRICS | Facility: CLINIC | Age: 74
End: 2024-05-01
Payer: COMMERCIAL

## 2024-05-01 ENCOUNTER — ANCILLARY PROCEDURE (OUTPATIENT)
Dept: GENERAL RADIOLOGY | Facility: CLINIC | Age: 74
End: 2024-05-01
Attending: INTERNAL MEDICINE
Payer: COMMERCIAL

## 2024-05-01 VITALS
OXYGEN SATURATION: 99 % | SYSTOLIC BLOOD PRESSURE: 168 MMHG | TEMPERATURE: 98 F | HEART RATE: 67 BPM | BODY MASS INDEX: 30.93 KG/M2 | DIASTOLIC BLOOD PRESSURE: 85 MMHG | RESPIRATION RATE: 18 BRPM | WEIGHT: 168.1 LBS | HEIGHT: 62 IN

## 2024-05-01 DIAGNOSIS — M25.511 RIGHT SHOULDER PAIN, UNSPECIFIED CHRONICITY: ICD-10-CM

## 2024-05-01 DIAGNOSIS — M19.011 ARTHRITIS OF RIGHT ACROMIOCLAVICULAR JOINT: ICD-10-CM

## 2024-05-01 DIAGNOSIS — I10 ESSENTIAL HYPERTENSION: Primary | ICD-10-CM

## 2024-05-01 DIAGNOSIS — E78.5 HYPERLIPIDEMIA WITH TARGET LDL LESS THAN 130: ICD-10-CM

## 2024-05-01 PROCEDURE — 99214 OFFICE O/P EST MOD 30 MIN: CPT | Performed by: INTERNAL MEDICINE

## 2024-05-01 PROCEDURE — 73030 X-RAY EXAM OF SHOULDER: CPT | Mod: TC | Performed by: RADIOLOGY

## 2024-05-01 RX ORDER — IRBESARTAN 150 MG/1
150 TABLET ORAL AT BEDTIME
Qty: 90 TABLET | Refills: 11 | Status: SHIPPED | OUTPATIENT
Start: 2024-05-01

## 2024-05-01 RX ORDER — AMLODIPINE BESYLATE 5 MG/1
5 TABLET ORAL DAILY
Qty: 90 TABLET | Refills: 11 | Status: SHIPPED | OUTPATIENT
Start: 2024-05-01

## 2024-05-01 RX ORDER — ROSUVASTATIN CALCIUM 10 MG/1
10 TABLET, COATED ORAL DAILY
Qty: 90 TABLET | Refills: 11 | Status: SHIPPED | OUTPATIENT
Start: 2024-05-01

## 2024-05-01 ASSESSMENT — PAIN SCALES - GENERAL: PAINLEVEL: WORST PAIN (10)

## 2024-05-01 NOTE — PROGRESS NOTES
Assessment & Plan     (I10) Essential hypertension  (primary encounter diagnosis)  Comment: poorly controlled.  Continue avapro 150.  Increase amlodipine from 2.5 to 5mg daily.  Son will check home BP readings and forward results in 2 wks  Plan: amLODIPine (NORVASC) 5 MG tablet, irbesartan         (AVAPRO) 150 MG tablet          (E78.5) Hyperlipidemia with target LDL less than 130  Comment: well controlled, tolerating statin  Plan: rosuvastatin (CRESTOR) 10 MG tablet          (M25.511) Right shoulder pain, unspecified chronicity  (M19.011) Arthritis of right acromioclavicular joint  Plan: XR Shoulder Right G/E 3 Views  Plan: Orthopedic  Referral              Plain films today: AC jt arthritis/ ?subacromial bursitis.  Recommended ice, acetaminophen prn, referral to sports medicine      Subjective   Marck is a 74 year old, presenting for the following health issues:  Follow Up (Blood pressure check. Pt states that the blood pressure medication that was given has not worked and her blood pressures are still high.)        5/1/2024     8:07 AM   Additional Questions   Roomed by ALEXEI Bartlett   Accompanied by Josiah Ash         5/1/2024     8:07 AM   Patient Reported Additional Medications   Patient reports taking the following new medications No     History of Present Illness       Hypertension: She presents for follow up of hypertension.  She does check blood pressure  regularly outside of the clinic. Outside blood pressures have been over 140/90. She does not follow a low salt diet.     Reason for visit:  Blood Pressures  Symptom onset:  More than a month  Symptom intensity:  Moderate  Symptom progression:  Worsening    She eats 2-3 servings of fruits and vegetables daily.She consumes 2 sweetened beverage(s) daily.She exercises with enough effort to increase her heart rate 9 or less minutes per day.  She exercises with enough effort to increase her heart rate 3 or less days per week.   She is taking  "medications regularly.     Right shoulder pain, worse with activity  Sx for past week or 2, has had prior problems with right shoulder  Denies injury    Hyperlipidemia  Recent Labs   Lab Test 03/14/24  1137 08/31/22  0903   CHOL 126 211*   HDL 36* 34*   LDL 75 149*   TRIG 73 139      Patient Active Problem List   Diagnosis    Hyperlipidemia with target LDL less than 130    Essential hypertension    Gastroesophageal reflux disease without esophagitis    Prediabetes     Current Outpatient Medications   Medication Sig Dispense Refill    amLODIPine (NORVASC) 5 MG tablet Take 1 tablet (5 mg) by mouth daily 90 tablet 11    irbesartan (AVAPRO) 150 MG tablet Take 1 tablet (150 mg) by mouth at bedtime 90 tablet 11    rosuvastatin (CRESTOR) 10 MG tablet Take 1 tablet (10 mg) by mouth daily 90 tablet 11            Objective    BP (!) 168/85 (BP Location: Left arm, Patient Position: Sitting, Cuff Size: Adult Large)   Pulse 67   Temp 98  F (36.7  C) (Oral)   Resp 18   Ht 1.568 m (5' 1.73\")   Wt 76.2 kg (168 lb 1.6 oz)   LMP 08/31/2022 (Exact Date)   SpO2 99%   BMI 31.01 kg/m    Body mass index is 31.01 kg/m .  Physical Exam   GENERAL: alert and no distress  RESP: lungs clear to auscultation - no rales, rhonchi or wheezes  CV: regular rate and rhythm, normal S1 S2, no S3 or S4, no murmur, click or rub, no peripheral edema  MS: right shoulder-tenderness over AC jt.  Pain worse with abduction, extension.  Unable to raise right arm above shoulder height due to pain  SKIN: no suspicious lesions or rashes  PSYCH: mentation appears normal, affect normal/bright            Signed Electronically by: Duke Zhu MD    "

## 2024-05-01 NOTE — PATIENT INSTRUCTIONS
High blood pressure:  Increase amlodipine to 5mg daily  Continue irbesartan 150mg daily      Send home blood pressure readings in 1-2 weeks by bean    Shoulder arthritis.  Referral to sports and orthopedics     [facility]  will call you to coordinate your care as prescribed by your provider. A representative will call you within 2 business days to help you schedule your appointment, or you may contact the  Representative at: [phone].     High cholesterol--much better.  Continue rosuvastatin

## 2024-05-04 ENCOUNTER — HEALTH MAINTENANCE LETTER (OUTPATIENT)
Age: 74
End: 2024-05-04

## 2024-05-06 NOTE — PROGRESS NOTES
ASSESSMENT & PLAN    Marck was seen today for pain.    Diagnoses and all orders for this visit:    Acute pain of right shoulder  -     Orthopedic  Referral  -     MR Shoulder Right w/o Contrast; Future      AC joint arthrosis on x-ray but does not appear to be pain generator. I do favor this coming from shoulder, with frozen shoulder vs rotator cuff pathology as considerations. Favor frozen shoulder. Somewhat difficult to assess today on exam, included with language/interpretation, and with rather diffuse symptoms. However, appears to be stemming more from shoulder, rather than neck.  Will obtain MRI next to assess further. From there, likely will have interest in injection, and would likely advise PT.  See below.  Questions answered. Discussed signs and symptoms that may indicate more serious issues; the patient was instructed to seek appropriate care if noted. Marck indicates understanding of these issues and agrees with the plan.      See Patient Instructions  Patient Instructions   We discussed the pain and limited motion in the right shoulder.  One consideration is rotator cuff tendon injury, given the limited motion and the pain, along with weakness.  Another consideration is frozen shoulder syndrome, also called adhesive capsulitis.  Plan MRI of the right shoulder next, to assess the rotator cuff.  From there, considerations may include physical therapy, also potential for a steroid injection.  Injection targets may include subacromial space, versus glenohumeral joint; await MRI results.  I perform subacromial injections when appropriate, but the other injection requires imaging guidance so that would require a referral.    Advanced imaging is done by appointment. Please call [facility] ([facility]/[place]) [phone] , [facility] ([facility]/[place]) [phone] , or [facility] ([facility]) [phone]  to  schedule your MRI.     Some insurance companies may require a prior authorization to be completed which can delay the time until you are able to schedule your appointment.       If you are active on [employer] you may have access to your test results before your provider is able to review the study and advise on next steps.      Please make a follow up appointment in the clinic no sooner than 2 days following your MRI by calling [phone].  Alternatively, if interested in phone or [employer] message with results and potential next steps, contact clinic after study has been completed.      If you have any further questions for your physician or physician s care team you can contact them thru [employer] or by calling [phone].      [facility]      CC: Duke Zhu      -----  Chief Complaint   Patient presents with    Right Shoulder - Pain       SUBJECTIVE  Marck Hernandez is a/an 74 year old female who is seen in consultation at the request of  Duke Zhu M.D. for evaluation of right shoulder.     The patient is seen with her son.  The patient is Right handed    Onset: 1+ Month(s) ago. Reports insidious onset without acute precipitating event.  Location of Pain: neck and right shoulder, down into the fingers  Worsened by: raising the arm overhead, reaching to the side, sleeping on the side  Better with:  Treatments tried: no treatment tried to date  Associated symptoms: no distal numbness or tingling; denies swelling or warmth    Orthopedic/Surgical history: NO  Social History/Occupation: Retired    **  Above information per rooming staff.  Additional history:  Visit conducted with  by iPad, and son interpreting also.    Pain in right shoulder, began more in shoulder, then noted more radiating symptoms to right neck and to right hand.    Also now with some symptoms to left forearm.        REVIEW OF SYSTEMS:  Review of  "Systems    OBJECTIVE:  Ht 1.549 m (5' 1\")   Wt 76.2 kg (168 lb)   LMP 08/31/2022 (Exact Date)   BMI 31.74 kg/m       Some portions of exam limited by pain, pt with difficulty with relaxation, language interpretation        Cervical Spine Exam    Range of Motion:       forward flexion with pain in neck         extension with pain bilateral paraspinals, radiates to right UE        lateral flexion with neck pain, radiates to shoulder area    Special Tests:      Spurling with neck area pain, appears to have some radiating symptoms to each UE ipsilaterally, though not clearly radicular in nature    Skin:     well perfused       capillary refill brisk    Lymphatics:      no edema noted in the upper extremities             Right Shoulder exam    ROM:      forward flexion ~45, shoulder hiking, pain        abduction 30-40, shoulder hiking, pain       internal rotation limited right compared to left       external rotation mild limitation right compared to left  Pain with rotation    Strength:      abduction 4/5       internal rotation 4+/5       external rotation 3/5  Pain with abduction, ER    Impingement testing:      pain with attempted Field       Pain with attempted empty can      RADIOLOGY:  Final results and radiologist's interpretation, available in the [facility] health record.  Images were reviewed with the patient in the office today.  My personal interpretation of the performed imaging: AC joint arthrosis. No acute bony abnormality noted.        XR SHOULDER RIGHT G/E 3 VIEWS 5/1/2024 9:09 AM      HISTORY: Right shoulder pain, unspecified chronicity     COMPARISON: None.                                                                          IMPRESSION: The right glenohumeral and acromioclavicular joints are  negative for fracture or dislocation. Hypertrophic change of the AC  joint.     MATA HERNANDEZ MD            Review of prior external note(s) from - referring  Review of the result(s) of each unique test - " imaging  Independent interpretation of a test performed by another physician/other qualified health care professional (not separately reported) - imaging  Ordering of each unique test

## 2024-05-07 ENCOUNTER — OFFICE VISIT (OUTPATIENT)
Dept: ORTHOPEDICS | Facility: CLINIC | Age: 74
End: 2024-05-07
Attending: INTERNAL MEDICINE
Payer: COMMERCIAL

## 2024-05-07 VITALS — WEIGHT: 168 LBS | HEIGHT: 61 IN | BODY MASS INDEX: 31.72 KG/M2

## 2024-05-07 DIAGNOSIS — M25.511 ACUTE PAIN OF RIGHT SHOULDER: ICD-10-CM

## 2024-05-07 PROCEDURE — 99244 OFF/OP CNSLTJ NEW/EST MOD 40: CPT | Performed by: PEDIATRICS

## 2024-05-07 NOTE — PATIENT INSTRUCTIONS
We discussed the pain and limited motion in the right shoulder.  One consideration is rotator cuff tendon injury, given the limited motion and the pain, along with weakness.  Another consideration is frozen shoulder syndrome, also called adhesive capsulitis.  Plan MRI of the right shoulder next, to assess the rotator cuff.  From there, considerations may include physical therapy, also potential for a steroid injection.  Injection targets may include subacromial space, versus glenohumeral joint; await MRI results.  I perform subacromial injections when appropriate, but the other injection requires imaging guidance so that would require a referral.    Advanced imaging is done by appointment. Please call [facility] ([facility]/[place]) [phone] , [facility] ([facility]/[place]) [phone] , or [facility] ([facility]) [phone]  to schedule your MRI.     Some insurance companies may require a prior authorization to be completed which can delay the time until you are able to schedule your appointment.       If you are active on [employer], you may have access to your test results before your provider is able to review the study and advise on next steps.      Please make a follow up appointment in the clinic no sooner than 2 days following your MRI by calling [phone].  Alternatively, if interested in phone or [employer] message with results and potential next steps, contact clinic after study has been completed.      If you have any further questions for your physician or physician s care team you can contact them thru [employer] or by calling [phone].

## 2024-05-07 NOTE — LETTER
5/7/2024         RE: Marck Hernandez  [address] [address]  [place] [address]        Dear Colleague,    Thank you for referring your patient, Marck Hernandez, to the [facility]. Please see a copy of my visit note below.    ASSESSMENT & PLAN    Marck was seen today for pain.    Diagnoses and all orders for this visit:    Acute pain of right shoulder  -     Orthopedic  Referral  -     MR Shoulder Right w/o Contrast; Future      AC joint arthrosis on x-ray but does not appear to be pain generator. I do favor this coming from shoulder, with frozen shoulder vs rotator cuff pathology as considerations. Favor frozen shoulder. Somewhat difficult to assess today on exam, included with language/interpretation, and with rather diffuse symptoms. However, appears to be stemming more from shoulder, rather than neck.  Will obtain MRI next to assess further. From there, likely will have interest in injection, and would likely advise PT.  See below.  Questions answered. Discussed signs and symptoms that may indicate more serious issues; the patient was instructed to seek appropriate care if noted. Marck indicates understanding of these issues and agrees with the plan.      See Patient Instructions  Patient Instructions   We discussed the pain and limited motion in the right shoulder.  One consideration is rotator cuff tendon injury, given the limited motion and the pain, along with weakness.  Another consideration is frozen shoulder syndrome, also called adhesive capsulitis.  Plan MRI of the right shoulder next, to assess the rotator cuff.  From there, considerations may include physical therapy, also potential for a steroid injection.  Injection targets may include subacromial space, versus glenohumeral joint; await MRI results.  I perform subacromial injections when appropriate, but the other injection requires imaging guidance so that would require a referral.    Advanced imaging  is done by appointment. Please call [facility] ([facility]/[place]) [phone] , [facility] ([facility]/[place]) [phone] , or [facility] ([facility]) [phone]  to schedule your MRI.     Some insurance companies may require a prior authorization to be completed which can delay the time until you are able to schedule your appointment.       If you are active on [employer], you may have access to your test results before your provider is able to review the study and advise on next steps.      Please make a follow up appointment in the clinic no sooner than 2 days following your MRI by calling [phone].  Alternatively, if interested in phone or [employer] message with results and potential next steps, contact clinic after study has been completed.      If you have any further questions for your physician or physician s care team you can contact them thru [employer] or by calling [phone].      Joe Casillas [facility]      CC: Duke Zhu      -----  Chief Complaint   Patient presents with     Right Shoulder - Pain       SUBJECTIVE  Marck Hernandez is a/an 74 year old female who is seen in consultation at the request of  Duke Zhu M.D. for evaluation of right shoulder.     The patient is seen with her son.  The patient is Right handed    Onset: 1+ Month(s) ago. Reports insidious onset without acute precipitating event.  Location of Pain: neck and right shoulder, down into the fingers  Worsened by: raising the arm overhead, reaching to the side, sleeping on the side  Better with:  Treatments tried: no treatment tried to date  Associated symptoms: no distal numbness or tingling; denies swelling or warmth    Orthopedic/Surgical history: NO  Social History/Occupation: Retired    **  Above information per rooming staff.  Additional history:  Visit conducted with language  " by iPad, and son interpreting also.    Pain in right shoulder, began more in shoulder, then noted more radiating symptoms to right neck and to right hand.    Also now with some symptoms to left forearm.        REVIEW OF SYSTEMS:  Review of Systems    OBJECTIVE:  Ht 1.549 m (5' 1\")   Wt 76.2 kg (168 lb)   LMP 08/31/2022 (Exact Date)   BMI 31.74 kg/m       Some portions of exam limited by pain, pt with difficulty with relaxation, language interpretation        Cervical Spine Exam    Range of Motion:       forward flexion with pain in neck         extension with pain bilateral paraspinals, radiates to right UE        lateral flexion with neck pain, radiates to shoulder area    Special Tests:      Spurling with neck area pain, appears to have some radiating symptoms to each UE ipsilaterally, though not clearly radicular in nature    Skin:     well perfused       capillary refill brisk    Lymphatics:      no edema noted in the upper extremities             Right Shoulder exam    ROM:      forward flexion ~45, shoulder hiking, pain        abduction 30-40, shoulder hiking, pain       internal rotation limited right compared to left       external rotation mild limitation right compared to left  Pain with rotation    Strength:      abduction 4/5       internal rotation 4+/5       external rotation 3/5  Pain with abduction, ER    Impingement testing:      pain with attempted Field       Pain with attempted empty can      RADIOLOGY:  Final results and radiologist's interpretation, available in the [facility] health record.  Images were reviewed with the patient in the office today.  My personal interpretation of the performed imaging: AC joint arthrosis. No acute bony abnormality noted.        XR SHOULDER RIGHT G/E 3 VIEWS 5/1/2024 9:09 AM      HISTORY: Right shoulder pain, unspecified chronicity     COMPARISON: None.                                                                          IMPRESSION: The right " glenohumeral and acromioclavicular joints are  negative for fracture or dislocation. Hypertrophic change of the AC  joint.     MATA HERNANDEZ MD            Review of prior external note(s) from - referring  Review of the result(s) of each unique test - imaging  Independent interpretation of a test performed by another physician/other qualified health care professional (not separately reported) - imaging  Ordering of each unique test             Again, thank you for allowing me to participate in the care of your patient.        Sincerely,        Joe Casillas, DO

## 2024-05-14 ENCOUNTER — HOSPITAL ENCOUNTER (OUTPATIENT)
Dept: MRI IMAGING | Facility: CLINIC | Age: 74
Discharge: HOME | End: 2024-05-14
Attending: PEDIATRICS | Admitting: PEDIATRICS
Payer: COMMERCIAL

## 2024-05-14 DIAGNOSIS — M25.511 ACUTE PAIN OF RIGHT SHOULDER: ICD-10-CM

## 2024-05-14 PROCEDURE — 73221 MRI JOINT UPR EXTREM W/O DYE: CPT | Mod: 26 | Performed by: RADIOLOGY

## 2024-05-14 PROCEDURE — 73221 MRI JOINT UPR EXTREM W/O DYE: CPT | Mod: RT

## 2024-05-20 ENCOUNTER — TELEPHONE (OUTPATIENT)
Dept: ORTHOPEDICS | Facility: CLINIC | Age: 74
End: 2024-05-20
Payer: COMMERCIAL

## 2024-05-20 NOTE — TELEPHONE ENCOUNTER
MR SHOULDER RIGHT W/O CONTRAST 5/14/2024 4:32 PM     TECHNIQUE: multi planar, multisequence imaging right shoulder without  contrast. There is marked patient motion on the images limiting the  evaluation of the right shoulder.     HISTORY:  evaluate rotator cuff; also with limited motion, question  adhesive capsulitis; Acute pain of right shoulder     COMPARISON: Plain x-rays 5/1/2024     FINDINGS: There is marked motion artifact compromising the imaging.     There is mild lateral downsloping of the bony acromium without  anterior downsloping.     There is pain moderate to marked amount of fluid in the subacromial  subdeltoid bursa especially anteriorly with a small shoulder effusion.     There is thought to be a high-grade tear of the supraspinatus tendon  anteriorly. There is mild atrophy of the supraspinatus muscle.     There is some fatty change and mild atrophy of the superior portion of  the subscapularis muscle seen on sagittal image 27 and thought to be a  high-grade tear of the superior fibers at their insertion on the  lesser tuberosity as seen on axial image 14.     There is tendinosis in the long head of biceps tendon.     The infraspinatus tendon is unremarkable.     No definite abnormality seen in the articular cartilage glenohumeral  joint no definite tear of the cartilaginous glenoid labrum though  motion artifact degrades images.     The acromioclavicular joint shows mild degenerative change.                                                                      IMPRESSION:   1. Marked motion artifact degrades images. There is thought to be a  high-grade tear of the supraspinatus tendon and the superior fibers of  subscapularis tendon. Repeat MR I with sedation or as an alternative a  ultrasound examination of the shoulder at an institution with  expertise in shoulder ultrasound may be helpful.  2. Moderately large amount of fluid in the subacromial subdeltoid  bursa and shoulder.  3. No evidence of  adhesive capsulitis.     GATO POPE MD

## 2024-05-22 NOTE — TELEPHONE ENCOUNTER
Appears to be rotator cuff tear, but limited assessment with pt motion on MRI, so the images are not entirely clear.  Some options:  -Repeat MRI but with sedation  -Ultrasound evaluation (order through radiology, to evaluate rotator cuff further)  -Trial of steroid injection (subacromial, in clinic with me)  -Trial of steroid injection (likely subacromial, but with imaging guidance with one of my colleagues; added benefit of that is potential for further evaluation with ultrasound)  -Referral to physical therapy    Would offer PT, please place referral.  Re: further imaging vs injection, depends on pt preference. I would favor setting up with Dr Weinberg for use of ultrasound to evaluate the area further, and do steroid injection (anticipated subacromial).    I would be happy to have a visit with the patient (in person, by video, or by phone) to discuss further if that would be helpful.  Thanks.  Joe Casillas, , CAQ

## 2024-06-05 NOTE — PROGRESS NOTES
Sports Medicine Procedure Note    Diagnoses and all orders for this visit:    Subacromial bursitis of right shoulder joint  -     Large Joint Injection/Arthocentesis: R subacromial bursa    Subacromial impingement of right shoulder  -     Large Joint Injection/Arthocentesis: R subacromial bursa        Marck Hernandez is a/an 74 year old female who is seen for Corticosteroid injection of right subacromial bursa.   Last injection: never    -CSI to the right subacromial bursa performed today under ultrasound guidance, see procedure note below for details  -Post-injection instructions were provided to the patient  -Physical therapy referral placed  -They will follow-up in the next 1 to 2 weeks for evaluation and treatment of the left shoulder        Large Joint Injection/Arthocentesis: R subacromial bursa    Date/Time: 6/7/2024 10:21 AM    Performed by: Ander Nickerson DO  Authorized by: Ander Nickerson DO    Indications:  Pain  Needle Size:  25 G  Guidance: ultrasound    Approach:  Posterior  Location:  Shoulder      Site:  R subacromial bursa  Medications:  6 mg betamethasone acet & sod phos 6 (3-3) MG/ML; 2 mL ROPivacaine 5 MG/ML; 2 mL lidocaine 1 %; 3 mL lidocaine 1 %  Outcome:  Tolerated well, no immediate complications  Procedure discussed: discussed risks, benefits, and alternatives    Consent Given by:  Patient  Timeout: timeout called immediately prior to procedure    Prep: patient was prepped and draped in usual sterile fashion     Ultrasound was used to ensure safe and accurate needle placement and injection. Ultrasound images of the procedure were permanently stored.  1ml of 8.4% Sodium Bicarbonate solution was used to buffer the local numbing agent for today's injection        Post-Injection Discharge Instructions    You may shower, however avoid swimming, tub baths or hot tubs for 24 hours following your procedure  You may have a mild to moderate increase in pain for a few days following the  injection.  The lidocaine (local numbing medicine) will wear off in several hours. It usually takes 3-5 days for the steroid medication to start working although it may take up to 14 days for full effect.   You may use ice packs for 10-15 minutes, 3 to 4 times a day at the injection site for comfort if needed  You may use extra strength Tylenol for pain control if necessary   If you were fasting, you may resume your normal diet and medications after the procedure  If you have diabetes, your blood sugar may be higher than normal for 10-14 days following a steroid injection. Contact your doctor who manages your diabetes if your blood sugar is significantly higher than usual    If you experience any of the following, call Sports Medicine @ [phone] or [phone]  -Fever over 100 degrees F  -Swelling, bleeding, redness, drainage, warmth at the injection site  -New or significant worsening pain        Dr. FREDIS Nickerson, DO CAQ  [facility]  Sports Medicine     -----

## 2024-06-07 ENCOUNTER — OFFICE VISIT (OUTPATIENT)
Dept: ORTHOPEDICS | Facility: CLINIC | Age: 74
End: 2024-06-07
Payer: COMMERCIAL

## 2024-06-07 DIAGNOSIS — M75.41 SUBACROMIAL IMPINGEMENT OF RIGHT SHOULDER: ICD-10-CM

## 2024-06-07 DIAGNOSIS — M75.51 SUBACROMIAL BURSITIS OF RIGHT SHOULDER JOINT: Primary | ICD-10-CM

## 2024-06-07 PROCEDURE — 20611 DRAIN/INJ JOINT/BURSA W/US: CPT | Mod: RT | Performed by: STUDENT IN AN ORGANIZED HEALTH CARE EDUCATION/TRAINING PROGRAM

## 2024-06-07 RX ORDER — LIDOCAINE HYDROCHLORIDE 10 MG/ML
3 INJECTION, SOLUTION INFILTRATION; PERINEURAL
Status: SHIPPED | OUTPATIENT
Start: 2024-06-07

## 2024-06-07 RX ORDER — ROPIVACAINE HYDROCHLORIDE 5 MG/ML
2 INJECTION, SOLUTION EPIDURAL; INFILTRATION; PERINEURAL
Status: SHIPPED | OUTPATIENT
Start: 2024-06-07

## 2024-06-07 RX ORDER — LIDOCAINE HYDROCHLORIDE 10 MG/ML
2 INJECTION, SOLUTION INFILTRATION; PERINEURAL
Status: SHIPPED | OUTPATIENT
Start: 2024-06-07

## 2024-06-07 RX ORDER — BETAMETHASONE SODIUM PHOSPHATE AND BETAMETHASONE ACETATE 3; 3 MG/ML; MG/ML
6 INJECTION, SUSPENSION INTRA-ARTICULAR; INTRALESIONAL; INTRAMUSCULAR; SOFT TISSUE
Status: SHIPPED | OUTPATIENT
Start: 2024-06-07

## 2024-06-07 RX ADMIN — ROPIVACAINE HYDROCHLORIDE 2 ML: 5 INJECTION, SOLUTION EPIDURAL; INFILTRATION; PERINEURAL at 10:21

## 2024-06-07 RX ADMIN — LIDOCAINE HYDROCHLORIDE 2 ML: 10 INJECTION, SOLUTION INFILTRATION; PERINEURAL at 10:21

## 2024-06-07 RX ADMIN — BETAMETHASONE SODIUM PHOSPHATE AND BETAMETHASONE ACETATE 6 MG: 3; 3 INJECTION, SUSPENSION INTRA-ARTICULAR; INTRALESIONAL; INTRAMUSCULAR; SOFT TISSUE at 10:21

## 2024-06-07 RX ADMIN — LIDOCAINE HYDROCHLORIDE 3 ML: 10 INJECTION, SOLUTION INFILTRATION; PERINEURAL at 10:21

## 2024-06-07 NOTE — LETTER
6/7/2024      Marck Hernandez  [address] [address] [address]      Dear Colleague,    Thank you for referring your patient, Marck Hernandez, to the [facility]. Please see a copy of my visit note below.    Sports Medicine Procedure Note    Diagnoses and all orders for this visit:    Subacromial bursitis of right shoulder joint  -     Large Joint Injection/Arthocentesis: R subacromial bursa    Subacromial impingement of right shoulder  -     Large Joint Injection/Arthocentesis: R subacromial bursa        Marck Hernandez is a/an 74 year old female who is seen for Corticosteroid injection of right subacromial bursa.   Last injection: never    -CSI to the right subacromial bursa performed today under ultrasound guidance, see procedure note below for details  -Post-injection instructions were provided to the patient  -Physical therapy referral placed  -They will follow-up in the next 1 to 2 weeks for evaluation and treatment of the left shoulder        Large Joint Injection/Arthocentesis: R subacromial bursa    Date/Time: 6/7/2024 10:21 AM    Performed by: Ander Nickerson DO  Authorized by: Ander Nickerson DO    Indications:  Pain  Needle Size:  25 G  Guidance: ultrasound    Approach:  Posterior  Location:  Shoulder      Site:  R subacromial bursa  Medications:  6 mg betamethasone acet & sod phos 6 (3-3) MG/ML; 2 mL ROPivacaine 5 MG/ML; 2 mL lidocaine 1 %; 3 mL lidocaine 1 %  Outcome:  Tolerated well, no immediate complications  Procedure discussed: discussed risks, benefits, and alternatives    Consent Given by:  Patient  Timeout: timeout called immediately prior to procedure    Prep: patient was prepped and draped in usual sterile fashion     Ultrasound was used to ensure safe and accurate needle placement and injection. Ultrasound images of the procedure were permanently stored.  1ml of 8.4% Sodium Bicarbonate solution was used to buffer the local numbing agent  for today's injection        Post-Injection Discharge Instructions    You may shower, however avoid swimming, tub baths or hot tubs for 24 hours following your procedure  You may have a mild to moderate increase in pain for a few days following the injection.  The lidocaine (local numbing medicine) will wear off in several hours. It usually takes 3-5 days for the steroid medication to start working although it may take up to 14 days for full effect.   You may use ice packs for 10-15 minutes, 3 to 4 times a day at the injection site for comfort if needed  You may use extra strength Tylenol for pain control if necessary   If you were fasting, you may resume your normal diet and medications after the procedure  If you have diabetes, your blood sugar may be higher than normal for 10-14 days following a steroid injection. Contact your doctor who manages your diabetes if your blood sugar is significantly higher than usual    If you experience any of the following, call Sports Medicine @ [phone] or [phone]  -Fever over 100 degrees F  -Swelling, bleeding, redness, drainage, warmth at the injection site  -New or significant worsening pain        Dr. FREDIS Nickerson DO CAQ  [facility]  Sports Medicine     -----      Again, thank you for allowing me to participate in the care of your patient.        Sincerely,        Ander Nickerson DO

## 2024-06-11 NOTE — PROGRESS NOTES
ASSESSMENT & PLAN             GUILLERMO Hernandez is a/an 74 year old female who is seen in follow-up for evaluation of left shoulder pain. The patient is seen with their son.    Onset: 2+ month(s) ago. Reports insidious onset without acute precipitating event.  Location of Pain: global shoulder radiating into the forearm and hand; no numbness/tingling  Rating of Pain at worst: 10/10  Rating of Pain Currently: 9/10  Worsened by: any movement of the shoulder, difficulty laying on the left side  Better with: none  Treatments tried: no treatment tried to date  Quality: sharp, stabbing  Associated symptoms: weakness of the shoulder/arm with movement, grinding in the shoulder  Orthopedic history: NO  Relevant surgical history: NO  Social history: unemployed    Past Medical History:   Diagnosis Date    GERD (gastroesophageal reflux disease)     HTN (hypertension)     Previously treated in [facility], not on medication since approx 2014     Social History     Socioeconomic History    Marital status:    Tobacco Use    Smoking status: Never     Passive exposure: Never    Smokeless tobacco: Never   Vaping Use    Vaping status: Never Used   Substance and Sexual Activity    Alcohol use: No    Drug use: No    Sexual activity: Never   Social History Narrative    Originally from [facility], moved here with son in 2015.  Lives with son.      Exercises at the gym. Active.     Adjust[place] well to the [place].      Social Determinants of Health     Financial Resource Strain: Low Risk  (1/3/2024)    Financial Resource Strain     Within the past 12 months, have you or your family members you live with been unable to get utilities (heat, electricity) when it was really needed?: No   Food Insecurity: Low Risk  (1/3/2024)    Food Insecurity     Within the past 12 months, did you worry that your food would run out before you got money to buy more?: No     Within the past 12 months, did the food you bought just not last and you didn t  have money to get more?: No   Transportation Needs: Low Risk  (1/3/2024)    Transportation Needs     Within the past 12 months, has lack of transportation kept you from medical appointments, getting your medicines, non-medical meetings or appointments, work, or from getting things that you need?: No   Physical Activity: Insufficiently Active (8/16/2023)    Exercise Vital Sign     Days of Exercise per Week: 1 day     Minutes of Exercise per Session: 10 min   Stress: No Stress Concern Present (8/16/2023)    [place] [employer] - Occupational Stress Questionnaire     Feeling of Stress : Not at all   Social Connections: Moderately Integrated (8/16/2023)    Social Connection and Isolation Panel [NHANES]     Frequency of Communication with Friends and Family: Twice a week     Frequency of Social Gatherings with Friends and Family: Once a week     Attends Sabian[employer] Services: More than 4 times per year     Active Member of Clubs or Organizations: No     Marital Status:    Housing Stability: Low Risk  (1/3/2024)    Housing Stability     Do you have housing? : Yes     Are you worried about losing your housing?: No         Patient's past medical, surgical, social, and family histories were personally reviewed today {SBDROSEXCEPTIONS:488910}    REVIEW OF SYSTEMS:  10 point ROS is negative other than symptoms noted above in HPI, Past Medical History or as stated below  Constitutional: NEGATIVE for fever, chills, change in weight  Skin: NEGATIVE for worrisome rashes, moles or lesions  GI/: NEGATIVE for bowel or bladder changes  Neuro: NEGATIVE for weakness, dizziness or paresthesias    OBJECTIVE:  Vital signs as noted in EPIC for 6/11/2024  General: healthy, alert and in no distress  HEENT: no scleral icterus or conjunctival erythema  Skin: no suspicious lesions or rash. No jaundice.  CV: no pedal edema  Resp: normal respiratory effort without conversational dyspnea   Psych: normal mood and  affect  Gait: normal steady gait with appropriate coordination and balance  Neuro: Normal light sensory exam of lower extremity      MSK:  ***            Personal Independent visualization of the below images done today:  ***    Patient's conditions were thoroughly discussed during today's visit with total time reviewing patient's previous medical records/history/radiology, face-to-face examination and discussion and plan of care with the patient and documentation being *** minutes for today's clinical visit  Terell Mohamud PA-C  [facility]    This note was completed in part using a voice recognition software, any grammatical or context distortion are unintentional and inherent to the software.

## 2024-06-14 ENCOUNTER — ANCILLARY PROCEDURE (OUTPATIENT)
Dept: GENERAL RADIOLOGY | Facility: CLINIC | Age: 74
End: 2024-06-14
Attending: STUDENT IN AN ORGANIZED HEALTH CARE EDUCATION/TRAINING PROGRAM
Payer: COMMERCIAL

## 2024-06-14 ENCOUNTER — OFFICE VISIT (OUTPATIENT)
Dept: ORTHOPEDICS | Facility: CLINIC | Age: 74
End: 2024-06-14
Payer: COMMERCIAL

## 2024-06-14 VITALS — WEIGHT: 168 LBS | HEIGHT: 61 IN | BODY MASS INDEX: 31.72 KG/M2

## 2024-06-14 DIAGNOSIS — M25.512 LEFT SHOULDER PAIN: ICD-10-CM

## 2024-06-14 DIAGNOSIS — M75.51 SUBACROMIAL BURSITIS OF RIGHT SHOULDER JOINT: ICD-10-CM

## 2024-06-14 DIAGNOSIS — M75.52 SUBACROMIAL BURSITIS OF LEFT SHOULDER JOINT: Primary | ICD-10-CM

## 2024-06-14 PROCEDURE — 73030 X-RAY EXAM OF SHOULDER: CPT | Mod: TC | Performed by: RADIOLOGY

## 2024-06-14 PROCEDURE — 20611 DRAIN/INJ JOINT/BURSA W/US: CPT | Mod: LT | Performed by: STUDENT IN AN ORGANIZED HEALTH CARE EDUCATION/TRAINING PROGRAM

## 2024-06-14 PROCEDURE — 99214 OFFICE O/P EST MOD 30 MIN: CPT | Mod: 25 | Performed by: STUDENT IN AN ORGANIZED HEALTH CARE EDUCATION/TRAINING PROGRAM

## 2024-06-14 RX ORDER — ROPIVACAINE HYDROCHLORIDE 5 MG/ML
2 INJECTION, SOLUTION EPIDURAL; INFILTRATION; PERINEURAL
Status: SHIPPED | OUTPATIENT
Start: 2024-06-14

## 2024-06-14 RX ORDER — LIDOCAINE HYDROCHLORIDE 10 MG/ML
5 INJECTION, SOLUTION INFILTRATION; PERINEURAL
Status: SHIPPED | OUTPATIENT
Start: 2024-06-14

## 2024-06-14 RX ORDER — BETAMETHASONE SODIUM PHOSPHATE AND BETAMETHASONE ACETATE 3; 3 MG/ML; MG/ML
6 INJECTION, SUSPENSION INTRA-ARTICULAR; INTRALESIONAL; INTRAMUSCULAR; SOFT TISSUE
Status: SHIPPED | OUTPATIENT
Start: 2024-06-14

## 2024-06-14 RX ADMIN — BETAMETHASONE SODIUM PHOSPHATE AND BETAMETHASONE ACETATE 6 MG: 3; 3 INJECTION, SUSPENSION INTRA-ARTICULAR; INTRALESIONAL; INTRAMUSCULAR; SOFT TISSUE at 10:13

## 2024-06-14 RX ADMIN — ROPIVACAINE HYDROCHLORIDE 2 ML: 5 INJECTION, SOLUTION EPIDURAL; INFILTRATION; PERINEURAL at 10:13

## 2024-06-14 RX ADMIN — LIDOCAINE HYDROCHLORIDE 5 ML: 10 INJECTION, SOLUTION INFILTRATION; PERINEURAL at 10:13

## 2024-06-14 NOTE — LETTER
6/14/2024      Marck Hernandez  [address] [facility] [address]      Dear Colleague,    Thank you for referring your patient, Marck Hernandez, to the [facility]. Please see a copy of my visit note below.    ASSESSMENT & PLAN    Marck was seen today for pain.    Diagnoses and all orders for this visit:    Subacromial bursitis of left shoulder joint  -     XR Shoulder Left G/E 3 Views; Future  -     Physical Therapy  Referral; Future  -     Large Joint Injection/Arthocentesis: L subacromial bursa    Subacromial bursitis of right shoulder joint  -     Physical Therapy  Referral; Future    Other orders  -     Cancel: Medium Joint Injection/Arthrocentesis      This issue is sub acute and Unchanged. Marck presents our clinic today to discuss her subacute left shoulder pain.  Radiographs taken in clinic today show some overall mild osteoarthritis of the glenohumeral joint but no acute fractures.  On exam, patient has full range of motion and 5/5 strength but pain with rotator cuff testing and impingement testing, making subacromial impingement the likely main  of her symptoms.  She also has some associated upper trapezius tightness and pain that I suspect is secondary to her impingement.  This is consistent with her similar symptoms on the right side, and since the steroid injection to her right shoulder last week she has had improvement in her pain on the right.  We discussed performing a corticosteroid injection for the left shoulder today, the patient was amenable to proceeding with this.  We also discussed the necessity of physical therapy for the long-term treatment of her pain and optimizing the mechanics of her shoulder, and they were amenable to proceeding with referral for PT today as well.  We determined the following plan:  - CSI to the left shoulder performed today under ultrasound guidance, see procedure note below for details  -  "Physical therapy referral placed for both shoulders  - They can otherwise use over-the-counter pain medicines, ice, and heat as needed  - They can follow-up in our clinic as needed      DO JUDITH [facility]    -----  Chief Complaint   Patient presents with     Left Shoulder - Pain       SUBJECTIVE  Marck Hernandez is a/an 74 year old female who is seen as a self referral for evaluation of left shoulder pain.     The patient is seen with their son.  The patient is Right handed    Onset: 2+ month(s) ago. Reports insidious onset without acute precipitating event.  Location of Pain:  upper trap, global shoulder radiating into the forearm and hand  Worsened by: any movement of the shoulder/arm, difficulty laying on left side,   Better with: none  Treatments tried: no treatment tried to date  Associated symptoms: weakness of the arm/shoulder with movement, feeling of instability, and grinding of the shoulder joint, hypomobility    Orthopedic/Surgical history: NO  Social History/Occupation: unemployed - no activities      REVIEW OF SYSTEMS:  Review of systems negative unless mentioned in HPI     OBJECTIVE:  Ht 1.549 m (5' 1\")   Wt 76.2 kg (168 lb)   LMP 08/31/2022 (Exact Date)   BMI 31.74 kg/m     General: healthy, alert and in no distress  Skin: no suspicious lesions or rash.  CV: distal perfusion intact   Resp: normal respiratory effort without conversational dyspnea   Psych: normal mood and affect  Gait: NORMAL  Neuro: Normal light sensory exam of gracie extremity     Shoulder Examination (focused):   Left  Right     Skin intact intact   Inspection No erythema, ecchymosis  No erythema, ecchymosis    Palpation Tenderness: None Tenderness: None   Range of Motion (active) Forward flexion:175   GH Abduction: 90  Reach behind back: T10 Forward flexion:175   GH Abduction: 90  Reach behind back: T10   Range of Motion (passive) Forward flexion:175   GH Abduction: 90  ER " at side: 70  ER at 90 deg abduction: 90  IR at 90 deg abduction: 60 Forward flexion:175   GH Abduction: 90  ER at side: 70  ER at 90 deg abduction: 90  IR at 90 deg abduction: 60   Crepitus No No   Strength Internal Rotation at side: 5+  External Rotation at side: 5+  Belly Press: 5+ Internal Rotation at side: 5+  External Rotation at side: 5+  Belly Press: 5+    Special Tests Field: painful   Neer: painful   Dimitry: 5+, painful   Speed's: 5+  Cross arm: Negative  Field: NP  Neer: NP  Dimitry: 5+  Speed's: 5+  Cross arm: Negative      Other Positive Tests/ Notable Findings O'sheridan's: NP  Bicep Load I/II: NP  Internal Impingement: NP  Yergason: NP   ERLS: NP  Hornblower: NP  Bear Hug: NP O'sheridan's: NP  Bicep Load I/II: NP  Internal Impingement: NP  Yergason: NP   ERLS: NP  Hornblower: NP  Bear Hug: NP   Instability Generalized laxity: no  Anterior apprehension: Negative  Load and shift (anterior): NP  Load and shift (posterior): NP Generalized laxity: no  Anterior apprehension: Negative  Load and shift (anterior): NP  Load and shift (posterior): NP   Neurologic No abnormal sensation.   Scapular Motion Normal scapular alignment bilaterally without notable protraction/retraction, elevation/depression  No dynamic evidence of scapular dyskinesia           RADIOLOGY:  Final results and radiologist's interpretation, available in the [facility] health record.  Images were reviewed with the patient in the office today.  My personal interpretation of the performed imaging: No acute fractures or other acute bony abnormalities.  Mild joint space narrowing of the glenohumeral joint.    Large Joint Injection/Arthocentesis: L subacromial bursa    Date/Time: 6/14/2024 10:13 AM    Performed by: Ander Nickerson DO  Authorized by: Ander Nickerson DO    Indications:  Pain  Needle Size:  25 G  Guidance: ultrasound    Approach:  Anterolateral  Location:  Shoulder      Site:  L subacromial bursa  Medications:  6 mg betamethasone acet & sod  phos 6 (3-3) MG/ML; 5 mL lidocaine 1 %; 2 mL ROPivacaine 5 MG/ML  Medications comment:  1ml of 8.4% Sodium Bicarbonate solution was used to buffer the local numbing agent for today's injection    Outcome:  Tolerated well, no immediate complications  Procedure discussed: discussed risks, benefits, and alternatives    Consent Given by:  Patient  Timeout: timeout called immediately prior to procedure    Prep: patient was prepped and draped in usual sterile fashion     Ultrasound was used to ensure safe and accurate needle placement and injection. Ultrasound images of the procedure were permanently stored.          Again, thank you for allowing me to participate in the care of your patient.        Sincerely,        Ander Nickerson, DO

## 2024-06-14 NOTE — PROGRESS NOTES
ASSESSMENT & PLAN    Marck was seen today for pain.    Diagnoses and all orders for this visit:    Subacromial bursitis of left shoulder joint  -     XR Shoulder Left G/E 3 Views; Future  -     Physical Therapy  Referral; Future  -     Large Joint Injection/Arthocentesis: L subacromial bursa    Subacromial bursitis of right shoulder joint  -     Physical Therapy  Referral; Future    Other orders  -     Cancel: Medium Joint Injection/Arthrocentesis      This issue is sub acute and Unchanged. Marck presents our clinic today to discuss her subacute left shoulder pain.  Radiographs taken in clinic today show some overall mild osteoarthritis of the glenohumeral joint but no acute fractures.  On exam, patient has full range of motion and 5/5 strength but pain with rotator cuff testing and impingement testing, making subacromial impingement the likely main  of her symptoms.  She also has some associated upper trapezius tightness and pain that I suspect is secondary to her impingement.  This is consistent with her similar symptoms on the right side, and since the steroid injection to her right shoulder last week she has had improvement in her pain on the right.  We discussed performing a corticosteroid injection for the left shoulder today, the patient was amenable to proceeding with this.  We also discussed the necessity of physical therapy for the long-term treatment of her pain and optimizing the mechanics of her shoulder, and they were amenable to proceeding with referral for PT today as well.  We determined the following plan:  - CSI to the left shoulder performed today under ultrasound guidance, see procedure note below for details  - Physical therapy referral placed for both shoulders  - They can otherwise use over-the-counter pain medicines, ice, and heat as needed  - They can follow-up in our clinic as needed      Ander Nickerson DO  [facility] SPOR[facility] MEDICINE [facility]  "[place]    -----  Chief Complaint   Patient presents with    Left Shoulder - Pain       SUBJECTIVE  Marck Hernandez is a/an 74 year old female who is seen as a self referral for evaluation of left shoulder pain.     The patient is seen with their son.  The patient is Right handed    Onset: 2+ month(s) ago. Reports insidious onset without acute precipitating event.  Location of Pain:  upper trap, global shoulder radiating into the forearm and hand  Worsened by: any movement of the shoulder/arm, difficulty laying on left side,   Better with: none  Treatments tried: no treatment tried to date  Associated symptoms: weakness of the arm/shoulder with movement, feeling of instability, and grinding of the shoulder joint, hypomobility    Orthopedic/Surgical history: NO  Social History/Occupation: unemployed - no activities      REVIEW OF SYSTEMS:  Review of systems negative unless mentioned in HPI     OBJECTIVE:  Ht 1.549 m (5' 1\")   Wt 76.2 kg (168 lb)   LMP 08/31/2022 (Exact Date)   BMI 31.74 kg/m     General: healthy, alert and in no distress  Skin: no suspicious lesions or rash.  CV: distal perfusion intact   Resp: normal respiratory effort without conversational dyspnea   Psych: normal mood and affect  Gait: NORMAL  Neuro: Normal light sensory exam of gracie extremity     Shoulder Examination (focused):   Left  Right     Skin intact intact   Inspection No erythema, ecchymosis  No erythema, ecchymosis    Palpation Tenderness: None Tenderness: None   Range of Motion (active) Forward flexion:175   GH Abduction: 90  Reach behind back: T10 Forward flexion:175   GH Abduction: 90  Reach behind back: T10   Range of Motion (passive) Forward flexion:175   GH Abduction: 90  ER at side: 70  ER at 90 deg abduction: 90  IR at 90 deg abduction: 60 Forward flexion:175   GH Abduction: 90  ER at side: 70  ER at 90 deg abduction: 90  IR at 90 deg abduction: 60   Crepitus No No   Strength Internal Rotation at side: 5+  External Rotation at " side: 5+  Belly Press: 5+ Internal Rotation at side: 5+  External Rotation at side: 5+  Belly Press: 5+    Special Tests Field: painful   Neer: painful   Dimitry: 5+, painful   Speed's: 5+  Cross arm: Negative  Field: NP  Neer: NP  Dimitry: 5+  Speed's: 5+  Cross arm: Negative      Other Positive Tests/ Notable Findings O'sheridan's: NP  Bicep Load I/II: NP  Internal Impingement: NP  Yergason: NP   ERLS: NP  Hornblower: NP  Bear Hug: NP O'sheridan's: NP  Bicep Load I/II: NP  Internal Impingement: NP  Yergason: NP   ERLS: NP  Hornblower: NP  Bear Hug: NP   Instability Generalized laxity: no  Anterior apprehension: Negative  Load and shift (anterior): NP  Load and shift (posterior): NP Generalized laxity: no  Anterior apprehension: Negative  Load and shift (anterior): NP  Load and shift (posterior): NP   Neurologic No abnormal sensation.   Scapular Motion Normal scapular alignment bilaterally without notable protraction/retraction, elevation/depression  No dynamic evidence of scapular dyskinesia           RADIOLOGY:  Final results and radiologist's interpretation, available in the [facility] health record.  Images were reviewed with the patient in the office today.  My personal interpretation of the performed imaging: No acute fractures or other acute bony abnormalities.  Mild joint space narrowing of the glenohumeral joint.    Large Joint Injection/Arthocentesis: L subacromial bursa    Date/Time: 6/14/2024 10:13 AM    Performed by: Ander Nickerson DO  Authorized by: Ander Nickerson DO    Indications:  Pain  Needle Size:  25 G  Guidance: ultrasound    Approach:  Anterolateral  Location:  Shoulder      Site:  L subacromial bursa  Medications:  6 mg betamethasone acet & sod phos 6 (3-3) MG/ML; 5 mL lidocaine 1 %; 2 mL ROPivacaine 5 MG/ML  Medications comment:  1ml of 8.4% Sodium Bicarbonate solution was used to buffer the local numbing agent for today's injection    Outcome:  Tolerated well, no immediate complications  Procedure  discussed: discussed risks, benefits, and alternatives    Consent Given by:  Patient  Timeout: timeout called immediately prior to procedure    Prep: patient was prepped and draped in usual sterile fashion     Ultrasound was used to ensure safe and accurate needle placement and injection. Ultrasound images of the procedure were permanently stored.

## 2024-09-21 ENCOUNTER — HEALTH MAINTENANCE LETTER (OUTPATIENT)
Age: 74
End: 2024-09-21

## 2025-02-11 NOTE — TELEPHONE ENCOUNTER
Son calling to notify the following:     - pt had an appointment with dentist today(ablout 2 hrs ago)  - during dentist visit her BP was over 200  - he doesn't know the SBP/DBP or pulse  - wants top bring her in to see someone now  - denies chest pain/tightness, MENDEZ, HA, SOB, vision changes, slurry speech, confusion, dizziness, weakness in one side of face/body, diaphoresis, lethargy, vomiting, nausea, pain radiating to jaw/shoulder/hand or dehydration sx's  - but he is keep saying that pt's BP was never that high  - not on any BP meds  - he doesn't know whether she has any hx of cardiac condition    Advised son to take her to ER for assessment. Provided [facility] address & direction. He agrees to take her to ER now.     BP Readings from Last 2 Encounters:   05/11/17 136/80     Reji, RN  Triage Nurse             VTE Assessment already completed for this visit

## 2025-05-03 DIAGNOSIS — I10 ESSENTIAL HYPERTENSION: ICD-10-CM

## 2025-05-05 RX ORDER — AMLODIPINE BESYLATE 5 MG/1
5 TABLET ORAL DAILY
Qty: 90 TABLET | Refills: 11 | Status: SHIPPED | OUTPATIENT
Start: 2025-05-05

## 2025-05-08 ENCOUNTER — ANCILLARY PROCEDURE (OUTPATIENT)
Dept: GENERAL RADIOLOGY | Facility: CLINIC | Age: 75
End: 2025-05-08
Attending: PHYSICIAN ASSISTANT
Payer: COMMERCIAL

## 2025-05-08 ENCOUNTER — OFFICE VISIT (OUTPATIENT)
Dept: FAMILY MEDICINE | Facility: CLINIC | Age: 75
End: 2025-05-08
Payer: COMMERCIAL

## 2025-05-08 VITALS
BODY MASS INDEX: 33.83 KG/M2 | HEART RATE: 71 BPM | HEIGHT: 61 IN | OXYGEN SATURATION: 97 % | DIASTOLIC BLOOD PRESSURE: 78 MMHG | WEIGHT: 179.2 LBS | TEMPERATURE: 97.7 F | RESPIRATION RATE: 20 BRPM | SYSTOLIC BLOOD PRESSURE: 128 MMHG

## 2025-05-08 DIAGNOSIS — M54.2 CERVICALGIA: ICD-10-CM

## 2025-05-08 DIAGNOSIS — M79.604 PAIN OF RIGHT LOWER EXTREMITY: ICD-10-CM

## 2025-05-08 DIAGNOSIS — R05.1 ACUTE COUGH: ICD-10-CM

## 2025-05-08 DIAGNOSIS — M54.2 CERVICALGIA: Primary | ICD-10-CM

## 2025-05-08 DIAGNOSIS — M94.0 COSTOCHONDRITIS: ICD-10-CM

## 2025-05-08 DIAGNOSIS — M79.89 ARM SWELLING: ICD-10-CM

## 2025-05-08 DIAGNOSIS — M25.50 POLYARTHRALGIA: ICD-10-CM

## 2025-05-08 DIAGNOSIS — Z78.0 POSTMENOPAUSAL STATUS: ICD-10-CM

## 2025-05-08 LAB
CK SERPL-CCNC: 70 U/L (ref 26–192)
CRP SERPL-MCNC: <3 MG/L
D DIMER PPP FEU-MCNC: <0.27 UG/ML FEU (ref 0–0.5)
ERYTHROCYTE [SEDIMENTATION RATE] IN BLOOD BY WESTERGREN METHOD: 26 MM/HR (ref 0–30)
RHEUMATOID FACT SERPL-ACNC: <10 IU/ML
TOTAL PROTEIN SERUM FOR ELP: 7.5 G/DL (ref 6.4–8.3)
TSH SERPL DL<=0.005 MIU/L-ACNC: 0.59 UIU/ML (ref 0.3–4.2)
URATE SERPL-MCNC: 4.5 MG/DL (ref 2.4–5.7)

## 2025-05-08 RX ORDER — GABAPENTIN 100 MG/1
100 CAPSULE ORAL 3 TIMES DAILY
Qty: 90 CAPSULE | Refills: 5 | Status: SHIPPED | OUTPATIENT
Start: 2025-05-08

## 2025-05-08 RX ORDER — PREDNISONE 10 MG/1
TABLET ORAL
Qty: 42 TABLET | Refills: 0 | Status: SHIPPED | OUTPATIENT
Start: 2025-05-08

## 2025-05-08 RX ORDER — CALCIUM CARBONATE 500(1250)
1 TABLET ORAL 2 TIMES DAILY
Qty: 90 TABLET | Refills: 3 | Status: SHIPPED | OUTPATIENT
Start: 2025-05-08

## 2025-05-08 NOTE — PROGRESS NOTES
"    Vishal Acharya is a 75 year old, presenting for the following health issues:  Pain (Recheck back and shoulder pain)        5/8/2025     7:46 AM   Additional Questions   Roomed by Roxy Mock CMA   Accompanied by None         5/8/2025     7:40 AM   Patient Reported Additional Medications   Patient reports taking the following new medications none     Musculoskeletal Problem    History of Present Illness       Reason for visit:  My sholder and my hip pain  Symptom onset:  3-4 weeks ago   She is taking medications regularly.      More generalized pain the past several mos.  Mild right forearm swelling.  Right shoulder , neck, right lower ext pain. Some left chest wall pain.    A cough has been noted the past 2 weeks. No wheezing. No fevers.    Occasional constipation.   Mild weight gain.        Review of Systems  Constitutional, HEENT, cardiovascular, pulmonary, GI, , musculoskeletal, neuro, skin, endocrine and psych systems are negative, except as otherwise noted.      Objective    /78   Pulse 71   Temp 97.7  F (36.5  C) (Oral)   Resp 20   Ht 1.549 m (5' 1\")   Wt 81.3 kg (179 lb 3.2 oz)   LMP 08/31/2022 (Exact Date)   SpO2 97%   BMI 33.86 kg/m    Body mass index is 33.86 kg/m .  Physical Exam   Eye exam - right eye normal lid, conjunctiva, cornea, pupil and fundus, left eye normal lid, conjunctiva, cornea, pupil and fundus.  Thyroid not palpable, not enlarged, no nodules detected.  CHEST:chest clear to IPPA, no tachypnea, retractions or cyanosis, and S1, S2 normal, no murmur, no gallop, rate regular.  Mild left anterior chest wall pain.  Minimal right wrist/distal forearm swelling and tenderness  Neck rom normal. Cervical trigger points noted.  Lumbosacral spine area reveals no local tenderness or mass.  Full and painless lumbosacral range of motion is noted. Straight leg raise is negative at 90 degrees on both sides. DTR's, motor strength and sensation normal, including heel and toe " gait.  Peripheral pulses are palpable.  No abdominal tenderness, mass or organomegaly.  Hip rom slightly limited with ext rotation bilaterally   No leg swelling.   Marck was seen today for pain.    Diagnoses and all orders for this visit:    Cervicalgia  -     XR Cervical Spine 2/3 Views; Future  -     predniSONE (DELTASONE) 10 MG tablet; Take 60 mg days 1 and 2, 50 mg days 3 and 4, 40 mg days 5 and 6, 30 mg days 7 and 8, 20 mg days 9 and 10, 10 mg days 11 and 12    Pain of right lower extremity  -     XR Lumbar Spine 2/3 Views; Future    Acute cough  -     XR Chest 2 Views; Future  -     predniSONE (DELTASONE) 10 MG tablet; Take 60 mg days 1 and 2, 50 mg days 3 and 4, 40 mg days 5 and 6, 30 mg days 7 and 8, 20 mg days 9 and 10, 10 mg days 11 and 12    Polyarthralgia  -     Rheumatoid factor; Future  -     ESR: Erythrocyte sedimentation rate; Future  -     CRP, inflammation; Future  -     Cancel: FILIPE Scrn Rflx to Titer and Ptrn ([facility])  -     Protein electrophoresis; Future  -     Protein Immunofixation Serum; Future  -     CK total; Future  -     Anti Nuclear Deepthi IgG by IFA with Reflex; Future  -     Rheumatoid factor  -     ESR: Erythrocyte sedimentation rate  -     CRP, inflammation  -     Protein electrophoresis  -     Protein Immunofixation Serum  -     CK total  -     Anti Nuclear Deepthi IgG by IFA with Reflex  -     predniSONE (DELTASONE) 10 MG tablet; Take 60 mg days 1 and 2, 50 mg days 3 and 4, 40 mg days 5 and 6, 30 mg days 7 and 8, 20 mg days 9 and 10, 10 mg days 11 and 12  -     gabapentin (NEURONTIN) 100 MG capsule; Take 1 capsule (100 mg) by mouth 3 times daily.  -     TSH with free T4 reflex; Future    Arm swelling  -     D dimer, quantitative; Future  -     D dimer, quantitative  -     Uric acid; Future    Postmenopausal status  -     DX Bone Density; Future  -     calcium carbonate (OS-TONYA) 500 MG tablet; Take 1 tablet (500 mg) by mouth 2 times daily.  -     vitamin D3 (CHOLECALCIFEROL) 250 mcg  (96502 units) capsule; Take 1 capsule (250 mcg) by mouth daily.    Costochondritis  -     predniSONE (DELTASONE) 10 MG tablet; Take 60 mg days 1 and 2, 50 mg days 3 and 4, 40 mg days 5 and 6, 30 mg days 7 and 8, 20 mg days 9 and 10, 10 mg days 11 and 12    Other orders  -     REVIEW OF HEALTH MAINTENANCE PROTOCOL ORDERS      Advised supportive and symptomatic treatment.  Follow up with Provider - if condition persists or worsens.   Hold crestor for the next several wks.     Signed Electronically by: Armando Castro PA-C

## 2025-05-21 ENCOUNTER — ANCILLARY PROCEDURE (OUTPATIENT)
Dept: BONE DENSITY | Facility: CLINIC | Age: 75
End: 2025-05-21
Attending: PHYSICIAN ASSISTANT
Payer: COMMERCIAL

## 2025-05-21 DIAGNOSIS — Z78.0 POSTMENOPAUSAL STATUS: ICD-10-CM

## 2025-05-21 PROCEDURE — 77080 DXA BONE DENSITY AXIAL: CPT | Mod: TC | Performed by: PHYSICIAN ASSISTANT

## 2025-05-23 ENCOUNTER — RESULTS FOLLOW-UP (OUTPATIENT)
Dept: FAMILY MEDICINE | Facility: CLINIC | Age: 75
End: 2025-05-23

## 2025-06-11 DIAGNOSIS — I10 ESSENTIAL HYPERTENSION: ICD-10-CM

## 2025-06-12 NOTE — TELEPHONE ENCOUNTER
RN attempted to contact patient at [phone] via [employer]  (ID [id number]). No answer, left message requesting call back to any triage nurse. Call back number was provided.    Upon call back:  - verify if patient is currently taking irbesartan 150mg  - received refill request (possibly auto generated from pharmacy?)  - per chart review, patient reports not taking at visit 5/8    Becka Neff RN

## 2025-06-13 NOTE — TELEPHONE ENCOUNTER
Pt's son calling back (CTC on file). Pt is still taking medication as previously prescribed (150mg daily).     Advised son that we haven't seen pt in over a year and that a follow up appointment is recommended.  Son stated understanding. Pt has since moved to [place] so they plan on establishing care there. They are looking for a temporary fill to get them through to when they can be seen. Nothing currently schedule at the time    Routing to pts current PCP to review order, edit, and sign as appropriate    Birgit Cruz RN on 6/13/2025 at 2:14 PM

## 2025-06-13 NOTE — TELEPHONE ENCOUNTER
Attempt x 2. Called pt using [employer]  and left a message to call back to [phone] and to ask to speak to a nurse.     Re-vinylhart sent (In English).      When pt calls back, please:   verify if patient is currently taking irbesartan 150mg  received refill request (possibly auto generated from pharmacy?)  per chart review, patient reports not taking at visit 5/8    Shira SWANN RN  [facility]

## 2025-06-14 RX ORDER — IRBESARTAN 150 MG/1
150 TABLET ORAL AT BEDTIME
Qty: 90 TABLET | Refills: 0 | Status: SHIPPED | OUTPATIENT
Start: 2025-06-14

## 2025-08-18 ENCOUNTER — TELEPHONE (OUTPATIENT)
Dept: FAMILY MEDICINE | Facility: CLINIC | Age: 75
End: 2025-08-18
Payer: COMMERCIAL

## 2025-08-18 ENCOUNTER — PATIENT OUTREACH (OUTPATIENT)
Dept: CARE COORDINATION | Facility: CLINIC | Age: 75
End: 2025-08-18
Payer: COMMERCIAL

## 2025-08-20 ENCOUNTER — PATIENT OUTREACH (OUTPATIENT)
Dept: CARE COORDINATION | Facility: CLINIC | Age: 75
End: 2025-08-20
Payer: COMMERCIAL

## 2025-08-26 ENCOUNTER — TELEPHONE (OUTPATIENT)
Dept: NEUROSURGERY | Facility: CLINIC | Age: 75
End: 2025-08-26
Payer: COMMERCIAL

## (undated) DEVICE — KIT ENDO TURNOVER/PROCEDURE W/CLEAN A SCOPE LINERS 103888

## (undated) RX ORDER — FENTANYL CITRATE 50 UG/ML
INJECTION, SOLUTION INTRAMUSCULAR; INTRAVENOUS
Status: DISPENSED
Start: 2017-06-08